# Patient Record
Sex: MALE | Race: BLACK OR AFRICAN AMERICAN | Employment: OTHER | URBAN - METROPOLITAN AREA
[De-identification: names, ages, dates, MRNs, and addresses within clinical notes are randomized per-mention and may not be internally consistent; named-entity substitution may affect disease eponyms.]

---

## 2017-03-12 ENCOUNTER — HOSPITAL ENCOUNTER (EMERGENCY)
Age: 72
Discharge: HOME | End: 2017-03-12
Attending: EMERGENCY MEDICINE
Payer: MEDICARE

## 2017-03-12 VITALS
HEART RATE: 88 BPM | BODY MASS INDEX: 27.12 KG/M2 | RESPIRATION RATE: 16 BRPM | DIASTOLIC BLOOD PRESSURE: 112 MMHG | WEIGHT: 183.64 LBS | TEMPERATURE: 97.6 F | OXYGEN SATURATION: 99 % | SYSTOLIC BLOOD PRESSURE: 184 MMHG

## 2017-03-12 DIAGNOSIS — H11.32 SUBCONJUNCTIVAL HEMORRHAGE OF LEFT EYE: Primary | ICD-10-CM

## 2017-03-12 DIAGNOSIS — S05.02XA CORNEAL ABRASION, LEFT, INITIAL ENCOUNTER: ICD-10-CM

## 2017-03-12 PROCEDURE — 99283 EMERGENCY DEPT VISIT LOW MDM: CPT

## 2017-03-12 RX ORDER — ERYTHROMYCIN 5 MG/G
OINTMENT OPHTHALMIC
Qty: 3.5 G | Refills: 0 | Status: SHIPPED | OUTPATIENT
Start: 2017-03-12 | End: 2017-03-19

## 2017-03-12 NOTE — DISCHARGE INSTRUCTIONS
Corneal Scratches: Care Instructions  Your Care Instructions    The cornea is the clear surface that covers the front of the eye. When a speck of dirt, a wood chip, an insect, or another object flies into your eye, it can cause a painful scratch on the cornea. Wearing contact lenses too long or rubbing your eyes can also scratch the cornea. Small scratches usually heal in a day or two. Deeper scratches may take longer. If you have had a foreign object removed from your eye or you have a corneal scratch, you will need to watch for infection and vision problems while your eye heals. Follow-up care is a key part of your treatment and safety. Be sure to make and go to all appointments, and call your doctor if you are having problems. Its also a good idea to know your test results and keep a list of the medicines you take. How can you care for yourself at home? · The doctor probably used a medicine during your exam to numb your eye. When it wears off in 30 to 60 minutes, your eye pain may come back. Take pain medicines exactly as directed. ¨ If the doctor gave you a prescription medicine for pain, take it as prescribed. ¨ If you are not taking a prescription pain medicine, ask your doctor if you can take an over-the-counter medicine. ¨ Do not take two or more pain medicines at the same time unless the doctor told you to. Many pain medicines have acetaminophen, which is Tylenol. Too much acetaminophen (Tylenol) can be harmful. · Do not rub your injured eye. Rubbing can make it worse. · Use the prescribed eyedrops or ointment as directed. Be sure the dropper or bottle tip is clean. To put in eyedrops or ointment:  ¨ Tilt your head back, and pull your lower eyelid down with one finger. ¨ Drop or squirt the medicine inside the lower lid. ¨ Close your eye for 30 to 60 seconds to let the drops or ointment move around. ¨ Do not touch the ointment or dropper tip to your eyelashes or any other surface.   · Do not use your contact lens in your hurt eye until your doctor says you can. Also, do not wear eye makeup until your eye has healed. · Do not drive if you have blurred vision. · Bright light may hurt. Sunglasses can help. · To prevent eye injuries in the future, wear safety glasses or goggles when you work with machines or tools, mow the lawn, or ride a bike or motorcycle. When should you call for help? Call your doctor now or seek immediate medical care if:  · You have any changes in your vision, flashes of light, or floaters (shadows or dark objects that float across your field of vision). · Pain or redness gets worse, or there is yellow, green, or bloody pus coming from the eye. · You have a fever. Watch closely for changes in your health, and be sure to contact your doctor if you have any problems. Where can you learn more? Go to http://krystle-tamia.info/. Enter A581 in the search box to learn more about \"Corneal Scratches: Care Instructions. \"  Current as of: May 23, 2016  Content Version: 11.1  © 4989-6132 [employer]. Care instructions adapted under license by [employer] (which disclaims liability or warranty for this information). If you have questions about a medical condition or this instruction, always ask your healthcare professional. [place] [address] any warranty or liability for your use of this information. Subconjunctival Hemorrhage: Care Instructions  Your Care Instructions    Sometimes small blood vessels in the white of the eye can break, causing a red spot or speck. This is called a subconjunctival hemorrhage. The blood vessels may break when you sneeze, cough, vomit, strain, or bend over. Sometimes there is no clear cause. The blood may look alarming, especially if the spot is large.  If there is no pain or vision change, there is usually no reason to worry, and the blood slowly will go away on its own in 2 to 3 weeks.  Follow-up care is a key part of your treatment and safety. Be sure to make and go to all appointments, and call your doctor if you are having problems. Its also a good idea to know your test results and keep a list of the medicines you take. How can you care for yourself at home? · Watch for changes in your eye. It is normal for the red spot on your eyeball to change color as it heals. Just like a bruise on your skin, it may change from red to brown to purple to yellow. · Do not take aspirin or products that contain aspirin, which can increase bleeding. Use acetaminophen (Tylenol) if you need pain relief for another problem. · Do not take two or more pain medicines at the same time unless the doctor told you to. Many pain medicines have acetaminophen, which is Tylenol. Too much acetaminophen (Tylenol) can be harmful. When should you call for help? Call your doctor now or seek immediate medical care if:  · You see blood over the black part of your eye (pupil). · You have any changes or problems in your vision. · You have any pain in your eye. · You have any discharge from your eye. Watch closely for changes in your health, and be sure to contact your doctor if:  · Your eye color is not steadily returning to normal.  · The blood has not gone away after 2 to 3 weeks. · You develop bruising or bleeding elsewhere, such as the gums or the skin, or you have nosebleeds. Where can you learn more? Go to http://krystle-tamia.info/. Enter D139 in the search box to learn more about \"Subconjunctival Hemorrhage: Care Instructions. \"  Current as of: May 23, 2016  Content Version: 11.1  © 9958-6888 [employer]. Care instructions adapted under license by [employer] (which disclaims liability or warranty for this information).  If you have questions about a medical condition or this instruction, always ask your healthcare professional. [place] [address] any warranty or liability for your use of this information.

## 2017-03-12 NOTE — ED PROVIDER NOTES
HPI Comments: Penelope Quach is a 80 y.o. male, pmhx significant for CVA (Dec 2016), and HTN, who presents ambulatory to the ED c/o intermittent left eye pain x 1 week and constant pain and redness since this morning. Pt states that he just woke up and noticed the pain and bright red coloring within his eye. Pt states that his last tetanus shot was 2 years ago. Pt denies any visual changes or any pain while in the ED. PCP: David Parisi MD    Social Hx: -tobacco (former), -EtOH, -Illicit Drugs    There are no other complaints, changes, or physical findings at this time. The history is provided by the patient. No  was used.         Past Medical History:   Diagnosis Date    Anxiety state, unspecified 8/27/2013    Arthritis     CAD (coronary artery disease)     Cataract cortical, senile 8/27/2013    Heart failure ([facility] [address]     MI 1993    Hypertension     Hypertensive retinopathy 8/27/2013    Observation for other specified suspected conditions 8/27/2013    Observation of [facility] Program Patient    Other ill-defined conditions(799.89)     chronic back pain    Other ill-defined conditions(799.89)     AGENT ORANGE EXPOSURE,     Psychiatric disorder     PTSD, NO MEDS    Stroke ([address]     TIA R HAND, RESOLVED    Unspecified adverse effect of anesthesia     SLOW TO WAKE, AGITATED WHEN HE WAKES    Unspecified reason for consultation 8/27/2013    Health maintenance       Past Surgical History:   Procedure Laterality Date    ABDOMEN SURGERY PROC UNLISTED      x2 hernia repairs    CARDIAC SURG PROCEDURE UNLIST      stent, cardiologist Dr. Evans Diop HX ORTHOPAEDIC Right     ROTATOR CUFF X2         Family History:   Problem Relation Age of Onset    Cancer Mother     Heart Attack Mother     Cancer Father     Anesth Problems Neg Hx        Social History     Social History    Marital status: SINGLE     Spouse name: N/A    Number of children: N/A    Years of education: N/A Occupational History    Not on file. Social History Main Topics    Smoking status: Former Smoker     Packs/day: 0.50     Years: 19.00    Smokeless tobacco: Former User     Quit date: 2/28/1984    Alcohol use No    Drug use: No    Sexual activity: Not on file     Other Topics Concern    Not on file     Social History Narrative         ALLERGIES: Darvocet a500 [propoxyphene n-acetaminophen]    Review of Systems   Constitutional: Negative. HENT: Negative. Eyes: Positive for pain and redness. Negative for visual disturbance. Respiratory: Negative. Cardiovascular: Negative. Gastrointestinal: Negative. Genitourinary: Negative. Musculoskeletal: Negative. Skin: Negative. Neurological: Negative. Psychiatric/Behavioral: Negative. All other systems reviewed and are negative. Vitals:    03/12/17 0948   BP: (!) 184/112   Pulse: 88   Resp: 16   Temp: 97.6 °F (36.4 °C)   SpO2: 99%   Weight: 83.3 kg (183 lb 10.3 oz)            Physical Exam   Constitutional: He is oriented to person, place, and time. He appears well-developed and well-nourished. No distress. HENT:   Head: Normocephalic and atraumatic. Right Ear: External ear normal.   Left Ear: External ear normal.   Nose: Nose normal.   Mouth/Throat: Oropharynx is clear and moist. No oropharyngeal exudate. Eyes: Conjunctivae and EOM are normal. Pupils are equal, round, and reactive to light. Right eye exhibits no discharge. Left eye exhibits no discharge. No scleral icterus. subcontragial hemorrhage in left eye   Neck: Normal range of motion. Neck supple. No JVD present. No tracheal deviation present. Cardiovascular: Normal rate, regular rhythm, normal heart sounds and intact distal pulses. Exam reveals no gallop and no friction rub. No murmur heard. Pulmonary/Chest: Effort normal and breath sounds normal. No respiratory distress. He has no wheezes. He has no rales. He exhibits no tenderness. Abdominal: Soft. Bowel sounds are normal. He exhibits no distension and no mass. There is no tenderness. There is no rebound and no guarding. Musculoskeletal: Normal range of motion. He exhibits no edema or tenderness. Lymphadenopathy:     He has no cervical adenopathy. Neurological: He is alert and oriented to person, place, and time. He has normal reflexes. No cranial nerve deficit. He exhibits normal muscle tone. Coordination normal.   Skin: Skin is warm and dry. He is not diaphoretic. Psychiatric: He has a normal mood and affect. His behavior is normal. Judgment and thought content normal.   Nursing note and vitals reviewed. MDM  Number of Diagnoses or Management Options  Corneal abrasion, left, initial encounter:   Subconjunctival hemorrhage of left eye:   Diagnosis management comments:   DDx: corneal abrasion, subcontragial hemorrhage, foreign body       Amount and/or Complexity of Data Reviewed  Review and summarize past medical records: yes    Patient Progress  Patient progress: stable    ED Course       Procedures    Procedure Note - Wood's lamp exam:  9:50 AM  Performed by: Francine Priest  Pts left eye was anesthetized with proparacaine, stained with fluorescein, and examined with a Wood's lamp, using lid eversion. Foreign body: no   Fluorescein uptake: yes, showing Corneal abrasion at 5 o'clock  The procedure took 1-15 minutes, and pt tolerated well. Written by Jenny Salmon, ED Scribe, as dictated by Francine Priest. IMPRESSION:  1. Subconjunctival hemorrhage of left eye    2. Corneal abrasion, left, initial encounter        PLAN:  1. Discharge home.   Discharge Medication List as of 3/12/2017  9:53 AM      START taking these medications    Details   erythromycin (ILOTYCIN) ophthalmic ointment 1 cm l eye 6 times daily, Normal, Disp-3.5 g, R-0         CONTINUE these medications which have NOT CHANGED    Details   clotrimazole-betamethasone (LOTRISONE) topical cream APPLY TO AREA TWICE A DAY, Print, Disp-135 g, R-3      fluticasone (FLONASE) 50 mcg/actuation nasal spray USE 2 SPRAYS IN BOTH NOSTRILS DAILY. , Print, Disp-3 Bottle, R-3      atorvastatin (LIPITOR) 80 mg tablet Take 1 Tab by mouth daily. , Normal, Disp-30 Tab, R-12      aspirin delayed-release 81 mg tablet Historical Med, R-2      COREG CR 40 mg CR capsule Historical Med, R-5      clopidogrel (PLAVIX) 75 mg tablet Historical Med, R-5      NITROSTAT 0.4 mg SL tablet Historical Med, R-3      potassium chloride (K-DUR, KLOR-CON) 20 mEq tablet Historical Med, R-5      RANEXA 500 mg SR tablet Historical Med, R-5      valsartan-hydrochlorothiazide (DIOVAN-HCT) 320-25 mg per tablet Historical Med, R-2      amLODIPine (NORVASC) 5 mg tablet Take 10 mg by mouth daily. , Historical Med      ezetimibe (ZETIA) 10 mg tablet Take  by mouth. Historical Med           2. Follow-up Information     Follow up With Details Comments Contact Info    Tristen Camara MD In 2 days For wound re-check [facility]  [phone]          3. Return to ED if worse       DISCHARGE NOTE:  9:53 AM  Patient's results have been reviewed with them. Patient and/or family have verbally conveyed their understanding and agreement of the patient's signs, symptoms, diagnosis, treatment and prognosis and additionally agree to follow up as recommended or return to the Emergency Room should their condition change prior to follow-up. Discharge instructions have also been provided to the patient with some educational information regarding their diagnosis as well a list of reasons why they would want to return to the ER prior to their follow-up appointment should their condition change. Written by Yaya Guerra, ED Scribe, as dictated by Katalina Fox. Attestation: This note is prepared by Yaya Guerra, acting as Scribe for Katalina Fox.     ELIZABETH Traore: The scribe's documentation has been prepared under my direction and personally reviewed by me in its entirety. I confirm that the note above accurately reflects all work, treatment, procedures, and medical decision making performed by me.

## 2017-03-20 ENCOUNTER — OFFICE VISIT (OUTPATIENT)
Dept: INTERNAL MEDICINE CLINIC | Age: 72
End: 2017-03-20

## 2017-03-20 VITALS
SYSTOLIC BLOOD PRESSURE: 130 MMHG | WEIGHT: 179 LBS | HEART RATE: 86 BPM | DIASTOLIC BLOOD PRESSURE: 80 MMHG | BODY MASS INDEX: 26.51 KG/M2 | HEIGHT: 69 IN | RESPIRATION RATE: 18 BRPM | TEMPERATURE: 98.2 F | OXYGEN SATURATION: 99 %

## 2017-03-20 DIAGNOSIS — I25.10 CORONARY ARTERY DISEASE INVOLVING NATIVE CORONARY ARTERY OF NATIVE HEART WITHOUT ANGINA PECTORIS: ICD-10-CM

## 2017-03-20 DIAGNOSIS — Z00.00 MEDICARE ANNUAL WELLNESS VISIT, SUBSEQUENT: ICD-10-CM

## 2017-03-20 DIAGNOSIS — E78.00 HYPERCHOLESTEROLEMIA: Primary | ICD-10-CM

## 2017-03-20 DIAGNOSIS — I10 HTN (HYPERTENSION), BENIGN: ICD-10-CM

## 2017-03-20 LAB
CHOLEST SERPL-MCNC: 194 MG/DL
HDLC SERPL-MCNC: 59 MG/DL
LDL CHOLESTEROL POC: 111
NON-HDL GOAL (POC): 135
TCHOL/HDL RATIO (POC): 3.3
TRIGL SERPL-MCNC: 122 MG/DL

## 2017-03-20 NOTE — PROGRESS NOTES
Joaquina Mosqueda is a 80 y.o. male and presents with Hospital Follow Up; Coronary Artery Disease; and Annual Wellness Visit  . Subjective:  He was seen recently for a stroke and admitted. He has done well thus far. He comes in today for follow up of an abrasion on the lt. Hypertension Review:  The patient has hypertension . Diet and Lifestyle: generally follows a low sodium diet, exercises sporadically  Home BP Monitoring: is not measured at home. Pertinent ROS: taking medications as instructed, no medication side effects noted, no TIA's, no chest pain on exertion, no dyspnea on exertion, no swelling of ankles. Dyslipidemia Review:  Patient presents for evaluation of lipids. Compliance with treatment thus far has been excellent. A repeat fasting lipid profile was done. The patient does not use medications that may worsen dyslipidemias . The patient exercises sporadically. The patient is not known to have coexisting coronary artery disease        Joaquina Mosqueda is a 80 y.o. male and presents for annual Medicare Wellness Visit. Problem List: Reviewed with patient and discussed risk factors.     Patient Active Problem List   Diagnosis Code    Hypercholesterolemia E78.00    CAD (coronary artery disease) I25.10    HTN (hypertension), benign I10    Anxiety state, unspecified F41.1    Cataract cortical, senile H25.019    Unspecified reason for consultation Z71.9    Hypertensive retinopathy H35.039    Observation for other specified suspected conditions Z03.89    Rotator cuff tear, left M75.102    Stroke (cerebrum) ([facility]) I63.9    Stenosis of both internal carotid arteries I65.23    Cerebral infarction involving right middle cerebral artery ([address] I63.311       Current medical providers:  Patient Care Team:  Lala Mckeon MD as PCP - General (Internal Medicine)  Marcela Romero RN as Nurse Navigator    PSH: Reviewed with patient  Past Surgical History:   Procedure Laterality Date    ABDOMEN SURGERY PROC UNLISTED      x2 hernia repairs    CARDIAC SURG PROCEDURE UNLIST      stent, cardiologist Dr. John Hood HX ORTHOPAEDIC Right     Claudia Andreia X2        SH: Reviewed with patient  Social History   Substance Use Topics    Smoking status: Former Smoker     Packs/day: 0.50     Years: 19.00    Smokeless tobacco: Former User     Quit date: 1984    Alcohol use No       FH: Reviewed with patient  Family History   Problem Relation Age of Onset    Cancer Mother     Heart Attack Mother     Cancer Father     Anesth Problems Neg Hx        Medications/Allergies: Reviewed with patient  Current Outpatient Prescriptions on File Prior to Visit   Medication Sig Dispense Refill    clotrimazole-betamethasone (LOTRISONE) topical cream APPLY TO AREA TWICE A  g 3    fluticasone (FLONASE) 50 mcg/actuation nasal spray USE 2 SPRAYS IN BOTH NOSTRILS DAILY. 3 Bottle 3    atorvastatin (LIPITOR) 80 mg tablet Take 1 Tab by mouth daily. 30 Tab 12    aspirin delayed-release 81 mg tablet   2    COREG CR 40 mg CR capsule   5    clopidogrel (PLAVIX) 75 mg tablet   5    NITROSTAT 0.4 mg SL tablet   3    potassium chloride (K-DUR, KLOR-CON) 20 mEq tablet   5    RANEXA 500 mg SR tablet   5    valsartan-hydrochlorothiazide (DIOVAN-HCT) 320-25 mg per tablet   2    amLODIPine (NORVASC) 5 mg tablet Take 10 mg by mouth daily.  [] erythromycin (ILOTYCIN) ophthalmic ointment 1 cm l eye 6 times daily 3.5 g 0    ezetimibe (ZETIA) 10 mg tablet Take  by mouth. No current facility-administered medications on file prior to visit. Allergies   Allergen Reactions    Darvocet A500 [Propoxyphene N-Acetaminophen] Other (comments)     floaty/high feeling       Objective:  Visit Vitals    /80    Pulse 86    Temp 98.2 °F (36.8 °C)    Resp 18    Ht 5' 9\" (1.753 m)    Wt 179 lb (81.2 kg)    SpO2 99%    BMI 26.43 kg/m2    Body mass index is 26.43 kg/(m^2).     Assessment of cognitive impairment: Alert and oriented x 3    Depression Screen:   PHQ 2 / 9, over the last two weeks 3/20/2017   Little interest or pleasure in doing things Not at all   Feeling down, depressed or hopeless Not at all   Total Score PHQ 2 0       Fall Risk Assessment:    Fall Risk Assessment, last 12 mths 3/20/2017   Able to walk? Yes   Fall in past 12 months? No       Functional Ability:   Does the patient exhibit a steady gait? yes   How long did it take the patient to get up and walk from a sitting position? seconds   Is the patient self reliant?  (ie can do own laundry, meals, household chores)  yes     Does the patient handle his/her own medications? yes     Does the patient handle his/her own money? yes     Is the patients home safe (ie good lighting, handrails on stairs and bath, etc.)? yes     Did you notice or did patient express any hearing difficulties? no     Did you notice or did patient express any vision difficulties?   no     Were distance and reading eye charts used? yes       Advance Care Planning:   Patient was offered the opportunity to discuss advance care planning:  yes     Does patient have an Advance Directive:  yes   If no, did you provide information on Caring Connections? yes       Plan:      Orders Placed This Encounter    AMB POC LIPID PROFILE       Health Maintenance   Topic Date Due    FOBT Q 1 YEAR AGE 50-75  11/24/2016    Pneumococcal 65+ Low/Medium Risk (2 of 2 - PPSV23) 02/07/2017    GLAUCOMA SCREENING Q2Y  08/14/2017    MEDICARE YEARLY EXAM  03/21/2018    DTaP/Tdap/Td series (2 - Td) 11/17/2024    Hepatitis C Screening  Addressed    ZOSTER VACCINE AGE 60>  Addressed    INFLUENZA AGE 9 TO ADULT  Addressed       *Patient verbalized understanding and agreement with the plan. A copy of the After Visit Summary with personalized health plan was given to the patient today.             Review of Systems  Constitutional: negative for fevers, chills, anorexia and weight loss  Eyes:   negative for visual disturbance and irritation  ENT:   negative for tinnitus,sore throat,nasal congestion,ear pains. hoarseness  Respiratory:  negative for cough, hemoptysis, dyspnea,wheezing  CV:   negative for chest pain, palpitations, lower extremity edema  GI:   negative for nausea, vomiting, diarrhea, abdominal pain,melena  Endo:               negative for polyuria,polydipsia,polyphagia,heat intolerance  Genitourinary: negative for frequency, dysuria and hematuria  Integument:  negative for rash and pruritus  Hematologic:  negative for easy bruising and gum/nose bleeding  Musculoskel: negative for myalgias, arthralgias, back pain, muscle weakness, joint pain  Neurological:  negative for headaches, dizziness, vertigo, memory problems and gait   Behavl/Psych: negative for feelings of anxiety, depression, mood changes    Past Medical History:   Diagnosis Date    Anxiety state, unspecified 8/27/2013    Arthritis     CAD (coronary artery disease)     Cataract cortical, senile 8/27/2013    Heart failure ([facility] [address]     MI 1993    Hypertension     Hypertensive retinopathy 8/27/2013    Observation for other specified suspected conditions 8/27/2013    Observation of CCHT Program Patient    Other ill-defined conditions(799.89)     chronic back pain    Other ill-defined conditions(799.89)     AGENT ORANGE EXPOSURE,     Psychiatric disorder     PTSD, NO MEDS    Stroke ([facility] [address]     TIA R HAND, RESOLVED    Unspecified adverse effect of anesthesia     SLOW TO WAKE, AGITATED WHEN HE WAKES    Unspecified reason for consultation 8/27/2013    Health maintenance     Past Surgical History:   Procedure Laterality Date    ABDOMEN SURGERY PROC UNLISTED      x2 hernia repairs    CARDIAC SURG PROCEDURE UNLIST      stent, cardiologist Dr. Neymar Tavares HX ORTHOPAEDIC Right     ROTATOR CUFF X2     Social History     Social History    Marital status: SINGLE     Spouse name: N/A    Number of children: N/A    Years of education: N/A     Social History Main Topics    Smoking status: Former Smoker     Packs/day: 0.50     Years: 19.00    Smokeless tobacco: Former User     Quit date: 2/28/1984    Alcohol use No    Drug use: No    Sexual activity: Not Asked     Other Topics Concern    None     Social History Narrative     Family History   Problem Relation Age of Onset    Cancer Mother     Heart Attack Mother     Cancer Father     Anesth Problems Neg Hx      Current Outpatient Prescriptions   Medication Sig Dispense Refill    clotrimazole-betamethasone (LOTRISONE) topical cream APPLY TO AREA TWICE A  g 3    fluticasone (FLONASE) 50 mcg/actuation nasal spray USE 2 SPRAYS IN BOTH NOSTRILS DAILY. 3 Bottle 3    atorvastatin (LIPITOR) 80 mg tablet Take 1 Tab by mouth daily. 30 Tab 12    aspirin delayed-release 81 mg tablet   2    COREG CR 40 mg CR capsule   5    clopidogrel (PLAVIX) 75 mg tablet   5    NITROSTAT 0.4 mg SL tablet   3    potassium chloride (K-DUR, KLOR-CON) 20 mEq tablet   5    RANEXA 500 mg SR tablet   5    valsartan-hydrochlorothiazide (DIOVAN-HCT) 320-25 mg per tablet   2    amLODIPine (NORVASC) 5 mg tablet Take 10 mg by mouth daily.  ezetimibe (ZETIA) 10 mg tablet Take  by mouth.        Allergies   Allergen Reactions    Darvocet A500 [Propoxyphene N-Acetaminophen] Other (comments)     floaty/high feeling       Objective:  Visit Vitals    /80    Pulse 86    Temp 98.2 °F (36.8 °C)    Resp 18    Ht 5' 9\" (1.753 m)    Wt 179 lb (81.2 kg)    SpO2 99%    BMI 26.43 kg/m2     Physical Exam:   General appearance - alert, well appearing, and in no distress  Mental status - alert, oriented to person, place, and time  EYE-JARED, EOMI, corneas normal, no foreign bodies  ENT-ENT exam normal, no neck nodes or sinus tenderness  Nose - normal and patent, no erythema, discharge or polyps  Mouth - mucous membranes moist, pharynx normal without lesions  Neck - supple, no significant adenopathy Chest - clear to auscultation, no wheezes, rales or rhonchi, symmetric air entry   Heart - normal rate, regular rhythm, normal S1, S2, no murmurs, rubs, clicks or gallops   Abdomen - soft, nontender, nondistended, no masses or organomegaly  Lymph- no adenopathy palpable  Ext-peripheral pulses normal, no pedal edema, no clubbing or cyanosis  Skin-Warm and dry. no hyperpigmentation, vitiligo, or suspicious lesions  Neuro -alert, oriented, normal speech, no focal findings or movement disorder noted  Neck-normal C-spine, no tenderness, full ROM without pain      Results for orders placed or performed in visit on 03/20/17   AMB POC LIPID PROFILE   Result Value Ref Range    Cholesterol (POC) 194     Triglycerides (POC) 122     HDL Cholesterol (POC) 59     LDL Cholesterol (POC) 111     Non-HDL Goal (POC) 135     TChol/HDL Ratio (POC) 3.3        Assessment/Plan:    ICD-10-CM ICD-9-CM    1. Hypercholesterolemia E78.00 272.0 AMB POC LIPID PROFILE   2. Coronary artery disease involving native coronary artery of native heart without angina pectoris I25.10 414.01 AMB POC LIPID PROFILE   3. HTN (hypertension), benign I10 401.1    4. Medicare annual wellness visit, subsequent Z00.00 V70.0      Orders Placed This Encounter    AMB POC LIPID PROFILE     lose weight, increase physical activity, follow low fat diet, follow low salt diet, continue present plan,Take 81mg aspirin daily  Patient Instructions   MyChart Activation    Thank you for requesting access to [employer]. Please follow the instructions below to securely access and download your online medical record. [employer] allows you to send messages to your doctor, view your test results, renew your prescriptions, schedule appointments, and more. How Do I Sign Up? 1. In your internet browser, go to [email or web address]  2. Click on the First Time User? Click Here link in the Sign In box. You will be redirect to the New Member Sign Up page.   3. Enter your [employer] Access Code exactly as it appears below. You will not need to use this code after youve completed the sign-up process. If you do not sign up before the expiration date, you must request a new code. [employer] Access Code: Activation code not generated  Current [employer] Status: Patient Declined (This is the date your [employer] access code will )    4. Enter the last four digits of your Social Security Number (xxxx) and Date of Birth (mm/dd/yyyy) as indicated and click Submit. You will be taken to the next sign-up page. 5. Create a [employer] ID. This will be your [employer] login ID and cannot be changed, so think of one that is secure and easy to remember. 6. Create a [employer] password. You can change your password at any time. 7. Enter your Password Reset Question and Answer. This can be used at a later time if you forget your password. 8. Enter your e-mail address. You will receive e-mail notification when new information is available in [address] 9. Click Sign Up. You can now view and download portions of your medical record. 10. Click the Download Summary menu link to download a portable copy of your medical information. Additional Information    If you have questions, please visit the Frequently Asked Questions section of the [employer] website at [email or web address] Remember, [employer] is NOT to be used for urgent needs. For medical emergencies, dial 911. Follow-up Disposition:  Return in about 3 months (around 2017), or if symptoms worsen or fail to improve. I have reviewed with the patient details of the assessment and plan and all questions were answered. Relevent patient education was performed    An After Visit Summary was printed and given to the patient.

## 2017-03-20 NOTE — MR AVS SNAPSHOT
Visit Information Date & Time Provider Department Dept. Phone Encounter #  
 3/20/2017  9:15 AM Caitlin Ames MD [address] [facility] [phone] [id number] Follow-up Instructions Return in about 3 months (around 6/20/2017), or if symptoms worsen or fail to improve. Follow-up and Disposition History Upcoming Health Maintenance Date Due FOBT Q 1 YEAR AGE 50-75 11/24/2016 Pneumococcal 65+ Low/Medium Risk (2 of 2 - PPSV23) 2/7/2017 GLAUCOMA SCREENING Q2Y 8/14/2017 MEDICARE YEARLY EXAM 3/21/2018 DTaP/Tdap/Td series (2 - Td) 11/17/2024 Allergies as of 3/20/2017  Review Complete On: 3/20/2017 By: Caitlin Ames MD  
  
 Severity Noted Reaction Type Reactions Darvocet A500 [Propoxyphene N-acetaminophen]  11/28/2011    Other (comments)  
 floaty/high feeling Current Immunizations  Reviewed on 11/17/2014 Name Date Influenza Vaccine 11/17/2014, 2/7/2012 Pneumococcal Vaccine (Unspecified Type) 2/7/2012 Td 5/10/2011 Tdap 11/17/2014 Not reviewed this visit You Were Diagnosed With   
  
 Codes Comments Hypercholesterolemia    -  Primary ICD-10-CM: E78.00 ICD-9-CM: 272.0 Coronary artery disease involving native coronary artery of native heart without angina pectoris     ICD-10-CM: I25.10 ICD-9-CM: 414.01   
 HTN (hypertension), benign     ICD-10-CM: I10 
ICD-9-CM: 401.1 Medicare annual wellness visit, subsequent     ICD-10-CM: Z00.00 ICD-9-CM: V70.0 Vitals BP Pulse Temp Resp Height(growth percentile) Weight(growth percentile) 130/80 86 98.2 °F (36.8 °C) 18 5' 9\" (1.753 m) 179 lb (81.2 kg) SpO2 BMI Smoking Status 99% 26.43 kg/m2 Former Smoker Vitals History BMI and BSA Data Body Mass Index Body Surface Area  
 26.43 kg/m 2 1.99 m 2 Preferred Pharmacy Pharmacy Name Phone  [facility] [address] [place], [place] [phone] Your Updated Medication List  
  
   
This list is accurate as of: 3/20/17 10:36 AM.  Always use your most recent med list. amLODIPine 5 mg tablet Commonly known as:  Chito Alstrom Take 10 mg by mouth daily. aspirin delayed-release 81 mg tablet  
  
 atorvastatin 80 mg tablet Commonly known as:  LIPITOR Take 1 Tab by mouth daily. clopidogrel 75 mg Tab Commonly known as:  PLAVIX  
  
 clotrimazole-betamethasone topical cream  
Commonly known as:  LOTRISONE  
APPLY TO AREA TWICE A DAY  
  
 COREG CR 40 mg CR capsule Generic drug:  carvedilol  
  
 fluticasone 50 mcg/actuation nasal spray Commonly known as:  FLONASE  
USE 2 SPRAYS IN BOTH NOSTRILS DAILY. NITROSTAT 0.4 mg SL tablet Generic drug:  nitroglycerin  
  
 potassium chloride 20 mEq tablet Commonly known as:  K-DUR, KLOR-CON  
  
 RANEXA 500 mg SR tablet Generic drug:  ranolazine ER  
  
 valsartan-hydroCHLOROthiazide 320-25 mg per tablet Commonly known as:  DIOVAN-HCT  
  
 ZETIA 10 mg tablet Generic drug:  ezetimibe Take  by mouth. We Performed the Following AMB POC LIPID PROFILE [93307 CPT(R)] Follow-up Instructions Return in about 3 months (around 6/20/2017), or if symptoms worsen or fail to improve. Patient Instructions [employer] Activation Thank you for requesting access to [employer]. Please follow the instructions below to securely access and download your online medical record. [employer] allows you to send messages to your doctor, view your test results, renew your prescriptions, schedule appointments, and more. How Do I Sign Up? 1. In your internet browser, go to [email or web address] 
2. Click on the First Time User? Click Here link in the Sign In box. You will be redirect to the New Member Sign Up page. 3. Enter your [employer] Access Code exactly as it appears below.  You will not need to use this code after youve completed the sign-up process. If you do not sign up before the expiration date, you must request a new code. [email or web address] Access Code: Activation code not generated Current [employer] Status: Patient Declined (This is the date your [employer] access code will ) 4. Enter the last four digits of your Social Security Number (xxxx) and Date of Birth (mm/dd/yyyy) as indicated and click Submit. You will be taken to the next sign-up page. 5. Create a [employer]ht ID. This will be your [employer] login ID and cannot be changed, so think of one that is secure and easy to remember. 6. Create a [employer] password. You can change your password at any time. 7. Enter your Password Reset Question and Answer. This can be used at a later time if you forget your password. 8. Enter your e-mail address. You will receive e-mail notification when new information is available in [address] 9. Click Sign Up. You can now view and download portions of your medical record. 10. Click the Download Summary menu link to download a portable copy of your medical information. Additional Information If you have questions, please visit the Frequently Asked Questions section of the [employer] website at [email or web address] Remember, [employer] is NOT to be used for urgent needs. For medical emergencies, dial 911. Please provide this summary of care documentation to your next provider. Your primary care clinician is listed as Angel Carnes. If you have any questions after today's visit, please call [phone].

## 2017-03-20 NOTE — PATIENT INSTRUCTIONS
[email or web address] Activation    Thank you for requesting access to [employer]. Please follow the instructions below to securely access and download your online medical record. [employer] allows you to send messages to your doctor, view your test results, renew your prescriptions, schedule appointments, and more. How Do I Sign Up? 1. In your internet browser, go to [email or web address]  2. Click on the First Time User? Click Here link in the Sign In box. You will be redirect to the New Member Sign Up page. 3. Enter your [employer] Access Code exactly as it appears below. You will not need to use this code after youve completed the sign-up process. If you do not sign up before the expiration date, you must request a new code. [employer] Access Code: Activation code not generated  Current [employer] Status: Patient Declined (This is the date your [employer] access code will )    4. Enter the last four digits of your Social Security Number (xxxx) and Date of Birth (mm/dd/yyyy) as indicated and click Submit. You will be taken to the next sign-up page. 5. Create a [employer] ID. This will be your [employer] login ID and cannot be changed, so think of one that is secure and easy to remember. 6. Create a [employer] password. You can change your password at any time. 7. Enter your Password Reset Question and Answer. This can be used at a later time if you forget your password. 8. Enter your e-mail address. You will receive e-mail notification when new information is available in [address] 9. Click Sign Up. You can now view and download portions of your medical record. 10. Click the Download Summary menu link to download a portable copy of your medical information. Additional Information    If you have questions, please visit the Frequently Asked Questions section of the [employer] website at [email or web address] Remember, [employer] is NOT to be used for urgent needs. For medical emergencies, dial 911.

## 2017-06-04 RX ORDER — ATORVASTATIN CALCIUM 80 MG/1
80 TABLET, FILM COATED ORAL DAILY
Qty: 30 TAB | Refills: 12 | Status: SHIPPED | COMMUNITY
Start: 2017-06-04 | End: 2019-01-01 | Stop reason: DRUGHIGH

## 2017-06-19 ENCOUNTER — OFFICE VISIT (OUTPATIENT)
Dept: INTERNAL MEDICINE CLINIC | Age: 72
End: 2017-06-19

## 2017-06-19 VITALS
DIASTOLIC BLOOD PRESSURE: 80 MMHG | HEIGHT: 69 IN | HEART RATE: 72 BPM | TEMPERATURE: 98.3 F | OXYGEN SATURATION: 98 % | WEIGHT: 178 LBS | RESPIRATION RATE: 16 BRPM | BODY MASS INDEX: 26.36 KG/M2 | SYSTOLIC BLOOD PRESSURE: 130 MMHG

## 2017-06-19 DIAGNOSIS — Z12.11 SCREEN FOR COLON CANCER: ICD-10-CM

## 2017-06-19 DIAGNOSIS — E78.00 HYPERCHOLESTEROLEMIA: Primary | ICD-10-CM

## 2017-06-19 DIAGNOSIS — I63.511 CEREBRAL INFARCTION INVOLVING RIGHT MIDDLE CEREBRAL ARTERY (HCC): ICD-10-CM

## 2017-06-19 DIAGNOSIS — I10 HTN (HYPERTENSION), BENIGN: ICD-10-CM

## 2017-06-19 DIAGNOSIS — M47.812 SPONDYLOSIS OF CERVICAL REGION WITHOUT MYELOPATHY OR RADICULOPATHY: ICD-10-CM

## 2017-06-19 LAB
CHOLEST SERPL-MCNC: 153 MG/DL
HDLC SERPL-MCNC: 58 MG/DL
LDL CHOLESTEROL POC: 82 MG/DL
NON-HDL GOAL (POC): 95
TCHOL/HDL RATIO (POC): 2.6
TRIGL SERPL-MCNC: 66 MG/DL

## 2017-06-19 RX ORDER — FLUTICASONE PROPIONATE 50 MCG
SPRAY, SUSPENSION (ML) NASAL
Qty: 3 BOTTLE | Refills: 3 | Status: SHIPPED | OUTPATIENT
Start: 2017-06-19 | End: 2017-10-30 | Stop reason: SDUPTHER

## 2017-06-19 NOTE — PROGRESS NOTES
Abdirahman Barjaas is a 80 y.o. male and presents with Cholesterol Problem and Hypertension  . Subjective:    Hypertension Review:  The patient has hypertension . Diet and Lifestyle: generally follows a low sodium diet, exercises sporadically  Home BP Monitoring: is not measured at home. Pertinent ROS: taking medications as instructed, no medication side effects noted, no TIA's, no chest pain on exertion, no dyspnea on exertion, no swelling of ankles. Dyslipidemia Review:  Patient presents for evaluation of lipids. Compliance with treatment thus far has been excellent. A repeat fasting lipid profile was done. The patient does not use medications that may worsen dyslipidemias . The patient exercises sporadically. The patient is not known to have coexisting coronary artery disease    He has a history of having a cerebral infarction and has been doing well    Back Pain Review:  Patient presents for evaluation of upper and shoulder back problems. Symptoms have been present for several days and include pain in lower back (dull, mild in character; 4/10 in severity). Initial inciting event: unknown. Symptoms are worst: at times. Alleviating factors identifiable by patient are lying flat, medication . Exacerbating factors identifiable by patient are bending forwards, bending backwards. Treatments so far initiated by patient: medication Previous lower back problems: reported. Previous workup: none. Review of Systems  Constitutional: negative for fevers, chills, anorexia and weight loss  Eyes:   negative for visual disturbance and irritation  ENT:   negative for tinnitus,sore throat,nasal congestion,ear pains. hoarseness  Respiratory:  negative for cough, hemoptysis, dyspnea,wheezing  CV:   negative for chest pain, palpitations, lower extremity edema  GI:   negative for nausea, vomiting, diarrhea, abdominal pain,melena  Endo:               negative for polyuria,polydipsia,polyphagia,heat intolerance  Genitourinary: negative for frequency, dysuria and hematuria  Integument:  negative for rash and pruritus  Hematologic:  negative for easy bruising and gum/nose bleeding  Musculoskel: back pain, joint pain  Neurological:  negative for headaches, dizziness, vertigo, memory problems and gait   Behavl/Psych: negative for feelings of anxiety, depression, mood changes    Past Medical History:   Diagnosis Date    Anxiety state, unspecified 8/27/2013    Arthritis     CAD (coronary artery disease)     Cataract cortical, senile 8/27/2013    Heart failure ([facility] [address]     MI 1993    Hypertension     Hypertensive retinopathy 8/27/2013    Observation for other specified suspected conditions 8/27/2013    Observation of CCHT Program Patient    Other ill-defined conditions     chronic back pain    Other ill-defined conditions     AGENT ORANGE EXPOSURE,     Psychiatric disorder     PTSD, NO MEDS    Stroke ([facility] [address]     TIA R HAND, RESOLVED    Unspecified adverse effect of anesthesia     SLOW TO WAKE, AGITATED WHEN HE WAKES    Unspecified reason for consultation 8/27/2013    Health maintenance     Past Surgical History:   Procedure Laterality Date    ABDOMEN SURGERY PROC UNLISTED      x2 hernia repairs    CARDIAC SURG PROCEDURE UNLIST      stent, cardiologist Dr. Sary Elaine HX ORTHOPAEDIC Right     ROTATOR CUFF X2     Social History     Social History    Marital status: SINGLE     Spouse name: N/A    Number of children: N/A    Years of education: N/A     Social History Main Topics    Smoking status: Former Smoker     Packs/day: 0.50     Years: 19.00    Smokeless tobacco: Former User     Quit date: 2/28/1984    Alcohol use No    Drug use: No    Sexual activity: Not Asked     Other Topics Concern    None     Social History Narrative     Family History   Problem Relation Age of Onset    Cancer Mother     Heart Attack Mother     Cancer Father     Anesth Problems Neg Hx      Current Outpatient Prescriptions Medication Sig Dispense Refill    fluticasone (FLONASE) 50 mcg/actuation nasal spray USE 2 SPRAYS IN BOTH NOSTRILS DAILY. 3 Bottle 3    atorvastatin (LIPITOR) 80 mg tablet Take 1 Tab by mouth daily. 30 Tab 12    clotrimazole-betamethasone (LOTRISONE) topical cream APPLY TO AREA TWICE A  g 3    aspirin delayed-release 81 mg tablet   2    COREG CR 40 mg CR capsule   5    clopidogrel (PLAVIX) 75 mg tablet   5    NITROSTAT 0.4 mg SL tablet   3    potassium chloride (K-DUR, KLOR-CON) 20 mEq tablet   5    RANEXA 500 mg SR tablet   5    amLODIPine (NORVASC) 5 mg tablet Take 10 mg by mouth daily.  ezetimibe (ZETIA) 10 mg tablet Take  by mouth.  valsartan-hydrochlorothiazide (DIOVAN-HCT) 320-25 mg per tablet   2     Allergies   Allergen Reactions    Darvocet A500 [Propoxyphene N-Acetaminophen] Other (comments)     floaty/high feeling       Objective:  Visit Vitals    /80    Pulse 72    Temp 98.3 °F (36.8 °C) (Oral)    Resp 16    Ht 5' 9\" (1.753 m)    Wt 178 lb (80.7 kg)    SpO2 98%    BMI 26.29 kg/m2     Physical Exam:   General appearance - alert, well appearing, and in no distress  Mental status - alert, oriented to person, place, and time  EYE-JARED, EOMI, corneas normal, no foreign bodies  ENT-ENT exam normal, no neck nodes or sinus tenderness  Nose - normal and patent, no erythema, discharge or polyps  Mouth - mucous membranes moist, pharynx normal without lesions  Chest - clear to auscultation, no wheezes, rales or rhonchi, symmetric air entry   Heart - normal rate, regular rhythm, normal S1, S2, no murmurs, rubs, clicks or gallops   Abdomen - soft, nontender, nondistended, no masses or organomegaly  Lymph- no adenopathy palpable  Ext-peripheral pulses normal, no pedal edema, no clubbing or cyanosis  Skin-Warm and dry.  no hyperpigmentation, vitiligo, or suspicious lesions  Neuro -alert, oriented, normal speech, no focal findings or movement disorder noted  Neck-tenderness over lower cervical spine and nuchal area, tenderness over trapezial muscles, reduced painful C-spine range of motion        Results for orders placed or performed in visit on 17   AMB POC LIPID PROFILE   Result Value Ref Range    Cholesterol (POC) 153     Triglycerides (POC) 66     HDL Cholesterol (POC) 58     LDL Cholesterol (POC) 82 MG/DL    Non-HDL Goal (POC) 95     TChol/HDL Ratio (POC) 2.6        Assessment/Plan:    ICD-10-CM ICD-9-CM    1. Hypercholesterolemia E78.00 272.0 AMB POC LIPID PROFILE   2. Screen for colon cancer Z12.11 V76.51 fluticasone (FLONASE) 50 mcg/actuation nasal spray      OCCULT BLOOD, IMMUNOASSAY (FIT)     Orders Placed This Encounter    OCCULT BLOOD, IMMUNOASSAY (FIT)    AMB POC LIPID PROFILE    fluticasone (FLONASE) 50 mcg/actuation nasal spray     Sig: USE 2 SPRAYS IN BOTH NOSTRILS DAILY. Dispense:  3 Bottle     Refill:  3     lose weight, increase physical activity, follow low fat diet, follow low salt diet, continue present plan,Take 81mg aspirin daily  Patient Instructions   [employer] Activation    Thank you for requesting access to [employer]. Please follow the instructions below to securely access and download your online medical record. [employer] allows you to send messages to your doctor, view your test results, renew your prescriptions, schedule appointments, and more. How Do I Sign Up? 1. In your internet browser, go to [email or web address]  2. Click on the First Time User? Click Here link in the Sign In box. You will be redirect to the New Member Sign Up page. 3. Enter your [employer] Access Code exactly as it appears below. You will not need to use this code after youve completed the sign-up process. If you do not sign up before the expiration date, you must request a new code. [employer] Access Code: Activation code not generated  Current [employer] Status: Patient Declined (This is the date your [employer] access code will )    4.  Enter the last four digits of your Social Security Number (xxxx) and Date of Birth (mm/dd/yyyy) as indicated and click Submit. You will be taken to the next sign-up page. 5. Create a [employer] ID. This will be your [employer] login ID and cannot be changed, so think of one that is secure and easy to remember. 6. Create a [employer] password. You can change your password at any time. 7. Enter your Password Reset Question and Answer. This can be used at a later time if you forget your password. 8. Enter your e-mail address. You will receive e-mail notification when new information is available in [address] 9. Click Sign Up. You can now view and download portions of your medical record. 10. Click the Download Summary menu link to download a portable copy of your medical information. Additional Information    If you have questions, please visit the Frequently Asked Questions section of the [employer] website at [email or web address] Remember, [employer] is NOT to be used for urgent needs. For medical emergencies, dial 911. Follow-up Disposition:  Return in about 3 months (around 9/19/2017), or if symptoms worsen or fail to improve. I have reviewed with the patient details of the assessment and plan and all questions were answered. Relevent patient education was performed    An After Visit Summary was printed and given to the patient.

## 2017-06-19 NOTE — MR AVS SNAPSHOT
Visit Information Date & Time Provider Department Dept. Phone Encounter #  
 6/19/2017  9:30 AM Kavin Coelho [facility]  Marianne Navarrete [phone] [id number] Follow-up Instructions Return in about 3 months (around 9/19/2017), or if symptoms worsen or fail to improve. Upcoming Health Maintenance Date Due FOBT Q 1 YEAR AGE 50-75 11/24/2016 Pneumococcal 65+ Low/Medium Risk (2 of 2 - PPSV23) 2/7/2017 GLAUCOMA SCREENING Q2Y 8/14/2017 INFLUENZA AGE 9 TO ADULT 8/1/2017 MEDICARE YEARLY EXAM 3/21/2018 DTaP/Tdap/Td series (2 - Td) 11/17/2024 Allergies as of 6/19/2017  Review Complete On: 6/19/2017 By: Kavin Coelho MD  
  
 Severity Noted Reaction Type Reactions Darvocet A500 [Propoxyphene N-acetaminophen]  11/28/2011    Other (comments)  
 floaty/high feeling Current Immunizations  Reviewed on 11/17/2014 Name Date Influenza Vaccine 11/17/2014, 2/7/2012 Pneumococcal Vaccine (Unspecified Type) 2/7/2012 Td 5/10/2011 Tdap 11/17/2014 Not reviewed this visit You Were Diagnosed With   
  
 Codes Comments Hypercholesterolemia    -  Primary ICD-10-CM: E78.00 ICD-9-CM: 272.0 Screen for colon cancer     ICD-10-CM: Z12.11 ICD-9-CM: V76.51 Spondylosis of cervical region without myelopathy or radiculopathy     ICD-10-CM: M47.812 ICD-9-CM: 721.0   
 HTN (hypertension), benign     ICD-10-CM: I10 
ICD-9-CM: 401.1 Cerebral infarction involving right middle cerebral artery ([facility] [address]     ICD-10-CM: V36.083 ICD-9-CM: 434.01 Vitals BP Pulse Temp Resp Height(growth percentile) Weight(growth percentile) 130/80 72 98.3 °F (36.8 °C) (Oral) 16 5' 9\" (1.753 m) 178 lb (80.7 kg) SpO2 BMI Smoking Status 98% 26.29 kg/m2 Former Smoker Vitals History BMI and BSA Data Body Mass Index Body Surface Area  
 26.29 kg/m 2 1.98 m 2 Preferred Pharmacy Pharmacy Name Phone [facility] [phone] Your Updated Medication List  
  
   
This list is accurate as of: 6/19/17  9:57 AM.  Always use your most recent med list. amLODIPine 5 mg tablet Commonly known as:  Claudell Hesselbach Take 10 mg by mouth daily. aspirin delayed-release 81 mg tablet  
  
 atorvastatin 80 mg tablet Commonly known as:  LIPITOR Take 1 Tab by mouth daily. clopidogrel 75 mg Tab Commonly known as:  PLAVIX  
  
 clotrimazole-betamethasone topical cream  
Commonly known as:  LOTRISONE  
APPLY TO AREA TWICE A DAY  
  
 COREG CR 40 mg CR capsule Generic drug:  carvedilol  
  
 fluticasone 50 mcg/actuation nasal spray Commonly known as:  FLONASE  
USE 2 SPRAYS IN BOTH NOSTRILS DAILY. NITROSTAT 0.4 mg SL tablet Generic drug:  nitroglycerin  
  
 potassium chloride 20 mEq tablet Commonly known as:  K-DUR, KLOR-CON  
  
 RANEXA 500 mg SR tablet Generic drug:  ranolazine ER  
  
 valsartan-hydroCHLOROthiazide 320-25 mg per tablet Commonly known as:  DIOVAN-HCT  
  
 ZETIA 10 mg tablet Generic drug:  ezetimibe Take  by mouth. Prescriptions Printed Refills  
 fluticasone (FLONASE) 50 mcg/actuation nasal spray 3 Sig: USE 2 SPRAYS IN BOTH NOSTRILS DAILY. Class: Print We Performed the Following AMB POC LIPID PROFILE [63550 CPT(R)] OCCULT BLOOD, IMMUNOASSAY (FIT) [id number] CPT(R)] Follow-up Instructions Return in about 3 months (around 9/19/2017), or if symptoms worsen or fail to improve. Patient Instructions [employer] Activation Thank you for requesting access to [employer]. Please follow the instructions below to securely access and download your online medical record. [employer] allows you to send messages to your doctor, view your test results, renew your prescriptions, schedule appointments, and more. How Do I Sign Up? 1. In your internet browser, go to [email or web address] 
 2. Click on the First Time User? Click Here link in the Sign In box. You will be redirect to the New Member Sign Up page. 3. Enter your [employer] Access Code exactly as it appears below. You will not need to use this code after youve completed the sign-up process. If you do not sign up before the expiration date, you must request a new code. MyChart Access Code: Activation code not generated Current [employer] Status: Patient Declined (This is the date your MyChart access code will ) 4. Enter the last four digits of your Social Security Number (xxxx) and Date of Birth (mm/dd/yyyy) as indicated and click Submit. You will be taken to the next sign-up page. 5. Create a [employer] ID. This will be your [employer] login ID and cannot be changed, so think of one that is secure and easy to remember. 6. Create a [employer] password. You can change your password at any time. 7. Enter your Password Reset Question and Answer. This can be used at a later time if you forget your password. 8. Enter your e-mail address. You will receive e-mail notification when new information is available in [address] 9. Click Sign Up. You can now view and download portions of your medical record. 10. Click the Download Summary menu link to download a portable copy of your medical information. Additional Information If you have questions, please visit the Frequently Asked Questions section of the [employer] website at [email or web address] Remember, [employer] is NOT to be used for urgent needs. For medical emergencies, dial 911. Please provide this summary of care documentation to your next provider. Your primary care clinician is listed as Meño Rowland. If you have any questions after today's visit, please call [phone].

## 2017-06-19 NOTE — PATIENT INSTRUCTIONS
[employer] Activation    Thank you for requesting access to [employer]. Please follow the instructions below to securely access and download your online medical record. [employer] allows you to send messages to your doctor, view your test results, renew your prescriptions, schedule appointments, and more. How Do I Sign Up? 1. In your internet browser, go to [email or web address]  2. Click on the First Time User? Click Here link in the Sign In box. You will be redirect to the New Member Sign Up page. 3. Enter your [employer] Access Code exactly as it appears below. You will not need to use this code after youve completed the sign-up process. If you do not sign up before the expiration date, you must request a new code. [employer] Access Code: Activation code not generated  Current [employer] Status: Patient Declined (This is the date your [employer] access code will )    4. Enter the last four digits of your Social Security Number (xxxx) and Date of Birth (mm/dd/yyyy) as indicated and click Submit. You will be taken to the next sign-up page. 5. Create a [employer] ID. This will be your [employer] login ID and cannot be changed, so think of one that is secure and easy to remember. 6. Create a [employer] password. You can change your password at any time. 7. Enter your Password Reset Question and Answer. This can be used at a later time if you forget your password. 8. Enter your e-mail address. You will receive e-mail notification when new information is available in [address] 9. Click Sign Up. You can now view and download portions of your medical record. 10. Click the Download Summary menu link to download a portable copy of your medical information. Additional Information    If you have questions, please visit the Frequently Asked Questions section of the [employer] website at [email or web address] Remember, [employer] is NOT to be used for urgent needs. For medical emergencies, dial 911.

## 2017-09-18 ENCOUNTER — OFFICE VISIT (OUTPATIENT)
Dept: INTERNAL MEDICINE CLINIC | Age: 72
End: 2017-09-18

## 2017-09-18 VITALS
BODY MASS INDEX: 26.66 KG/M2 | HEIGHT: 69 IN | SYSTOLIC BLOOD PRESSURE: 130 MMHG | WEIGHT: 180 LBS | HEART RATE: 64 BPM | RESPIRATION RATE: 18 BRPM | TEMPERATURE: 97.5 F | DIASTOLIC BLOOD PRESSURE: 80 MMHG | OXYGEN SATURATION: 98 %

## 2017-09-18 DIAGNOSIS — M47.812 SPONDYLOSIS OF CERVICAL REGION WITHOUT MYELOPATHY OR RADICULOPATHY: ICD-10-CM

## 2017-09-18 DIAGNOSIS — I25.10 CORONARY ARTERY DISEASE INVOLVING NATIVE CORONARY ARTERY OF NATIVE HEART WITHOUT ANGINA PECTORIS: ICD-10-CM

## 2017-09-18 DIAGNOSIS — Z12.11 SCREENING FOR COLON CANCER: ICD-10-CM

## 2017-09-18 DIAGNOSIS — E78.00 HYPERCHOLESTEREMIA: ICD-10-CM

## 2017-09-18 DIAGNOSIS — I10 HTN (HYPERTENSION), BENIGN: Primary | ICD-10-CM

## 2017-09-18 LAB
CHOLEST SERPL-MCNC: 201 MG/DL
HDLC SERPL-MCNC: 63 MG/DL
LDL CHOLESTEROL POC: 123 MG/DL
NON-HDL GOAL (POC): 138
TCHOL/HDL RATIO (POC): 3.2
TRIGL SERPL-MCNC: 76 MG/DL

## 2017-09-18 NOTE — MR AVS SNAPSHOT
Visit Information Date & Time Provider Department Dept. Phone Encounter #  
 9/18/2017  9:15 AM [place]bolledo [place] [address] [address] [phone] [id number] Follow-up Instructions Return in about 3 months (around 12/18/2017), or if symptoms worsen or fail to improve. Upcoming Health Maintenance Date Due FOBT Q 1 YEAR AGE 50-75 11/24/2016 Pneumococcal 65+ Low/Medium Risk (2 of 2 - PPSV23) 2/7/2017 GLAUCOMA SCREENING Q2Y 8/14/2017 MEDICARE YEARLY EXAM 3/21/2018 DTaP/Tdap/Td series (2 - Td) 11/17/2024 Allergies as of 9/18/2017  Review Complete On: 9/18/2017 By: Juani Rebolledo MD  
  
 Severity Noted Reaction Type Reactions Darvocet A500 [Propoxyphene N-acetaminophen]  11/28/2011    Other (comments)  
 floaty/high feeling Current Immunizations  Reviewed on 11/17/2014 Name Date Influenza Vaccine 11/17/2014, 2/7/2012 Pneumococcal Vaccine (Unspecified Type) 2/7/2012 Td 5/10/2011 Tdap 11/17/2014 Not reviewed this visit You Were Diagnosed With   
  
 Codes Comments HTN (hypertension), benign    -  Primary ICD-10-CM: I10 
ICD-9-CM: 401.1 Screening for colon cancer     ICD-10-CM: Z12.11 ICD-9-CM: V76.51 Coronary artery disease involving native coronary artery of native heart without angina pectoris     ICD-10-CM: I25.10 ICD-9-CM: 414.01 Hypercholesteremia     ICD-10-CM: E78.00 ICD-9-CM: 272.0 Spondylosis of cervical region without myelopathy or radiculopathy     ICD-10-CM: M47.812 ICD-9-CM: 721.0 Vitals BP Pulse Temp Resp Height(growth percentile) Weight(growth percentile) 130/80 (BP 1 Location: Right arm, BP Patient Position: Sitting) 64 97.5 °F (36.4 °C) (Oral) 18 5' 9\" (1.753 m) 180 lb (81.6 kg) SpO2 BMI Smoking Status 98% 26.58 kg/m2 Former Smoker Vitals History BMI and BSA Data  Body Mass Index Body Surface Area  
 26.58 kg/m 2 1.99 m 2  
  
 Preferred Pharmacy Pharmacy Name Phone [address] [phone] Your Updated Medication List  
  
   
This list is accurate as of: 9/18/17 10:19 AM.  Always use your most recent med list. amLODIPine 5 mg tablet Commonly known as:  Francesca Matar Take 10 mg by mouth daily. aspirin delayed-release 81 mg tablet  
  
 atorvastatin 80 mg tablet Commonly known as:  LIPITOR Take 1 Tab by mouth daily. clopidogrel 75 mg Tab Commonly known as:  PLAVIX  
  
 clotrimazole-betamethasone topical cream  
Commonly known as:  LOTRISONE  
APPLY TO AREA TWICE A DAY  
  
 COREG CR 40 mg CR capsule Generic drug:  carvedilol  
  
 fluticasone 50 mcg/actuation nasal spray Commonly known as:  FLONASE  
USE 2 SPRAYS IN BOTH NOSTRILS DAILY. NITROSTAT 0.4 mg SL tablet Generic drug:  nitroglycerin  
  
 potassium chloride 20 mEq tablet Commonly known as:  K-DUR, KLOR-CON  
  
 RANEXA 500 mg SR tablet Generic drug:  ranolazine ER  
  
 valsartan-hydroCHLOROthiazide 320-25 mg per tablet Commonly known as:  DIOVAN-HCT  
  
 ZETIA 10 mg tablet Generic drug:  ezetimibe Take  by mouth. We Performed the Following AMB POC LIPID PROFILE [31101 CPT(R)] CBC W/O DIFF [00331 CPT(R)] METABOLIC PANEL, COMPREHENSIVE [05370 CPT(R)] OCCULT BLOOD, IMMUNOASSAY (FIT) [id number] CPT(R)] Follow-up Instructions Return in about 3 months (around 12/18/2017), or if symptoms worsen or fail to improve. Patient Instructions [employer]ago Activation Thank you for requesting access to [employer]. Please follow the instructions below to securely access and download your online medical record. [employer] allows you to send messages to your doctor, view your test results, renew your prescriptions, schedule appointments, and more. How Do I Sign Up? 1. In your internet browser, go to [email or web address] 
 2. Click on the First Time User? Click Here link in the Sign In box. You will be redirect to the New Member Sign Up page. 3. Enter your [employer] Access Code exactly as it appears below. You will not need to use this code after youve completed the sign-up process. If you do not sign up before the expiration date, you must request a new code. MyChart Access Code: Activation code not generated Current [employer] Status: Patient Declined (This is the date your MyChart access code will ) 4. Enter the last four digits of your Social Security Number (xxxx) and Date of Birth (mm/dd/yyyy) as indicated and click Submit. You will be taken to the next sign-up page. 5. Create a [employer] ID. This will be your [employer] login ID and cannot be changed, so think of one that is secure and easy to remember. 6. Create a [employer] password. You can change your password at any time. 7. Enter your Password Reset Question and Answer. This can be used at a later time if you forget your password. 8. Enter your e-mail address. You will receive e-mail notification when new information is available in [address] 9. Click Sign Up. You can now view and download portions of your medical record. 10. Click the Download Summary menu link to download a portable copy of your medical information. Additional Information If you have questions, please visit the Frequently Asked Questions section of the [employer] website at [email or web address] Remember, [employer] is NOT to be used for urgent needs. For medical emergencies, dial 911. Please provide this summary of care documentation to your next provider. Your primary care clinician is listed as Sukh Duque. If you have any questions after today's visit, please call [phone].

## 2017-09-18 NOTE — PATIENT INSTRUCTIONS
PT [place] [employer] Activation    Thank you for requesting access to "[employer]". Please follow the instructions below to securely access and download your online medical record. "[employer]" allows you to send messages to your doctor, view your test results, renew your prescriptions, schedule appointments, and more. How Do I Sign Up? 1. In your internet browser, go to [email or web address]  2. Click on the First Time User? Click Here link in the Sign In box. You will be redirect to the New Member Sign Up page. 3. Enter your "[employer]" Access Code exactly as it appears below. You will not need to use this code after youve completed the sign-up process. If you do not sign up before the expiration date, you must request a new code. "[employer]" Access Code: Activation code not generated  Current "[employer]" Status: Patient Declined (This is the date your "[employer]" access code will )    4. Enter the last four digits of your Social Security Number (xxxx) and Date of Birth (mm/dd/yyyy) as indicated and click Submit. You will be taken to the next sign-up page. 5. Create a "[employer]" ID. This will be your "[employer]" login ID and cannot be changed, so think of one that is secure and easy to remember. 6. Create a "[employer]" password. You can change your password at any time. 7. Enter your Password Reset Question and Answer. This can be used at a later time if you forget your password. 8. Enter your e-mail address. You will receive e-mail notification when new information is available in [address] 9. Click Sign Up. You can now view and download portions of your medical record. 10. Click the Download Summary menu link to download a portable copy of your medical information. Additional Information    If you have questions, please visit the Frequently Asked Questions section of the "[employer]" website at [email or web address] Remember, "[employer]" is NOT to be used for urgent needs. For medical emergencies, dial 911.

## 2017-09-18 NOTE — PROGRESS NOTES
Caro Murillo is a 80 y.o. male and presents with Cholesterol Problem; Hypertension; and Neck Pain (comes and goes)  . Subjective:    Neck Pain Review:  Patient complains of neck pain. Onset of symptoms was a few weeks ago, gradually worsening since that time. Current symptoms are pain in neck (aching, dull in character; 7/10 in severity), weakness in back. Patient denies numbness, tingling, paresthesias in upper extremities. Patient denies weakness, diminished  strength, lack of coordination. Radiation of pain: . Patient has had prior neck problems. Previous treatments include: medication: . Hypertension Review:  The patient has hypertension . Diet and Lifestyle: generally follows a low sodium diet, exercises sporadically  Home BP Monitoring: is not measured at home. Pertinent ROS: taking medications as instructed, no medication side effects noted, no TIA's, no chest pain on exertion, no dyspnea on exertion, no swelling of ankles. Dyslipidemia Review:  Patient presents for evaluation of lipids. Compliance with treatment thus far has been poor. A repeat fasting lipid profile was done. The patient does not use medications that may worsen dyslipidemias . The patient exercises sporadically. The patient is not known to have coexisting coronary artery disease    He has a history of having a cerebral infarction and has been doing well thus far. Review of Systems  Constitutional: negative for fevers, chills, anorexia and weight loss  Eyes:   negative for visual disturbance and irritation  ENT:   negative for tinnitus,sore throat,nasal congestion,ear pains. hoarseness  Respiratory:  negative for cough, hemoptysis, dyspnea,wheezing  CV:   negative for chest pain, palpitations, lower extremity edema  GI:   negative for nausea, vomiting, diarrhea, abdominal pain,melena  Endo:               negative for polyuria,polydipsia,polyphagia,heat intolerance  Genitourinary: negative for frequency, dysuria and hematuria  Integument:  negative for rash and pruritus  Hematologic:  negative for easy bruising and gum/nose bleeding  Musculoskel: back pain, joint pain,neck  Neurological:  negative for headaches, dizziness, vertigo, memory problems and gait   Behavl/Psych: negative for feelings of anxiety, depression, mood changes    Past Medical History:   Diagnosis Date    Anxiety state, unspecified 8/27/2013    Arthritis     CAD (coronary artery disease)     Cataract cortical, senile 8/27/2013    Heart failure ([facility] [facility]     MI 1993    Hypertension     Hypertensive retinopathy 8/27/2013    Observation for other specified suspected conditions 8/27/2013    Observation of CCHT Program Patient    Other ill-defined conditions     chronic back pain    Other ill-defined conditions     AGENT ORANGE EXPOSURE,     Psychiatric disorder     PTSD, NO MEDS    Stroke ([facility] [address] [facility]     TIA R HAND, RESOLVED    Unspecified adverse effect of anesthesia     SLOW TO WAKE, AGITATED WHEN HE WAKES    Unspecified reason for consultation 8/27/2013    Health maintenance     Past Surgical History:   Procedure Laterality Date    ABDOMEN SURGERY PROC UNLISTED      x2 hernia repairs    CARDIAC SURG PROCEDURE UNLIST      stent, cardiologist Dr. Prosper Varma HX ORTHOPAEDIC Right     ROTATOR CUFF X2     Social History     Social History    Marital status: SINGLE     Spouse name: N/A    Number of children: N/A    Years of education: N/A     Social History Main Topics    Smoking status: Former Smoker     Packs/day: 0.50     Years: 19.00    Smokeless tobacco: Former User     Quit date: 2/28/1984    Alcohol use No    Drug use: No    Sexual activity: Not Asked     Other Topics Concern    None     Social History Narrative     Family History   Problem Relation Age of Onset    Cancer Mother     Heart Attack Mother     Cancer Father     Anesth Problems Neg Hx      Current Outpatient Prescriptions   Medication Sig Dispense Refill    fluticasone (FLONASE) 50 mcg/actuation nasal spray USE 2 SPRAYS IN BOTH NOSTRILS DAILY. 3 Bottle 3    atorvastatin (LIPITOR) 80 mg tablet Take 1 Tab by mouth daily. 30 Tab 12    clotrimazole-betamethasone (LOTRISONE) topical cream APPLY TO AREA TWICE A  g 3    aspirin delayed-release 81 mg tablet   2    COREG CR 40 mg CR capsule   5    clopidogrel (PLAVIX) 75 mg tablet   5    NITROSTAT 0.4 mg SL tablet   3    potassium chloride (K-DUR, KLOR-CON) 20 mEq tablet   5    RANEXA 500 mg SR tablet   5    valsartan-hydrochlorothiazide (DIOVAN-HCT) 320-25 mg per tablet   2    amLODIPine (NORVASC) 5 mg tablet Take 10 mg by mouth daily.  ezetimibe (ZETIA) 10 mg tablet Take  by mouth. Allergies   Allergen Reactions    Darvocet A500 [Propoxyphene N-Acetaminophen] Other (comments)     floaty/high feeling       Objective:  Visit Vitals    /80 (BP 1 Location: Right arm, BP Patient Position: Sitting)    Pulse 64    Temp 97.5 °F (36.4 °C) (Oral)    Resp 18    Ht 5' 9\" (1.753 m)    Wt 180 lb (81.6 kg)    SpO2 98%    BMI 26.58 kg/m2     Physical Exam:   General appearance - alert, well appearing, and in mild distress  Mental status - alert, oriented to person, place, and time  EYE-JARED, EOMI, corneas normal, no foreign bodies  ENT-ENT exam normal, no neck nodes or sinus tenderness  Nose - normal and patent, no erythema, discharge or polyps  Mouth - mucous membranes moist, pharynx normal without lesions  Chest - clear to auscultation, no wheezes, rales or rhonchi, symmetric air entry   Heart - normal rate, regular rhythm, normal S1, S2, no murmurs, rubs, clicks or gallops   Abdomen - soft, nontender, nondistended, no masses or organomegaly  Lymph- no adenopathy palpable  Ext-peripheral pulses normal, no pedal edema, no clubbing or cyanosis  Skin-Warm and dry.  no hyperpigmentation, vitiligo, or suspicious lesions  Neuro -alert, oriented, normal speech, no focal findings or movement disorder noted  Neck-tenderness over lower cervical spine and nuchal area, tenderness over trapezial muscles, reduced painful C-spine range of motion        Results for orders placed or performed in visit on 09/18/17   AMB POC LIPID PROFILE   Result Value Ref Range    Cholesterol (POC) 201     Triglycerides (POC) 76     HDL Cholesterol (POC) 63     LDL Cholesterol (POC) 123 MG/DL    Non-HDL Goal (POC) 138     TChol/HDL Ratio (POC) 3.2        Assessment/Plan:    ICD-10-CM ICD-9-CM    1. HTN (hypertension), benign I10 401.1 AMB POC LIPID PROFILE      METABOLIC PANEL, COMPREHENSIVE      CBC W/O DIFF   2. Screening for colon cancer Z12.11 V76.51 OCCULT BLOOD, IMMUNOASSAY (FIT)   3. Coronary artery disease involving native coronary artery of native heart without angina pectoris I25.10 414.01 AMB POC LIPID PROFILE      METABOLIC PANEL, COMPREHENSIVE      CBC W/O DIFF   4. Hypercholesteremia E78.00 272.0    5. Spondylosis of cervical region without myelopathy or radiculopathy M47.812 721.0      Orders Placed This Encounter    OCCULT BLOOD, IMMUNOASSAY (FIT)    METABOLIC PANEL, COMPREHENSIVE    CBC W/O DIFF    AMB POC LIPID PROFILE     lose weight, increase physical activity, follow low fat diet, follow low salt diet, continue present plan,Take 81mg aspirin daily  Patient Instructions   [employer] Activation    Thank you for requesting access to [employer]. Please follow the instructions below to securely access and download your online medical record. [employer] allows you to send messages to your doctor, view your test results, renew your prescriptions, schedule appointments, and more. How Do I Sign Up? 1. In your internet browser, go to [email or web address]  2. Click on the First Time User? Click Here link in the Sign In box. You will be redirect to the New Member Sign Up page. 3. Enter your [employer] Access Code exactly as it appears below. You will not need to use this code after youve completed the sign-up process.  If you do not sign up before the expiration date, you must request a new code. [email or web address] Access Code: Activation code not generated  Current [email or web address] Status: Patient Declined (This is the date your [employer] access code will )    4. Enter the last four digits of your Social Security Number (xxxx) and Date of Birth (mm/dd/yyyy) as indicated and click Submit. You will be taken to the next sign-up page. 5. Create a [employer] ID. This will be your [email or web address] login ID and cannot be changed, so think of one that is secure and easy to remember. 6. Create a [employer] password. You can change your password at any time. 7. Enter your Password Reset Question and Answer. This can be used at a later time if you forget your password. 8. Enter your e-mail address. You will receive e-mail notification when new information is available in [address] 9. Click Sign Up. You can now view and download portions of your medical record. 10. Click the Download Summary menu link to download a portable copy of your medical information. Additional Information    If you have questions, please visit the Frequently Asked Questions section of the [email or web address] website at [email or web address] Remember, [email or web address] is NOT to be used for urgent needs. For medical emergencies, dial 911. Follow-up Disposition:  Return in about 3 months (around 2017), or if symptoms worsen or fail to improve. I have reviewed with the patient details of the assessment and plan and all questions were answered. Relevent patient education was performed    An After Visit Summary was printed and given to the patient.

## 2017-09-19 LAB
ALBUMIN SERPL-MCNC: 4.2 G/DL (ref 3.5–4.8)
ALBUMIN/GLOB SERPL: 1.4 {RATIO} (ref 1.2–2.2)
ALP SERPL-CCNC: 82 IU/L (ref 39–117)
ALT SERPL-CCNC: 9 IU/L (ref 0–44)
AST SERPL-CCNC: 16 IU/L (ref 0–40)
BILIRUB SERPL-MCNC: 1.3 MG/DL (ref 0–1.2)
BUN SERPL-MCNC: 13 MG/DL (ref 8–27)
BUN/CREAT SERPL: 13 (ref 10–24)
CALCIUM SERPL-MCNC: 8.8 MG/DL (ref 8.6–10.2)
CHLORIDE SERPL-SCNC: 100 MMOL/L (ref 96–106)
CO2 SERPL-SCNC: 27 MMOL/L (ref 18–29)
CREAT SERPL-MCNC: 0.98 MG/DL (ref 0.76–1.27)
ERYTHROCYTE [DISTWIDTH] IN BLOOD BY AUTOMATED COUNT: 14.1 % (ref 12.3–15.4)
GLOBULIN SER CALC-MCNC: 2.9 G/DL (ref 1.5–4.5)
GLUCOSE SERPL-MCNC: 85 MG/DL (ref 65–99)
HCT VFR BLD AUTO: 41.9 % (ref 37.5–51)
HGB BLD-MCNC: 13.6 G/DL (ref 12.6–17.7)
MCH RBC QN AUTO: 26.8 PG (ref 26.6–33)
MCHC RBC AUTO-ENTMCNC: 32.5 G/DL (ref 31.5–35.7)
MCV RBC AUTO: 83 FL (ref 79–97)
PLATELET # BLD AUTO: 218 X10E3/UL (ref 150–379)
POTASSIUM SERPL-SCNC: 3.6 MMOL/L (ref 3.5–5.2)
PROT SERPL-MCNC: 7.1 G/DL (ref 6–8.5)
RBC # BLD AUTO: 5.08 X10E6/UL (ref 4.14–5.8)
SODIUM SERPL-SCNC: 139 MMOL/L (ref 134–144)
WBC # BLD AUTO: 3.6 X10E3/UL (ref 3.4–10.8)

## 2017-10-30 DIAGNOSIS — Z12.11 SCREEN FOR COLON CANCER: ICD-10-CM

## 2017-10-31 RX ORDER — FLUTICASONE PROPIONATE 50 MCG
SPRAY, SUSPENSION (ML) NASAL
Qty: 48 G | Refills: 3 | Status: SHIPPED | OUTPATIENT
Start: 2017-10-31 | End: 2019-01-01 | Stop reason: SDUPTHER

## 2018-01-01 ENCOUNTER — OFFICE VISIT (OUTPATIENT)
Dept: INTERNAL MEDICINE CLINIC | Age: 73
End: 2018-01-01

## 2018-01-01 ENCOUNTER — TELEPHONE (OUTPATIENT)
Dept: INTERNAL MEDICINE CLINIC | Age: 73
End: 2018-01-01

## 2018-01-01 ENCOUNTER — HOSPITAL ENCOUNTER (OUTPATIENT)
Dept: GENERAL RADIOLOGY | Age: 73
Discharge: HOME | End: 2018-12-05
Payer: MEDICARE

## 2018-01-01 VITALS
RESPIRATION RATE: 18 BRPM | DIASTOLIC BLOOD PRESSURE: 80 MMHG | BODY MASS INDEX: 26.36 KG/M2 | TEMPERATURE: 97.7 F | SYSTOLIC BLOOD PRESSURE: 158 MMHG | WEIGHT: 178 LBS | OXYGEN SATURATION: 99 % | HEIGHT: 69 IN | HEART RATE: 74 BPM

## 2018-01-01 VITALS
OXYGEN SATURATION: 98 % | SYSTOLIC BLOOD PRESSURE: 146 MMHG | TEMPERATURE: 97.7 F | HEIGHT: 69 IN | HEART RATE: 76 BPM | RESPIRATION RATE: 16 BRPM | BODY MASS INDEX: 25.92 KG/M2 | DIASTOLIC BLOOD PRESSURE: 100 MMHG | WEIGHT: 175 LBS

## 2018-01-01 DIAGNOSIS — M16.11 PRIMARY OSTEOARTHRITIS OF RIGHT HIP: ICD-10-CM

## 2018-01-01 DIAGNOSIS — I25.10 CORONARY ARTERY DISEASE INVOLVING NATIVE CORONARY ARTERY OF NATIVE HEART WITHOUT ANGINA PECTORIS: ICD-10-CM

## 2018-01-01 DIAGNOSIS — M17.11 PRIMARY OSTEOARTHRITIS OF RIGHT KNEE: Primary | ICD-10-CM

## 2018-01-01 DIAGNOSIS — Z12.11 SCREENING FOR COLON CANCER: ICD-10-CM

## 2018-01-01 DIAGNOSIS — M17.11 PRIMARY OSTEOARTHRITIS OF RIGHT KNEE: ICD-10-CM

## 2018-01-01 DIAGNOSIS — E78.00 HYPERCHOLESTEROLEMIA: ICD-10-CM

## 2018-01-01 DIAGNOSIS — Z23 ENCOUNTER FOR IMMUNIZATION: ICD-10-CM

## 2018-01-01 LAB
CHOLEST SERPL-MCNC: 155 MG/DL
HDLC SERPL-MCNC: 73 MG/DL
HEMOCCULT STL QL IA: NEGATIVE
LDL CHOLESTEROL POC: 71 MG/DL
NON-HDL GOAL (POC): 81
TCHOL/HDL RATIO (POC): 2.1
TRIGL SERPL-MCNC: 51 MG/DL

## 2018-01-01 PROCEDURE — 73502 X-RAY EXAM HIP UNI 2-3 VIEWS: CPT

## 2018-01-01 PROCEDURE — 73562 X-RAY EXAM OF KNEE 3: CPT

## 2018-01-01 RX ORDER — IRBESARTAN AND HYDROCHLOROTHIAZIDE 300; 12.5 MG/1; MG/1
TABLET, FILM COATED ORAL
Refills: 3 | COMMUNITY
Start: 2018-01-01 | End: 2019-01-01 | Stop reason: DRUGHIGH

## 2018-01-01 RX ORDER — LIDOCAINE HYDROCHLORIDE 20 MG/ML
1 INJECTION, SOLUTION EPIDURAL; INFILTRATION; INTRACAUDAL; PERINEURAL ONCE
Qty: 1 ML | Refills: 0
Start: 2018-01-01 | End: 2018-01-01

## 2018-01-01 RX ORDER — TRIAMCINOLONE ACETONIDE 40 MG/ML
40 INJECTION, SUSPENSION INTRA-ARTICULAR; INTRAMUSCULAR ONCE
Qty: 1 ML | Refills: 0
Start: 2018-01-01 | End: 2018-01-01

## 2018-08-27 ENCOUNTER — OFFICE VISIT (OUTPATIENT)
Dept: INTERNAL MEDICINE CLINIC | Age: 73
End: 2018-08-27

## 2018-08-27 VITALS
TEMPERATURE: 98.2 F | DIASTOLIC BLOOD PRESSURE: 90 MMHG | OXYGEN SATURATION: 97 % | BODY MASS INDEX: 25.92 KG/M2 | RESPIRATION RATE: 19 BRPM | HEART RATE: 82 BPM | WEIGHT: 175 LBS | SYSTOLIC BLOOD PRESSURE: 160 MMHG | HEIGHT: 69 IN

## 2018-08-27 DIAGNOSIS — E78.00 HYPERCHOLESTEROLEMIA: Primary | ICD-10-CM

## 2018-08-27 DIAGNOSIS — B36.0 TINEA VERSICOLOR: ICD-10-CM

## 2018-08-27 DIAGNOSIS — I10 HTN (HYPERTENSION), BENIGN: ICD-10-CM

## 2018-08-27 DIAGNOSIS — Z12.11 SCREEN FOR COLON CANCER: ICD-10-CM

## 2018-08-27 DIAGNOSIS — Z00.00 MEDICARE ANNUAL WELLNESS VISIT, SUBSEQUENT: ICD-10-CM

## 2018-08-27 DIAGNOSIS — I25.10 CORONARY ARTERY DISEASE INVOLVING NATIVE CORONARY ARTERY OF NATIVE HEART WITHOUT ANGINA PECTORIS: ICD-10-CM

## 2018-08-27 LAB
CHOLEST SERPL-MCNC: 220 MG/DL
HDLC SERPL-MCNC: 68 MG/DL
LDL CHOLESTEROL POC: 138 MG/DL
NON-HDL GOAL (POC): 152
TCHOL/HDL RATIO (POC): 3.2
TRIGL SERPL-MCNC: 71 MG/DL

## 2018-08-27 RX ORDER — CLOTRIMAZOLE AND BETAMETHASONE DIPROPIONATE 10; .64 MG/G; MG/G
CREAM TOPICAL
Qty: 135 G | Refills: 3 | Status: SHIPPED | OUTPATIENT
Start: 2018-08-27 | End: 2019-01-01

## 2018-08-27 NOTE — PROGRESS NOTES
1. Have you been to the ER, urgent care clinic since your last visit? Hospitalized since your last visit?no    2. Have you seen or consulted any other health care providers outside of the [address] [place] [address] since your last visit? Include any pap smears or colon screening.  No  PHQ over the last two weeks 8/27/2018   PHQ Not Done -   Little interest or pleasure in doing things Not at all   Feeling down, depressed, irritable, or hopeless Not at all   Total Score PHQ 2 0

## 2018-08-27 NOTE — PROGRESS NOTES
Sara Simpson is a 71 y.o. male and presents with Annual Wellness Visit; Hypertension; and Coronary Artery Disease  . Subjective:  Hypertension Review:  The patient has essential hypertension  Diet and Lifestyle: generally follows a  low sodium diet, exercises sporadically  Home BP Monitoring: is not measured at home. Pertinent ROS: taking medications as instructed, no medication side effects noted, no TIA's, no chest pain on exertion, no dyspnea on exertion, no swelling of ankles. Dyslipidemia Review:  Patient presents for evaluation of lipids. Compliance with treatment thus far has been excellent. A repeat fasting lipid profile was done. The patient does not use medications that may worsen dyslipidemias (corticosteroids, progestins, anabolic steroids, diuretics, beta-blockers, amiodarone, cyclosporine, olanzapine). The patient exercises some      Coronary Disease Review:  Patient complains of no chest pain today. There has not been the need to use NTG. Previous cardiac testing has included: Electrocardiogram (EKG), Echocardiogram, CABG/Stent placement. Sara Simpson is a 71 y.o. male and presents for annual Medicare Wellness Visit. Problem List: Reviewed with patient and discussed risk factors.     Patient Active Problem List   Diagnosis Code    Hypercholesterolemia E78.00    CAD (coronary artery disease) I25.10    HTN (hypertension), benign I10    Anxiety state, unspecified F41.1    Cataract cortical, senile H25.019    Unspecified reason for consultation Z71.9    Hypertensive retinopathy H35.039    Observation for other specified suspected conditions Z03.89    Rotator cuff tear, left M75.102    Stroke (cerebrum) ([facility] I63.9    Stenosis of both internal carotid arteries I65.23    Cerebral infarction involving right middle cerebral artery ([facility]) I24.013       Current medical providers:  Patient Care Team:  Tran Sparks MD as PCP - General (Internal Medicine)  Lennox Moctezuma RN as Nurse Navigator    PSH: Reviewed with patient  Past Surgical History:   Procedure Laterality Date    ABDOMEN SURGERY PROC UNLISTED      x2 hernia repairs    CARDIAC SURG PROCEDURE UNLIST      stent, cardiologist Dr. Mariano Odell HX ORTHOPAEDIC Right     Corrie Look X2        SH: Reviewed with patient  Social History   Substance Use Topics    Smoking status: Former Smoker     Packs/day: 0.50     Years: 19.00    Smokeless tobacco: Former User     Quit date: 2/28/1984    Alcohol use No       FH: Reviewed with patient  Family History   Problem Relation Age of Onset    Cancer Mother     Heart Attack Mother     Cancer Father     Anesth Problems Neg Hx        Medications/Allergies: Reviewed with patient  Current Outpatient Prescriptions on File Prior to Visit   Medication Sig Dispense Refill    fluticasone (FLONASE) 50 mcg/actuation nasal spray USE 2 SPRAYS IN EACH NOSTRIL DAILY 48 g 3    atorvastatin (LIPITOR) 80 mg tablet Take 1 Tab by mouth daily. 30 Tab 12    aspirin delayed-release 81 mg tablet   2    COREG CR 40 mg CR capsule   5    clopidogrel (PLAVIX) 75 mg tablet   5    NITROSTAT 0.4 mg SL tablet   3    potassium chloride (K-DUR, KLOR-CON) 20 mEq tablet   5    RANEXA 500 mg SR tablet   5    valsartan-hydrochlorothiazide (DIOVAN-HCT) 320-25 mg per tablet   2    amLODIPine (NORVASC) 5 mg tablet Take 10 mg by mouth daily.  ezetimibe (ZETIA) 10 mg tablet Take  by mouth. No current facility-administered medications on file prior to visit. Allergies   Allergen Reactions    Darvocet A500 [Propoxyphene N-Acetaminophen] Other (comments)     floaty/high feeling       Objective:  Visit Vitals    /90 (BP 1 Location: Right arm, BP Patient Position: Sitting)    Pulse 82    Temp 98.2 °F (36.8 °C) (Oral)    Resp 19    Ht 5' 9\" (1.753 m)    Wt 175 lb (79.4 kg)    SpO2 97%    BMI 25.84 kg/m2    Body mass index is 25.84 kg/(m^2).     Assessment of cognitive impairment: Alert and oriented x 3    Depression Screen:   PHQ over the last two weeks 8/27/2018   PHQ Not Done Medical Reason (indicate in comments)   Little interest or pleasure in doing things Not at all   Feeling down, depressed, irritable, or hopeless Not at all   Total Score PHQ 2 0     Depression Review:  Patient is seen for screen of depression,denies anhedonia, weight gain, insomnia, hypersomnia, psychomotor agitation, psychomotor retardation, fatigue, feelings of worthlessness/guilt, difficulty concentrating, hopelessness, impaired memory and recurrent thoughts of death Treatment includes no medication   She denies recurrent thoughts of death and suicidal thoughts without plan. Fall Risk Assessment:    Fall Risk Assessment, last 12 mths 8/27/2018   Able to walk? Yes   Fall in past 12 months? No       Functional Ability:   Does the patient exhibit a steady gait? yes   How long did it take the patient to get up and walk from a sitting position? seconds   Is the patient self reliant?  (ie can do own laundry, meals, household chores)  yes     Does the patient handle his/her own medications? yes     Does the patient handle his/her own money? yes     Is the patients home safe (ie good lighting, handrails on stairs and bath, etc.)? yes     Did you notice or did patient express any hearing difficulties? no     Did you notice or did patient express any vision difficulties?   no     Were distance and reading eye charts used? no       Advance Care Planning:   Patient was offered the opportunity to discuss advance care planning:  yes     Does patient have an Advance Directive:  no   If no, did you provide information on [facility]ctions?   yes       Plan:      Orders Placed This Encounter    CBC W/O DIFF    METABOLIC PANEL, COMPREHENSIVE    OCCULT BLOOD IMMUNOASSAY,DIAGNOSTIC    AMB POC LIPID PROFILE    clotrimazole-betamethasone (LOTRISONE) topical cream       Health Maintenance   Topic Date Due    FOBT Q 1 YEAR AGE 50-75  11/24/2016    Pneumococcal 65+ Low/Medium Risk (2 of 2 - PPSV23) 02/07/2017    GLAUCOMA SCREENING Q2Y  08/14/2017    Influenza Age 9 to Adult  08/01/2018    MEDICARE YEARLY EXAM  08/28/2019    DTaP/Tdap/Td series (2 - Td) 11/17/2024    Hepatitis C Screening  Addressed    ZOSTER VACCINE AGE 60>  Addressed       *Patient verbalized understanding and agreement with the plan. A copy of the After Visit Summary with personalized health plan was given to the patient today. Review of Systems  Constitutional: negative for fevers, chills, anorexia and weight loss  Eyes:   negative for visual disturbance and irritation  ENT:   negative for tinnitus,sore throat,nasal congestion,ear pains. hoarseness  Respiratory:  negative for cough, hemoptysis, dyspnea,wheezing  CV:   negative for chest pain, palpitations, lower extremity edema  GI:   negative for nausea, vomiting, diarrhea, abdominal pain,melena  Endo:               negative for polyuria,polydipsia,polyphagia,heat intolerance  Genitourinary: negative for frequency, dysuria and hematuria  Integument:  negative for rash and pruritus  Hematologic:  negative for easy bruising and gum/nose bleeding  Musculoskel: negative for myalgias, arthralgias, back pain, muscle weakness, joint pain  Neurological:  negative for headaches, dizziness, vertigo, memory problems and gait   Behavl/Psych: negative for feelings of anxiety, depression, mood changes    Past Medical History:   Diagnosis Date    Anxiety state, unspecified 8/27/2013    Arthritis     CAD (coronary artery disease)     Cataract cortical, senile 8/27/2013    Heart failure ([facility] [address]     MI 1993    Hypertension     Hypertensive retinopathy 8/27/2013    Observation for other specified suspected conditions 8/27/2013    [facility]    Other ill-defined conditions(799.89)     chronic back pain    Other ill-defined conditions(799.89)     AGENT ORANGE EXPOSURE,     Psychiatric disorder PTSD, NO MEDS    Stroke ([facility] [address]     TIA R HAND, RESOLVED    Unspecified adverse effect of anesthesia     SLOW TO WAKE, AGITATED WHEN HE WAKES    Unspecified reason for consultation 8/27/2013    Health maintenance     Past Surgical History:   Procedure Laterality Date    ABDOMEN SURGERY PROC UNLISTED      x2 hernia repairs    CARDIAC SURG PROCEDURE UNLIST      stent, cardiologist Dr. Fozia Ziegler HX ORTHOPAEDIC Right     ROTATOR CUFF X2     Social History     Social History    Marital status: SINGLE     Spouse name: N/A    Number of children: N/A    Years of education: N/A     Social History Main Topics    Smoking status: Former Smoker     Packs/day: 0.50     Years: 19.00    Smokeless tobacco: Former User     Quit date: 2/28/1984    Alcohol use No    Drug use: No    Sexual activity: Not Asked     Other Topics Concern    None     Social History Narrative     Family History   Problem Relation Age of Onset    Cancer Mother     Heart Attack Mother     Cancer Father     Anesth Problems Neg Hx      Current Outpatient Prescriptions   Medication Sig Dispense Refill    clotrimazole-betamethasone (LOTRISONE) topical cream APPLY TO AREA TWICE A  g 3    fluticasone (FLONASE) 50 mcg/actuation nasal spray USE 2 SPRAYS IN EACH NOSTRIL DAILY 48 g 3    atorvastatin (LIPITOR) 80 mg tablet Take 1 Tab by mouth daily. 30 Tab 12    aspirin delayed-release 81 mg tablet   2    COREG CR 40 mg CR capsule   5    clopidogrel (PLAVIX) 75 mg tablet   5    NITROSTAT 0.4 mg SL tablet   3    potassium chloride (K-DUR, KLOR-CON) 20 mEq tablet   5    RANEXA 500 mg SR tablet   5    valsartan-hydrochlorothiazide (DIOVAN-HCT) 320-25 mg per tablet   2    amLODIPine (NORVASC) 5 mg tablet Take 10 mg by mouth daily.  ezetimibe (ZETIA) 10 mg tablet Take  by mouth.        Allergies   Allergen Reactions    Darvocet A500 [Propoxyphene N-Acetaminophen] Other (comments)     floaty/high feeling       Objective:  Visit Vitals  /90 (BP 1 Location: Right arm, BP Patient Position: Sitting)    Pulse 82    Temp 98.2 °F (36.8 °C) (Oral)    Resp 19    Ht 5' 9\" (1.753 m)    Wt 175 lb (79.4 kg)    SpO2 97%    BMI 25.84 kg/m2     Physical Exam:   General appearance - alert, well appearing, and in no distress  Mental status - alert, oriented to person, place, and time  EYE-JARED, EOMI, corneas normal, no foreign bodies  ENT-ENT exam normal, no neck nodes or sinus tenderness  Nose - normal and patent, no erythema, discharge or polyps  Mouth - mucous membranes moist, pharynx normal without lesions  Neck - supple, no significant adenopathy   Chest - clear to auscultation, no wheezes, rales or rhonchi, symmetric air entry   Heart - normal rate, regular rhythm, normal S1, S2, no murmurs, rubs, clicks or gallops   Abdomen - soft, nontender, nondistended, no masses or organomegaly  Lymph- no adenopathy palpable  Ext-peripheral pulses normal, no pedal edema, no clubbing or cyanosis  Skin-Warm and dry. no hyperpigmentation, vitiligo, or suspicious lesions  Neuro -alert, oriented, normal speech, no focal findings or movement disorder noted  Neck-normal C-spine, no tenderness, full ROM without pain  Feet-no nail deformities or callus formation with good pulses noted      Results for orders placed or performed in visit on 08/27/18   AMB POC LIPID PROFILE   Result Value Ref Range    Cholesterol (POC) 220     Triglycerides (POC) 71     HDL Cholesterol (POC) 68     LDL Cholesterol (POC) 138 MG/DL    Non-HDL Goal (POC) 152     TChol/HDL Ratio (POC) 3.2        Assessment/Plan:    ICD-10-CM ICD-9-CM    1. Hypercholesterolemia E78.00 272.0 AMB POC LIPID PROFILE   2. Tinea versicolor B36.0 111.0 clotrimazole-betamethasone (LOTRISONE) topical cream   3. Screen for colon cancer Z12.11 V76.51 OCCULT BLOOD IMMUNOASSAY,DIAGNOSTIC   4. Coronary artery disease involving native coronary artery of native heart without angina pectoris I25.10 414.01    5.  HTN (hypertension), benign I10 401.1 CBC W/O DIFF      METABOLIC PANEL, COMPREHENSIVE   6. Medicare annual wellness visit, subsequent Z00.00 V70.0      Orders Placed This Encounter    CBC W/O DIFF    METABOLIC PANEL, COMPREHENSIVE    OCCULT BLOOD IMMUNOASSAY,DIAGNOSTIC    AMB POC LIPID PROFILE    clotrimazole-betamethasone (LOTRISONE) topical cream     Sig: APPLY TO AREA TWICE A DAY     Dispense:  135 g     Refill:  3     lose weight, increase physical activity, follow low fat diet, follow low salt diet  Patient Instructions   MyChart Activation    Thank you for requesting access to [employer]. Please follow the instructions below to securely access and download your online medical record. [employer] allows you to send messages to your doctor, view your test results, renew your prescriptions, schedule appointments, and more. How Do I Sign Up? 1. In your internet browser, go to [email or web address]  2. Click on the First Time User? Click Here link in the Sign In box. You will be redirect to the New Member Sign Up page. 3. Enter your [employer] Access Code exactly as it appears below. You will not need to use this code after youve completed the sign-up process. If you do not sign up before the expiration date, you must request a new code. [employer] Access Code: [email or web address]  Expires: 2018  8:30 AM (This is the date your [employer] access code will )    4. Enter the last four digits of your Social Security Number (xxxx) and Date of Birth (mm/dd/yyyy) as indicated and click Submit. You will be taken to the next sign-up page. 5. Create a [employer] ID. This will be your [employer] login ID and cannot be changed, so think of one that is secure and easy to remember. 6. Create a [employer] password. You can change your password at any time. 7. Enter your Password Reset Question and Answer. This can be used at a later time if you forget your password. 8. Enter your e-mail address.  You will receive e-mail notification when new information is available in [address] 9. Click Sign Up. You can now view and download portions of your medical record. 10. Click the Download Summary menu link to download a portable copy of your medical information. Additional Information    If you have questions, please visit the Frequently Asked Questions section of the [employer] website at [email or web address] Remember, [employer] is NOT to be used for urgent needs. For medical emergencies, dial 911. Follow-up Disposition:  Return in about 3 months (around 11/27/2018), or if symptoms worsen or fail to improve. I have reviewed with the patient details of the assessment and plan and all questions were answered. Relevent patient education was performed    An After Visit Summary was printed and given to the patient.

## 2018-08-27 NOTE — PATIENT INSTRUCTIONS
[employer] Activation    Thank you for requesting access to [employer]. Please follow the instructions below to securely access and download your online medical record. [employer] allows you to send messages to your doctor, view your test results, renew your prescriptions, schedule appointments, and more. How Do I Sign Up? 1. In your internet browser, go to [email or web address]  2. Click on the First Time User? Click Here link in the Sign In box. You will be redirect to the New Member Sign Up page. 3. Enter your [employer] Access Code exactly as it appears below. You will not need to use this code after youve completed the sign-up process. If you do not sign up before the expiration date, you must request a new code. [employer] Access Code: [email or web address]  Expires: 2018  8:30 AM (This is the date your [employer] access code will )    4. Enter the last four digits of your Social Security Number (xxxx) and Date of Birth (mm/dd/yyyy) as indicated and click Submit. You will be taken to the next sign-up page. 5. Create a [employer] ID. This will be your [employer] login ID and cannot be changed, so think of one that is secure and easy to remember. 6. Create a [employer] password. You can change your password at any time. 7. Enter your Password Reset Question and Answer. This can be used at a later time if you forget your password. 8. Enter your e-mail address. You will receive e-mail notification when new information is available in [address] 9. Click Sign Up. You can now view and download portions of your medical record. 10. Click the Download Summary menu link to download a portable copy of your medical information. Additional Information    If you have questions, please visit the Frequently Asked Questions section of the [employer] website at [email or web address] Remember, [employer] is NOT to be used for urgent needs. For medical emergencies, dial 911.

## 2018-08-27 NOTE — MR AVS SNAPSHOT
[address] [place] [address],[address] Floor [facility] 7 [id number] 
[phone] Patient: Yuki Carson MRN: [id number] :1945 Visit Information Date & Time Provider Department Dept. Phone Encounter #  
 2018  8:15 AM Damaris Mike MD [address] [facility] [phone] [id number] Follow-up Instructions Return in about 3 months (around 2018), or if symptoms worsen or fail to improve. Upcoming Health Maintenance Date Due FOBT Q 1 YEAR AGE 50-75 2016 Pneumococcal 65+ Low/Medium Risk (2 of 2 - PPSV23) 2017 GLAUCOMA SCREENING Q2Y 2017 Influenza Age 5 to Adult 2018 MEDICARE YEARLY EXAM 2019 DTaP/Tdap/Td series (2 - Td) 2024 Allergies as of 2018  Review Complete On: 2018 By: Damaris Mike MD  
  
 Severity Noted Reaction Type Reactions Darvocet A500 [Propoxyphene N-acetaminophen]  2011    Other (comments)  
 floaty/high feeling Current Immunizations  Reviewed on 2014 Name Date Influenza Vaccine 2014, 2012 Pneumococcal Vaccine (Unspecified Type) 2012 Td 5/10/2011 Tdap 2014 Not reviewed this visit You Were Diagnosed With   
  
 Codes Comments Hypercholesterolemia    -  Primary ICD-10-CM: E78.00 ICD-9-CM: 272.0 Tinea versicolor     ICD-10-CM: B36.0 ICD-9-CM: 111.0 Screen for colon cancer     ICD-10-CM: Z12.11 ICD-9-CM: V76.51 Coronary artery disease involving native coronary artery of native heart without angina pectoris     ICD-10-CM: I25.10 ICD-9-CM: 414.01   
 HTN (hypertension), benign     ICD-10-CM: I10 
ICD-9-CM: 401.1 Medicare annual wellness visit, subsequent     ICD-10-CM: Z00.00 ICD-9-CM: V70.0 Vitals BP Pulse Temp Resp Height(growth percentile) Weight(growth percentile)  160/90 (BP 1 Location: Right arm, BP Patient Position: Sitting) 82 98.2 °F (36.8 °C) (Oral) 19 5' 9\" (1.753 m) 175 lb (79.4 kg) SpO2 BMI Smoking Status 97% 25.84 kg/m2 Former Smoker BMI and BSA Data Body Mass Index Body Surface Area  
 25.84 kg/m 2 1.97 m 2 Preferred Pharmacy Pharmacy Name Phone Jonelle Smith, [facility] [phone] Your Updated Medication List  
  
   
This list is accurate as of 8/27/18  8:37 AM.  Always use your most recent med list. amLODIPine 5 mg tablet Commonly known as:  Ama Fraction Take 10 mg by mouth daily. aspirin delayed-release 81 mg tablet  
  
 atorvastatin 80 mg tablet Commonly known as:  LIPITOR Take 1 Tab by mouth daily. clopidogrel 75 mg Tab Commonly known as:  PLAVIX  
  
 clotrimazole-betamethasone topical cream  
Commonly known as:  LOTRISONE  
APPLY TO AREA TWICE A DAY  
  
 COREG CR 40 mg CR capsule Generic drug:  carvedilol  
  
 fluticasone 50 mcg/actuation nasal spray Commonly known as:  FLONASE  
USE 2 SPRAYS IN EACH NOSTRIL DAILY NITROSTAT 0.4 mg SL tablet Generic drug:  nitroglycerin  
  
 potassium chloride 20 mEq tablet Commonly known as:  K-DUR, KLOR-CON  
  
 RANEXA 500 mg SR tablet Generic drug:  ranolazine ER  
  
 valsartan-hydroCHLOROthiazide 320-25 mg per tablet Commonly known as:  DIOVAN-HCT  
  
 ZETIA 10 mg tablet Generic drug:  ezetimibe Take  by mouth. Prescriptions Sent to Pharmacy Refills  
 clotrimazole-betamethasone (LOTRISONE) topical cream 3 Sig: APPLY TO AREA TWICE A DAY Class: Normal  
 Pharmacy: [address], [address] [place] [address] #: [phone] We Performed the Following AMB POC LIPID PROFILE [82253 CPT(R)] CBC W/O DIFF [29891 CPT(R)] METABOLIC PANEL, COMPREHENSIVE [72652 CPT(R)] OCCULT BLOOD IMMUNOASSAY,DIAGNOSTIC [36351 CPT(R)] Follow-up Instructions Return in about 3 months (around 2018), or if symptoms worsen or fail to improve. Patient Instructions MyChart Activation Thank you for requesting access to [employer]. Please follow the instructions below to securely access and download your online medical record. [employer] allows you to send messages to your doctor, view your test results, renew your prescriptions, schedule appointments, and more. How Do I Sign Up? 1. In your internet browser, go to [email or web address].[email or web address] 
2. Click on the First Time User? Click Here link in the Sign In box. You will be redirect to the New Member Sign Up page. 3. Enter your [employer] Access Code exactly as it appears below. You will not need to use this code after youve completed the sign-up process. If you do not sign up before the expiration date, you must request a new code. [employer] Access Code: [email or web address] Expires: 2018  8:30 AM (This is the date your [employer] access code will ) 4. Enter the last four digits of your Social Security Number (xxxx) and Date of Birth (mm/dd/yyyy) as indicated and click Submit. You will be taken to the next sign-up page. 5. Create a [employer] ID. This will be your [employer] login ID and cannot be changed, so think of one that is secure and easy to remember. 6. Create a [employer] password. You can change your password at any time. 7. Enter your Password Reset Question and Answer. This can be used at a later time if you forget your password. 8. Enter your e-mail address. You will receive e-mail notification when new information is available in [address] 9. Click Sign Up. You can now view and download portions of your medical record. 10. Click the Download Summary menu link to download a portable copy of your medical information. Additional Information If you have questions, please visit the Frequently Asked Questions section of the [employer] website at [email or web address] Remember, [employer] is NOT to be used for urgent needs. For medical emergencies, dial 911. Introducing [facility]! [address] introduces [employer] patient portal. Now you can access parts of your medical record, email your doctor's office, and request medication refills online. 1. In your internet browser, go to [email or web address] 2. Click on the First Time User? Click Here link in the Sign In box. You will see the New Member Sign Up page. 3. Enter your [employer] Access Code exactly as it appears below. You will not need to use this code after youve completed the sign-up process. If you do not sign up before the expiration date, you must request a new code. · [employer] Access Code: [email or web address] Expires: 11/25/2018  8:30 AM 
 
4. Enter the last four digits of your Social Security Number (xxxx) and Date of Birth (mm/dd/yyyy) as indicated and click Submit. You will be taken to the next sign-up page. 5. Create a Restaro ID. This will be your Restaro login ID and cannot be changed, so think of one that is secure and easy to remember. 6. Create a [employer] password. You can change your password at any time. 7. Enter your Password Reset Question and Answer. This can be used at a later time if you forget your password. 8. Enter your e-mail address. You will receive e-mail notification when new information is available in [address] 9. Click Sign Up. You can now view and download portions of your medical record. 10. Click the Download Summary menu link to download a portable copy of your medical information. If you have questions, please visit the Frequently Asked Questions section of the [employer] website. Remember, [employer] is NOT to be used for urgent needs. For medical emergencies, dial 911. Now available from your iPhone and Android! Please provide this summary of care documentation to your next provider. Your primary care clinician is listed as Ariadna Kovacs. If you have any questions after today's visit, please call [phone].

## 2018-08-28 LAB
ALBUMIN SERPL-MCNC: 4.1 G/DL (ref 3.5–4.8)
ALBUMIN/GLOB SERPL: 1.4 {RATIO} (ref 1.2–2.2)
ALP SERPL-CCNC: 81 IU/L (ref 39–117)
ALT SERPL-CCNC: 8 IU/L (ref 0–44)
AST SERPL-CCNC: 16 IU/L (ref 0–40)
BILIRUB SERPL-MCNC: 0.9 MG/DL (ref 0–1.2)
BUN SERPL-MCNC: 16 MG/DL (ref 8–27)
BUN/CREAT SERPL: 14 (ref 10–24)
CALCIUM SERPL-MCNC: 8.7 MG/DL (ref 8.6–10.2)
CHLORIDE SERPL-SCNC: 100 MMOL/L (ref 96–106)
CO2 SERPL-SCNC: 25 MMOL/L (ref 20–29)
CREAT SERPL-MCNC: 1.18 MG/DL (ref 0.76–1.27)
ERYTHROCYTE [DISTWIDTH] IN BLOOD BY AUTOMATED COUNT: 13.6 % (ref 12.3–15.4)
GLOBULIN SER CALC-MCNC: 2.9 G/DL (ref 1.5–4.5)
GLUCOSE SERPL-MCNC: 90 MG/DL (ref 65–99)
HCT VFR BLD AUTO: 43.4 % (ref 37.5–51)
HGB BLD-MCNC: 13.5 G/DL (ref 13–17.7)
MCH RBC QN AUTO: 26.3 PG (ref 26.6–33)
MCHC RBC AUTO-ENTMCNC: 31.1 G/DL (ref 31.5–35.7)
MCV RBC AUTO: 85 FL (ref 79–97)
PLATELET # BLD AUTO: 213 X10E3/UL (ref 150–379)
POTASSIUM SERPL-SCNC: 3.8 MMOL/L (ref 3.5–5.2)
PROT SERPL-MCNC: 7 G/DL (ref 6–8.5)
RBC # BLD AUTO: 5.13 X10E6/UL (ref 4.14–5.8)
SODIUM SERPL-SCNC: 139 MMOL/L (ref 134–144)
WBC # BLD AUTO: 2.8 X10E3/UL (ref 3.4–10.8)

## 2018-12-04 NOTE — PATIENT INSTRUCTIONS
[employer] Activation Thank you for requesting access to [employer]. Please follow the instructions below to securely access and download your online medical record. [employer] allows you to send messages to your doctor, view your test results, renew your prescriptions, schedule appointments, and more. How Do I Sign Up? 1. In your internet browser, go to [email or web address].[email or web address] 
2. Click on the First Time User? Click Here link in the Sign In box. You will be redirect to the New Member Sign Up page. 3. Enter your [employer] Access Code exactly as it appears below. You will not need to use this code after youve completed the sign-up process. If you do not sign up before the expiration date, you must request a new code. [employer] Access Code: [email or web address] Expires: 3/4/2019  9:13 AM (This is the date your [employer] access code will ) 4. Enter the last four digits of your Social Security Number (xxxx) and Date of Birth (mm/dd/yyyy) as indicated and click Submit. You will be taken to the next sign-up page. 5. Create a [employer] ID. This will be your [employer] login ID and cannot be changed, so think of one that is secure and easy to remember. 6. Create a [employer] password. You can change your password at any time. 7. Enter your Password Reset Question and Answer. This can be used at a later time if you forget your password. 8. Enter your e-mail address. You will receive e-mail notification when new information is available in [address] 9. Click Sign Up. You can now view and download portions of your medical record. 10. Click the Download Summary menu link to download a portable copy of your medical information. Additional Information If you have questions, please visit the Frequently Asked Questions section of the [employer] website at [email or web address] Remember, [employer] is NOT to be used for urgent needs. For medical emergencies, dial 911.

## 2018-12-04 NOTE — PROGRESS NOTES
Meir Mariano is a 68 y.o. male and presents with Coronary Artery Disease; Hypertension; Joint Pain (HIP AND KNEE); and Immunization/Injection Aparna Copper Subjective: 
 
Health maintenance suggests the needs for a test for occult blood Health maintenance suggests the needs for an influenza injection Knee pain Review: 
Patient complains of right knee pain. Onset of the symptoms was months  ago. Inciting event: longstanding problem which has been getting worse. Current symptoms include pain location: both: medial and swelling. none,  Aggravating symptoms: going up and down stairs, walking, lateral movements, any weight bearing. Patient's overall course: gradually worsening Patient has had prior knee problems. Evaluation to date: none. Treatment to date:NSAIDS Hip Pain Review Patient presents for evaluation of hip pains for a few day(s). Pain is located inright   hip,rated as a scale of 8/10  is described as aching, dull, and is intermittent . Associated symptoms include: decreased ROM. Related to injury:   no. The patient has tried NSAIDs for pain, with minimal relief. Coronary Disease Review: 
Patient complains of no chest pain today. There has not been the need to use NTG. Previous cardiac testing has included: Electrocardiogram (EKG), Echocardiogram, CABG/Stent placement. Hypertension Review: 
The patient has essential hypertension Diet and Lifestyle: generally follows a  low sodium diet, exercises sporadically Home BP Monitoring: is not measured at home. Pertinent ROS: taking medications as instructed, no medication side effects noted, no TIA's, no chest pain on exertion, no dyspnea on exertion, no swelling of ankles. Review of Systems Constitutional: negative for fevers, chills, anorexia and weight loss Eyes:   negative for visual disturbance and irritation ENT:   negative for tinnitus,sore throat,nasal congestion,ear pains. hoarseness Respiratory:  negative for cough, hemoptysis, dyspnea,wheezing CV:   negative for chest pain, palpitations, lower extremity edema GI:   negative for nausea, vomiting, diarrhea, abdominal pain,melena Endo:               negative for polyuria,polydipsia,polyphagia,heat intolerance Genitourinary: negative for frequency, dysuria and hematuria Integument:  negative for rash and pruritus Hematologic:  negative for easy bruising and gum/nose bleeding Musculoskel: negative for myalgias, arthralgias, back pain, muscle weakness, joint pain Neurological:  negative for headaches, dizziness, vertigo, memory problems and gait Behavl/Psych: negative for feelings of anxiety, depression, mood changes Past Medical History:  
Diagnosis Date  Anxiety state, unspecified 8/27/2013  Arthritis  CAD (coronary artery disease)  Cataract cortical, senile 8/27/2013  Heart failure ([facility] [address] [place] 1993  Hypertension  Hypertensive retinopathy 8/27/2013  Observation for other specified suspected conditions 8/27/2013 [facility]  Other ill-defined conditions(799.89) chronic back pain  Other ill-defined conditions(799.89) AGENT ORANGE EXPOSURE,   
 Psychiatric disorder PTSD, NO MEDS  Stroke ([facility] [address] TIA R HAND, RESOLVED  Unspecified adverse effect of anesthesia SLOW TO WAKE, AGITATED WHEN HE WAKES  
 Unspecified reason for consultation 8/27/2013 Health maintenance Past Surgical History:  
Procedure Laterality Date  ABDOMEN SURGERY PROC UNLISTED    
 x2 hernia repairs  CARDIAC SURG PROCEDURE UNLIST    
 stent, cardiologist Dr. Corrie Coronel  HX ORTHOPAEDIC Right ROTATOR CUFF X2 Social History Socioeconomic History  Marital status: SINGLE Spouse name: Not on file  Number of children: Not on file  Years of education: Not on file  Highest education level: Not on file Tobacco Use  Smoking status: Former Smoker Packs/day: 0.50 Years: 19.00 Pack years: 9.50  Smokeless tobacco: Former User Quit date: 2/28/1984 Substance and Sexual Activity  Alcohol use: No  
 Drug use: No  
 
Family History Problem Relation Age of Onset  Cancer Mother  Heart Attack Mother  Cancer Father  Anesth Problems Neg Hx Current Outpatient Medications Medication Sig Dispense Refill  irbesartan-hydroCHLOROthiazide (AVALIDE) 300-12.5 mg per tablet TK 1 T PO ONCE D  3  
 clotrimazole-betamethasone (LOTRISONE) topical cream APPLY TO AREA TWICE A  g 3  
 fluticasone (FLONASE) 50 mcg/actuation nasal spray USE 2 SPRAYS IN EACH NOSTRIL DAILY 48 g 3  
 atorvastatin (LIPITOR) 80 mg tablet Take 1 Tab by mouth daily. 30 Tab 12  
 aspirin delayed-release 81 mg tablet   2  
 COREG CR 40 mg CR capsule   5  clopidogrel (PLAVIX) 75 mg tablet   5  
 NITROSTAT 0.4 mg SL tablet   3  potassium chloride (K-DUR, KLOR-CON) 20 mEq tablet   5  
 RANEXA 500 mg SR tablet   5  
 amLODIPine (NORVASC) 5 mg tablet Take 10 mg by mouth daily.  ezetimibe (ZETIA) 10 mg tablet Take  by mouth.  valsartan-hydrochlorothiazide (DIOVAN-HCT) 320-25 mg per tablet   2 Allergies Allergen Reactions  Darvocet A500 [Propoxyphene N-Acetaminophen] Other (comments)  
  floaty/high feeling Objective: 
Visit Vitals /80 (BP 1 Location: Left arm, BP Patient Position: Sitting) Pulse 74 Temp 97.7 °F (36.5 °C) (Oral) Resp 18 Ht 5' 9\" (1.753 m) Wt 178 lb (80.7 kg) SpO2 99% BMI 26.29 kg/m² Physical Exam:  
General appearance - alert, well appearing, and in no distress Mental status - alert, oriented to person, place, and time EYE-JARED, EOMI, corneas normal, no foreign bodies ENT-ENT exam normal, no neck nodes or sinus tenderness Nose - normal and patent, no erythema, discharge or polyps Mouth - mucous membranes moist, pharynx normal without lesions Neck - supple, no significant adenopathy Chest - clear to auscultation, no wheezes, rales or rhonchi, symmetric air entry Heart - normal rate, regular rhythm, normal S1, S2, no murmurs, rubs, clicks or gallops Abdomen - soft, nontender, nondistended, no masses or organomegaly Lymph- no adenopathy palpable Ext-peripheral pulses normal, no pedal edema, no clubbing or cyanosis Skin-Warm and dry. no hyperpigmentation, vitiligo, or suspicious lesions Neuro -alert, oriented, normal speech, no focal findings or movement disorder noted Neck-normal C-spine, no tenderness, full ROM without pain Feet-no nail deformities or callus formation with good pulses noted Rt.knee-crepitations,medial joint line tenderness noted Results for orders placed or performed in visit on 08/27/18 CBC W/O DIFF Result Value Ref Range WBC 2.8 (L) 3.4 - 10.8 x10E3/uL  
 RBC 5.13 4.14 - 5.80 x10E6/uL HGB 13.5 13.0 - 17.7 g/dL HCT 43.4 37.5 - 51.0 % MCV 85 79 - 97 fL  
 MCH 26.3 (L) 26.6 - 33.0 pg  
 MCHC 31.1 (L) 31.5 - 35.7 g/dL  
 RDW 13.6 12.3 - 15.4 % PLATELET 892 755 - 165 x10E3/uL METABOLIC PANEL, COMPREHENSIVE Result Value Ref Range Glucose 90 65 - 99 mg/dL BUN 16 8 - 27 mg/dL Creatinine 1.18 0.76 - 1.27 mg/dL GFR est non-AA 62 >59 mL/min/1.73 GFR est AA 71 >59 mL/min/1.73  
 BUN/Creatinine ratio 14 10 - 24 Sodium 139 134 - 144 mmol/L Potassium 3.8 3.5 - 5.2 mmol/L Chloride 100 96 - 106 mmol/L  
 CO2 25 20 - 29 mmol/L Calcium 8.7 8.6 - 10.2 mg/dL Protein, total 7.0 6.0 - 8.5 g/dL Albumin 4.1 3.5 - 4.8 g/dL GLOBULIN, TOTAL 2.9 1.5 - 4.5 g/dL A-G Ratio 1.4 1.2 - 2.2 Bilirubin, total 0.9 0.0 - 1.2 mg/dL Alk. phosphatase 81 39 - 117 IU/L  
 AST (SGOT) 16 0 - 40 IU/L  
 ALT (SGPT) 8 0 - 44 IU/L  
AMB POC LIPID PROFILE Result Value Ref Range Cholesterol (POC) 220 Triglycerides (POC) 71 HDL Cholesterol (POC) 68  LDL Cholesterol (POC) 138 MG/DL  
 Non-HDL Goal (POC) 152 TChol/HDL Ratio (POC) 3.2 Assessment/Plan: ICD-10-CM ICD-9-CM 1. Primary osteoarthritis of right knee M17.11 715.16   
2. Encounter for immunization Z23 V03.89 INFLUENZA VIRUS VACCINE, HIGH DOSE SEASONAL, PRESERVATIVE FREE 3. Coronary artery disease involving native coronary artery of native heart without angina pectoris I25.10 414.01   
4. Primary osteoarthritis of right hip M16.11 715.15 Orders Placed This Encounter  irbesartan-hydroCHLOROthiazide (AVALIDE) 300-12.5 mg per tablet Sig: TK 1 T PO ONCE D Refill:  3  
 
lose weight, follow low fat diet, follow low salt diet,Take 81mg aspirin daily Patient Instructions [employer] Activation Thank you for requesting access to [facility]. Please follow the instructions below to securely access and download your online medical record. [facility] allows you to send messages to your doctor, view your test results, renew your prescriptions, schedule appointments, and more. How Do I Sign Up? 1. In your internet browser, go to www.[employer] 
2. Click on the First Time User? Click Here link in the Sign In box. You will be redirect to the New Member Sign Up page. 3. Enter your [facility] Access Code exactly as it appears below. You will not need to use this code after youve completed the sign-up process. If you do not sign up before the expiration date, you must request a new code. [facility] Access Code: [email or web address] Expires: 3/4/2019  9:13 AM (This is the date your [facility] access code will ) 4. Enter the last four digits of your Social Security Number (xxxx) and Date of Birth (mm/dd/yyyy) as indicated and click Submit. You will be taken to the next sign-up page. 5. Create a [facility] ID. This will be your [facility] login ID and cannot be changed, so think of one that is secure and easy to remember. 6. Create a [facility] password. You can change your password at any time. 7. Enter your Password Reset Question and Answer. This can be used at a later time if you forget your password. 8. Enter your e-mail address. You will receive e-mail notification when new information is available in [address] 9. Click Sign Up. You can now view and download portions of your medical record. 10. Click the Download Summary menu link to download a portable copy of your medical information. Additional Information If you have questions, please visit the Frequently Asked Questions section of the [employer] website at [email or web address] Remember, [employer] is NOT to be used for urgent needs. For medical emergencies, dial 911. Follow-up Disposition: 
Return in about 4 weeks (around 1/1/2019), or if symptoms worsen or fail to improve. I have reviewed with the patient details of the assessment and plan and all questions were answered. Relevent patient education was performed. The most recent lab findings were reviewed with the patient. An After Visit Summary was printed and given to the patient.

## 2018-12-04 NOTE — PROGRESS NOTES
1. Have you been to the ER, urgent care clinic since your last visit? Hospitalized since your last visit?no 2. Have you seen or consulted any other health care providers outside of the [facility] since your last visit? Include any pap smears or colon screening. No 
PHQ over the last two weeks 12/4/2018 PHQ Not Done - Little interest or pleasure in doing things Not at all Feeling down, depressed, irritable, or hopeless Not at all Total Score PHQ 2 0

## 2018-12-14 NOTE — TELEPHONE ENCOUNTER
----- Message from Hu Chawla sent at 12/13/2018 11:21 AM EST -----  Regarding: FW: Dr. Nelly Finn      ----- Message -----  From: Heidy Paul  Sent: 12/13/2018  10:40 AM  To: [facility] Office Pool  Subject: Dr. Nelly Finn                              Pt requesting a callback to discuss results of recent hip and knee x ray.  Best contact: [phone]

## 2018-12-17 NOTE — PATIENT INSTRUCTIONS
[employer] Activation    Thank you for requesting access to [facility]. Please follow the instructions below to securely access and download your online medical record. [employer] allows you to send messages to your doctor, view your test results, renew your prescriptions, schedule appointments, and more. How Do I Sign Up? 1. In your internet browser, go to [email or web address]  2. Click on the First Time User? Click Here link in the Sign In box. You will be redirect to the New Member Sign Up page. 3. Enter your [employer] Access Code exactly as it appears below. You will not need to use this code after youve completed the sign-up process. If you do not sign up before the expiration date, you must request a new code. [employer] Access Code: [email or web address]  Expires: 3/4/2019  9:13 AM (This is the date your [employer] access code will )    4. Enter the last four digits of your Social Security Number (xxxx) and Date of Birth (mm/dd/yyyy) as indicated and click Submit. You will be taken to the next sign-up page. 5. Create a [employer] ID. This will be your [employer] login ID and cannot be changed, so think of one that is secure and easy to remember. 6. Create a [employer] password. You can change your password at any time. 7. Enter your Password Reset Question and Answer. This can be used at a later time if you forget your password. 8. Enter your e-mail address. You will receive e-mail notification when new information is available in [address] 9. Click Sign Up. You can now view and download portions of your medical record. 10. Click the Download Summary menu link to download a portable copy of your medical information. Additional Information    If you have questions, please visit the Frequently Asked Questions section of the [employer] website at [email or web address] Remember, [employer] is NOT to be used for urgent needs. For medical emergencies, dial 911.

## 2018-12-17 NOTE — PROGRESS NOTES
1. Have you been to the ER, urgent care clinic since your last visit? Hospitalized since your last visit?no    2. Have you seen or consulted any other health care providers outside of the [address] [place] [address] since your last visit? Include any pap smears or colon screening.  No    PHQ over the last two weeks 12/4/2018   PHQ Not Done Medical Reason (indicate in comments)   Little interest or pleasure in doing things Not at all   Feeling down, depressed, irritable, or hopeless Not at all   Total Score PHQ 2 0     Chief Complaint   Patient presents with    Knee Pain     right    Injection

## 2018-12-17 NOTE — PROGRESS NOTES
Elizabeth Scott is a 68 y.o. male and presents with Knee Pain (right) and Injection  . Subjective:    Knee pain Review:  Patient complains of right knee pain. Onset of the symptoms was months  ago. Inciting event: longstanding problem which has been getting worse. Current symptoms include pain location: both: medial and swelling. none,  Aggravating symptoms: going up and down stairs, walking, lateral movements, any weight bearing. Patient's overall course: gradually worsening Patient has had prior knee problems. Evaluation to date: none. Treatment to date:NSAIDS        Hip Pain Review  Patient presents for evaluation of hip pains for a few day(s). Pain is located inright   hip,rated as a scale of 8/10  is described as aching, dull, and is intermittent . Associated symptoms include: decreased ROM. Related to injury:   no. The patient has tried NSAIDs for pain, with minimal relief. Coronary Disease Review:  Patient complains of no chest pain today. There has not been the need to use NTG. Previous cardiac testing has included: Electrocardiogram (EKG), Echocardiogram, CABG/Stent placement. Hypertension Review:  The patient has essential hypertension  Diet and Lifestyle: generally follows a  low sodium diet, exercises sporadically  Home BP Monitoring: is not measured at home. Pertinent ROS: taking medications as instructed, no medication side effects noted, no TIA's, no chest pain on exertion, no dyspnea on exertion, no swelling of ankles. Review of Systems  Constitutional: negative for fevers, chills, anorexia and weight loss  Eyes:   negative for visual disturbance and irritation  ENT:   negative for tinnitus,sore throat,nasal congestion,ear pains. hoarseness  Respiratory:  negative for cough, hemoptysis, dyspnea,wheezing  CV:   negative for chest pain, palpitations, lower extremity edema  GI:   negative for nausea, vomiting, diarrhea, abdominal pain,melena  Endo:               negative for polyuria,polydipsia,polyphagia,heat intolerance  Genitourinary: negative for frequency, dysuria and hematuria  Integument:  negative for rash and pruritus  Hematologic:  negative for easy bruising and gum/nose bleeding  Musculoskel:  joint pain  Neurological:  negative for headaches, dizziness, vertigo, memory problems and gait   Behavl/Psych: negative for feelings of anxiety, depression, mood changes    Past Medical History:   Diagnosis Date    Anxiety state, unspecified 8/27/2013    Arthritis     CAD (coronary artery disease)     Cataract cortical, senile 8/27/2013    Heart failure ([facility] [address]     MI 1993    Hypertension     Hypertensive retinopathy 8/27/2013    Observation for other specified suspected conditions 8/27/2013    Observation of [facility] Program Patient    Other ill-defined conditions(799.89)     chronic back pain    Other ill-defined conditions(799.89)     AGENT ORANGE EXPOSURE,     Psychiatric disorder     PTSD, NO MEDS    Stroke ([facility] [address]     TIA R HAND, RESOLVED    Unspecified adverse effect of anesthesia     SLOW TO WAKE, AGITATED WHEN HE WAKES    Unspecified reason for consultation 8/27/2013    Health maintenance     Past Surgical History:   Procedure Laterality Date    ABDOMEN SURGERY PROC UNLISTED      x2 hernia repairs    CARDIAC SURG PROCEDURE UNLIST      stent, cardiologist Dr. Opal Last HX ORTHOPAEDIC Right     ROTATOR CUFF X2     Social History     Socioeconomic History    Marital status: SINGLE     Spouse name: Not on file    Number of children: Not on file    Years of education: Not on file    Highest education level: Not on file   Tobacco Use    Smoking status: Former Smoker     Packs/day: 0.50     Years: 19.00     Pack years: 9.50    Smokeless tobacco: Former User     Quit date: 2/28/1984   Substance and Sexual Activity    Alcohol use: No    Drug use: No     Family History   Problem Relation Age of Onset    Cancer Mother     Heart Attack Mother     Cancer Father    [facility] Anesth Problems Neg Hx      Current Outpatient Medications   Medication Sig Dispense Refill    triamcinolone acetonide (KENALOG) 40 mg/mL injection 1 mL by Intra artICUlar route once for 1 dose. 1 mL 0    lidocaine, PF, (XYLOCAINE) 20 mg/mL (2 %) injection 1 mL by IntraVENous route once for 1 dose. 1 mL 0    irbesartan-hydroCHLOROthiazide (AVALIDE) 300-12.5 mg per tablet TK 1 T PO ONCE D  3    clotrimazole-betamethasone (LOTRISONE) topical cream APPLY TO AREA TWICE A  g 3    fluticasone (FLONASE) 50 mcg/actuation nasal spray USE 2 SPRAYS IN EACH NOSTRIL DAILY 48 g 3    atorvastatin (LIPITOR) 80 mg tablet Take 1 Tab by mouth daily. 30 Tab 12    aspirin delayed-release 81 mg tablet   2    COREG CR 40 mg CR capsule   5    clopidogrel (PLAVIX) 75 mg tablet   5    NITROSTAT 0.4 mg SL tablet   3    potassium chloride (K-DUR, KLOR-CON) 20 mEq tablet   5    RANEXA 500 mg SR tablet   5    valsartan-hydrochlorothiazide (DIOVAN-HCT) 320-25 mg per tablet   2    amLODIPine (NORVASC) 5 mg tablet Take 10 mg by mouth daily.  ezetimibe (ZETIA) 10 mg tablet Take  by mouth.        Allergies   Allergen Reactions    Darvocet A500 [Propoxyphene N-Acetaminophen] Other (comments)     floaty/high feeling       Objective:  Visit Vitals  BP (!) 146/100   Pulse 76   Temp 97.7 °F (36.5 °C) (Oral)   Resp 16   Ht 5' 9\" (1.753 m)   Wt 175 lb (79.4 kg)   SpO2 98%   BMI 25.84 kg/m²     Physical Exam:   General appearance - alert, well appearing, and in no distress  Mental status - alert, oriented to person, place, and time  EYE-JARED, EOMI, corneas normal, no foreign bodies  ENT-ENT exam normal, no neck nodes or sinus tenderness  Nose - normal and patent, no erythema, discharge or polyps  Mouth - mucous membranes moist, pharynx normal without lesions  Neck - supple, no significant adenopathy   Chest - clear to auscultation, no wheezes, rales or rhonchi, symmetric air entry   Heart - normal rate, regular rhythm, normal S1, S2, no murmurs, rubs, clicks or gallops   Abdomen - soft, nontender, nondistended, no masses or organomegaly  Lymph- no adenopathy palpable  Ext-peripheral pulses normal, no pedal edema, no clubbing or cyanosis  Skin-Warm and dry. no hyperpigmentation, vitiligo, or suspicious lesions  Neuro -alert, oriented, normal speech, no focal findings or movement disorder noted  Neck-normal C-spine, no tenderness, full ROM without pain  Feet-no nail deformities or callus formation with good pulses noted  Rt.knee-crepitations,medial joint line tenderness noted        Results for orders placed or performed in visit on 18   AMB POC LIPID PROFILE   Result Value Ref Range    Cholesterol (POC) 155     Triglycerides (POC) 51     HDL Cholesterol (POC) 73     LDL Cholesterol (POC) 71 MG/DL    Non-HDL Goal (POC) 81     TChol/HDL Ratio (POC) 2.1        Assessment/Plan:    ICD-10-CM ICD-9-CM    1. Primary osteoarthritis of right knee M17.11 715.16 TRIAMCINOLONE ACETONIDE INJ      LIDOCAINE INJECTION      AMB POC US, SONO GUIDE NEEDLE   2. Coronary artery disease involving native coronary artery of native heart without angina pectoris I25.10 414.01    3. Primary osteoarthritis of right hip M16.11 715.15    4. Hypercholesterolemia E78.00 272.0      Orders Placed This Encounter    AMB POC US, SONO GUIDE NEEDLE (70886)     Order Specific Question:   Reason for Exam     Answer:   pain    TRIAMCINOLONE ACETONIDE INJ     Order Specific Question:   Charge Quantity? Answer:   4    LIDOCAINE INJECTION    triamcinolone acetonide (KENALOG) 40 mg/mL injection     Si mL by Intra artICUlar route once for 1 dose. Dispense:  1 mL     Refill:  0    lidocaine, PF, (XYLOCAINE) 20 mg/mL (2 %) injection     Si mL by IntraVENous route once for 1 dose.      Dispense:  1 mL     Refill:  0     lose weight, follow low fat diet, follow low salt diet,Take 81mg aspirin daily    Indications:   Symptomatic relief of pain    Procedure:  After consent was obtained, using sterile technique the right knee joint was prepped using alcohol. Local anesthetic used: 1% lidocaine. . The joint was entered by ultrasound guiadance Kenalog 40 mg was mixed with 1% lidocaine 3 ml  and injected into the joint and the needle withdrawn. The procedure was well tolerated. The patient is asked to continue to rest the joint for a few more days before resuming regular activities. It may be more painful for the first 1-2 days. Watch for fever, or increased swelling or persistent pain in the joint. Call or return to clinic prn if such symptoms occur or there is failure to improve as anticipated. [facility]  OFFICE PROCEDURE PROGRESS NOTE        Chart reviewed for the following:   Alina Nguyen MD, have reviewed the History, Physical and updated the Allergic reactions for [address] performed immediately prior to start of procedure:   IAdrian MD, have performed the following reviews on Quillian Hives prior to the start of the procedure:            * Patient was identified by name and date of birth   * Agreement on procedure being performed was verified  * Risks and Benefits explained to the patient  * Procedure site verified and marked as necessary  * Patient was positioned for comfort       Time: 9:00am    Date of procedure: 12/17/2018    Procedure performed by:  Adrian Simental MD    Patient assisted by: nursing attendant    How tolerated by patient: tolerated the procedure well with no complications    Comments: none              Patient Instructions   MyChart Activation    Thank you for requesting access to [employer]. Please follow the instructions below to securely access and download your online medical record. [employer] allows you to send messages to your doctor, view your test results, renew your prescriptions, schedule appointments, and more. How Do I Sign Up? 1.  In your internet browser, go to [email or web address]  2. Click on the First Time User? Click Here link in the Sign In box. You will be redirect to the New Member Sign Up page. 3. Enter your UGAME Access Code exactly as it appears below. You will not need to use this code after youve completed the sign-up process. If you do not sign up before the expiration date, you must request a new code. UGAME Access Code: [email or web address]  Expires: 3/4/2019  9:13 AM (This is the date your UGAME access code will )    4. Enter the last four digits of your Social Security Number (xxxx) and Date of Birth (mm/dd/yyyy) as indicated and click Submit. You will be taken to the next sign-up page. 5. Create a Boket ID. This will be your UGAME login ID and cannot be changed, so think of one that is secure and easy to remember. 6. Create a UGAME password. You can change your password at any time. 7. Enter your Password Reset Question and Answer. This can be used at a later time if you forget your password. 8. Enter your e-mail address. You will receive e-mail notification when new information is available in [address] 9. Click Sign Up. You can now view and download portions of your medical record. 10. Click the Download Summary menu link to download a portable copy of your medical information. Additional Information    If you have questions, please visit the Frequently Asked Questions section of the UGAME website at [email or web address]. [email or web address] Remember, UGAME is NOT to be used for urgent needs. For medical emergencies, dial 911. Follow-up Disposition:  Return in about 4 weeks (around 2019), or if symptoms worsen or fail to improve. I have reviewed with the patient details of the assessment and plan and all questions were answered. Relevent patient education was performed. The most recent lab findings were reviewed with the patient.     An After Visit Summary was printed and given to the patient.

## 2019-01-01 ENCOUNTER — OFFICE VISIT (OUTPATIENT)
Dept: INTERNAL MEDICINE CLINIC | Age: 74
End: 2019-01-01

## 2019-01-01 ENCOUNTER — APPOINTMENT (OUTPATIENT)
Dept: CT IMAGING | Age: 74
DRG: 065 | End: 2019-01-01
Attending: EMERGENCY MEDICINE
Payer: MEDICARE

## 2019-01-01 ENCOUNTER — PATIENT OUTREACH (OUTPATIENT)
Dept: INTERNAL MEDICINE CLINIC | Age: 74
End: 2019-01-01

## 2019-01-01 ENCOUNTER — APPOINTMENT (OUTPATIENT)
Dept: GENERAL RADIOLOGY | Age: 74
End: 2019-01-01
Attending: EMERGENCY MEDICINE
Payer: MEDICARE

## 2019-01-01 ENCOUNTER — APPOINTMENT (OUTPATIENT)
Dept: GENERAL RADIOLOGY | Age: 74
DRG: 813 | End: 2019-01-01
Attending: EMERGENCY MEDICINE
Payer: MEDICARE

## 2019-01-01 ENCOUNTER — ANESTHESIA EVENT (OUTPATIENT)
Dept: ENDOSCOPY | Age: 74
DRG: 813 | End: 2019-01-01
Payer: MEDICARE

## 2019-01-01 ENCOUNTER — HOSPITAL ENCOUNTER (INPATIENT)
Age: 74
LOS: 1 days | Discharge: HOME | DRG: 813 | End: 2019-08-12
Attending: EMERGENCY MEDICINE | Admitting: HOSPITALIST
Payer: MEDICARE

## 2019-01-01 ENCOUNTER — HOSPITAL ENCOUNTER (INPATIENT)
Age: 74
LOS: 4 days | Discharge: HOME | DRG: 065 | End: 2019-05-21
Attending: EMERGENCY MEDICINE | Admitting: NEUROMUSCULOSKELETAL MEDICINE & OMM
Payer: MEDICARE

## 2019-01-01 ENCOUNTER — APPOINTMENT (OUTPATIENT)
Dept: NON INVASIVE DIAGNOSTICS | Age: 74
DRG: 065 | End: 2019-01-01
Attending: INTERNAL MEDICINE
Payer: MEDICARE

## 2019-01-01 ENCOUNTER — ANESTHESIA (OUTPATIENT)
Dept: ENDOSCOPY | Age: 74
DRG: 813 | End: 2019-01-01
Payer: MEDICARE

## 2019-01-01 ENCOUNTER — OFFICE VISIT (OUTPATIENT)
Dept: NEUROLOGY | Age: 74
End: 2019-01-01

## 2019-01-01 ENCOUNTER — HOSPITAL ENCOUNTER (OUTPATIENT)
Dept: ULTRASOUND IMAGING | Age: 74
Discharge: HOME | End: 2019-07-03
Attending: INTERNAL MEDICINE
Payer: MEDICARE

## 2019-01-01 ENCOUNTER — HOSPITAL ENCOUNTER (EMERGENCY)
Age: 74
Discharge: HOME | End: 2019-05-22
Attending: EMERGENCY MEDICINE
Payer: MEDICARE

## 2019-01-01 ENCOUNTER — APPOINTMENT (OUTPATIENT)
Dept: CT IMAGING | Age: 74
End: 2019-01-01
Attending: EMERGENCY MEDICINE
Payer: MEDICARE

## 2019-01-01 ENCOUNTER — HOSPITAL ENCOUNTER (EMERGENCY)
Age: 74
Discharge: HOME | End: 2019-09-15
Attending: EMERGENCY MEDICINE
Payer: MEDICARE

## 2019-01-01 ENCOUNTER — APPOINTMENT (OUTPATIENT)
Dept: MRI IMAGING | Age: 74
DRG: 065 | End: 2019-01-01
Attending: INTERNAL MEDICINE
Payer: MEDICARE

## 2019-01-01 VITALS
WEIGHT: 170 LBS | OXYGEN SATURATION: 98 % | SYSTOLIC BLOOD PRESSURE: 120 MMHG | RESPIRATION RATE: 16 BRPM | BODY MASS INDEX: 25.18 KG/M2 | TEMPERATURE: 98.1 F | HEART RATE: 94 BPM | HEIGHT: 69 IN | DIASTOLIC BLOOD PRESSURE: 70 MMHG

## 2019-01-01 VITALS
HEART RATE: 60 BPM | BODY MASS INDEX: 25.92 KG/M2 | SYSTOLIC BLOOD PRESSURE: 110 MMHG | RESPIRATION RATE: 17 BRPM | DIASTOLIC BLOOD PRESSURE: 70 MMHG | HEIGHT: 69 IN | WEIGHT: 175 LBS | OXYGEN SATURATION: 96 % | TEMPERATURE: 97.9 F

## 2019-01-01 VITALS
OXYGEN SATURATION: 98 % | BODY MASS INDEX: 25.18 KG/M2 | HEIGHT: 69 IN | DIASTOLIC BLOOD PRESSURE: 70 MMHG | RESPIRATION RATE: 19 BRPM | TEMPERATURE: 97.9 F | HEART RATE: 74 BPM | SYSTOLIC BLOOD PRESSURE: 120 MMHG | WEIGHT: 170 LBS

## 2019-01-01 VITALS
WEIGHT: 180.56 LBS | RESPIRATION RATE: 16 BRPM | TEMPERATURE: 98.2 F | OXYGEN SATURATION: 99 % | BODY MASS INDEX: 26.74 KG/M2 | HEIGHT: 69 IN | SYSTOLIC BLOOD PRESSURE: 97 MMHG | HEART RATE: 79 BPM | DIASTOLIC BLOOD PRESSURE: 55 MMHG

## 2019-01-01 VITALS
SYSTOLIC BLOOD PRESSURE: 120 MMHG | RESPIRATION RATE: 18 BRPM | DIASTOLIC BLOOD PRESSURE: 69 MMHG | OXYGEN SATURATION: 99 % | HEART RATE: 77 BPM | WEIGHT: 170 LBS | HEIGHT: 69 IN | TEMPERATURE: 98.1 F | BODY MASS INDEX: 25.18 KG/M2

## 2019-01-01 VITALS
DIASTOLIC BLOOD PRESSURE: 65 MMHG | HEIGHT: 69 IN | SYSTOLIC BLOOD PRESSURE: 112 MMHG | RESPIRATION RATE: 23 BRPM | TEMPERATURE: 98 F | OXYGEN SATURATION: 98 % | WEIGHT: 170 LBS | BODY MASS INDEX: 25.18 KG/M2 | HEART RATE: 79 BPM

## 2019-01-01 VITALS
RESPIRATION RATE: 20 BRPM | OXYGEN SATURATION: 97 % | HEART RATE: 71 BPM | BODY MASS INDEX: 26.51 KG/M2 | WEIGHT: 179 LBS | HEIGHT: 69 IN | TEMPERATURE: 98.2 F | SYSTOLIC BLOOD PRESSURE: 80 MMHG | DIASTOLIC BLOOD PRESSURE: 60 MMHG

## 2019-01-01 VITALS
BODY MASS INDEX: 25.77 KG/M2 | HEIGHT: 69 IN | DIASTOLIC BLOOD PRESSURE: 62 MMHG | SYSTOLIC BLOOD PRESSURE: 120 MMHG | HEART RATE: 56 BPM | WEIGHT: 174 LBS | OXYGEN SATURATION: 98 %

## 2019-01-01 VITALS
DIASTOLIC BLOOD PRESSURE: 70 MMHG | HEART RATE: 64 BPM | SYSTOLIC BLOOD PRESSURE: 116 MMHG | RESPIRATION RATE: 16 BRPM | BODY MASS INDEX: 25.18 KG/M2 | OXYGEN SATURATION: 99 % | HEIGHT: 69 IN | TEMPERATURE: 97.4 F | WEIGHT: 170 LBS

## 2019-01-01 VITALS
DIASTOLIC BLOOD PRESSURE: 70 MMHG | HEART RATE: 75 BPM | TEMPERATURE: 98.4 F | HEIGHT: 69 IN | RESPIRATION RATE: 16 BRPM | BODY MASS INDEX: 23.4 KG/M2 | SYSTOLIC BLOOD PRESSURE: 130 MMHG | WEIGHT: 158 LBS | OXYGEN SATURATION: 100 %

## 2019-01-01 VITALS
DIASTOLIC BLOOD PRESSURE: 78 MMHG | OXYGEN SATURATION: 95 % | TEMPERATURE: 97.7 F | SYSTOLIC BLOOD PRESSURE: 122 MMHG | HEIGHT: 69 IN | WEIGHT: 178.8 LBS | BODY MASS INDEX: 26.48 KG/M2 | HEART RATE: 69 BPM | RESPIRATION RATE: 16 BRPM

## 2019-01-01 VITALS
HEIGHT: 69 IN | DIASTOLIC BLOOD PRESSURE: 70 MMHG | SYSTOLIC BLOOD PRESSURE: 140 MMHG | BODY MASS INDEX: 25.77 KG/M2 | TEMPERATURE: 97.1 F | RESPIRATION RATE: 20 BRPM | HEART RATE: 81 BPM | WEIGHT: 174 LBS | OXYGEN SATURATION: 98 %

## 2019-01-01 DIAGNOSIS — I25.10 CORONARY ARTERY DISEASE INVOLVING NATIVE CORONARY ARTERY OF NATIVE HEART WITHOUT ANGINA PECTORIS: Primary | ICD-10-CM

## 2019-01-01 DIAGNOSIS — I63.9 NEW INFARCTION OF CEREBELLUM (HCC): ICD-10-CM

## 2019-01-01 DIAGNOSIS — R11.10 ACUTE VOMITING: Primary | ICD-10-CM

## 2019-01-01 DIAGNOSIS — Z86.73 HISTORY OF STROKE: ICD-10-CM

## 2019-01-01 DIAGNOSIS — I10 HTN (HYPERTENSION), BENIGN: ICD-10-CM

## 2019-01-01 DIAGNOSIS — E78.00 HYPERCHOLESTEROLEMIA: ICD-10-CM

## 2019-01-01 DIAGNOSIS — G45.9 TIA (TRANSIENT ISCHEMIC ATTACK): ICD-10-CM

## 2019-01-01 DIAGNOSIS — M17.11 PRIMARY OSTEOARTHRITIS OF RIGHT KNEE: ICD-10-CM

## 2019-01-01 DIAGNOSIS — I25.10 CAD (CORONARY ARTERY DISEASE): ICD-10-CM

## 2019-01-01 DIAGNOSIS — I63.9 NEW INFARCTION OF CEREBELLUM (HCC): Primary | ICD-10-CM

## 2019-01-01 DIAGNOSIS — E78.00 HYPERCHOLESTEREMIA: ICD-10-CM

## 2019-01-01 DIAGNOSIS — N39.0 URINARY TRACT INFECTION WITHOUT HEMATURIA, SITE UNSPECIFIED: ICD-10-CM

## 2019-01-01 DIAGNOSIS — Z13.6 ENCOUNTER FOR ABDOMINAL AORTIC ANEURYSM (AAA) SCREENING: ICD-10-CM

## 2019-01-01 DIAGNOSIS — I10 HTN (HYPERTENSION), BENIGN: Primary | ICD-10-CM

## 2019-01-01 DIAGNOSIS — I25.10 CORONARY ARTERY DISEASE INVOLVING NATIVE CORONARY ARTERY OF NATIVE HEART WITHOUT ANGINA PECTORIS: ICD-10-CM

## 2019-01-01 DIAGNOSIS — M16.11 PRIMARY OSTEOARTHRITIS OF RIGHT HIP: ICD-10-CM

## 2019-01-01 DIAGNOSIS — Z12.11 SCREENING FOR COLON CANCER: ICD-10-CM

## 2019-01-01 DIAGNOSIS — R42 VERTIGO: Primary | ICD-10-CM

## 2019-01-01 DIAGNOSIS — Z12.11 SCREEN FOR COLON CANCER: ICD-10-CM
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
## 2019-01-01 DIAGNOSIS — R55 NEAR SYNCOPE: ICD-10-CM

## 2019-01-01 DIAGNOSIS — K92.2 GASTROINTESTINAL HEMORRHAGE, UNSPECIFIED GASTROINTESTINAL HEMORRHAGE TYPE: Primary | ICD-10-CM

## 2019-01-01 DIAGNOSIS — R42 POSTURAL DIZZINESS: Primary | ICD-10-CM

## 2019-01-01 DIAGNOSIS — Z12.5 PROSTATE CANCER SCREENING: ICD-10-CM

## 2019-01-01 DIAGNOSIS — I63.9 CEREBROVASCULAR ACCIDENT (CVA), UNSPECIFIED MECHANISM (HCC): Primary | ICD-10-CM

## 2019-01-01 DIAGNOSIS — E78.2 MIXED HYPERLIPIDEMIA: ICD-10-CM

## 2019-01-01 DIAGNOSIS — R42 POSTURAL DIZZINESS: ICD-10-CM

## 2019-01-01 DIAGNOSIS — R11.2 NAUSEA AND VOMITING IN ADULT: ICD-10-CM

## 2019-01-01 DIAGNOSIS — K25.0 ACUTE GASTRIC ULCER WITH HEMORRHAGE: Primary | ICD-10-CM

## 2019-01-01 DIAGNOSIS — M17.11 PRIMARY OSTEOARTHRITIS OF RIGHT KNEE: Primary | ICD-10-CM

## 2019-01-01 DIAGNOSIS — R35.0 FREQUENCY OF URINATION: ICD-10-CM

## 2019-01-01 DIAGNOSIS — N40.0 BENIGN PROSTATE HYPERPLASIA: ICD-10-CM

## 2019-01-01 DIAGNOSIS — D50.0 IRON DEFICIENCY ANEMIA DUE TO CHRONIC BLOOD LOSS: ICD-10-CM

## 2019-01-01 DIAGNOSIS — E86.0 DEHYDRATION: ICD-10-CM

## 2019-01-01 DIAGNOSIS — D64.9 ANEMIA: ICD-10-CM

## 2019-01-01 DIAGNOSIS — I10 ESSENTIAL HYPERTENSION: ICD-10-CM

## 2019-01-01 LAB
ABO + RH BLD: NORMAL
ALBUMIN SERPL-MCNC: 3.1 G/DL (ref 3.5–5)
ALBUMIN SERPL-MCNC: 3.2 G/DL (ref 3.5–5)
ALBUMIN SERPL-MCNC: 3.2 G/DL (ref 3.5–5)
ALBUMIN SERPL-MCNC: 3.3 G/DL (ref 3.5–5)
ALBUMIN SERPL-MCNC: 3.6 G/DL (ref 3.5–5)
ALBUMIN SERPL-MCNC: 3.6 G/DL (ref 3.5–5)
ALBUMIN/GLOB SERPL: 0.8 {RATIO} (ref 1.1–2.2)
ALBUMIN/GLOB SERPL: 0.9 {RATIO} (ref 1.1–2.2)
ALBUMIN/GLOB SERPL: 0.9 {RATIO} (ref 1.1–2.2)
ALBUMIN/GLOB SERPL: 1 {RATIO} (ref 1.1–2.2)
ALBUMIN/GLOB SERPL: 1.1 {RATIO} (ref 1.1–2.2)
ALBUMIN/GLOB SERPL: 1.1 {RATIO} (ref 1.1–2.2)
ALP SERPL-CCNC: 68 U/L (ref 45–117)
ALP SERPL-CCNC: 70 U/L (ref 45–117)
ALP SERPL-CCNC: 73 U/L (ref 45–117)
ALP SERPL-CCNC: 74 U/L (ref 45–117)
ALP SERPL-CCNC: 76 U/L (ref 45–117)
ALP SERPL-CCNC: 81 U/L (ref 45–117)
ALT SERPL-CCNC: 12 U/L (ref 12–78)
ALT SERPL-CCNC: 13 U/L (ref 12–78)
ALT SERPL-CCNC: 14 U/L (ref 12–78)
ALT SERPL-CCNC: 16 U/L (ref 12–78)
ALT SERPL-CCNC: 20 U/L (ref 12–78)
ALT SERPL-CCNC: 97 U/L (ref 12–78)
ANION GAP SERPL CALC-SCNC: 3 MMOL/L (ref 5–15)
ANION GAP SERPL CALC-SCNC: 5 MMOL/L (ref 5–15)
ANION GAP SERPL CALC-SCNC: 6 MMOL/L (ref 5–15)
ANION GAP SERPL CALC-SCNC: 6 MMOL/L (ref 5–15)
ANION GAP SERPL CALC-SCNC: 7 MMOL/L (ref 5–15)
APPEARANCE UR: CLEAR
AST SERPL-CCNC: 12 U/L (ref 15–37)
AST SERPL-CCNC: 124 U/L (ref 15–37)
AST SERPL-CCNC: 13 U/L (ref 15–37)
AST SERPL-CCNC: 16 U/L (ref 15–37)
AST SERPL-CCNC: 18 U/L (ref 15–37)
AST SERPL-CCNC: 18 U/L (ref 15–37)
ATRIAL RATE: 59 BPM
ATRIAL RATE: 73 BPM
ATRIAL RATE: 76 BPM
ATRIAL RATE: 81 BPM
BACTERIA SPEC CULT: ABNORMAL
BACTERIA URNS QL MICRO: ABNORMAL /HPF
BASOPHILS # BLD: 0 K/UL (ref 0–0.1)
BASOPHILS # BLD: 0.1 K/UL (ref 0–0.1)
BASOPHILS # BLD: 0.1 K/UL (ref 0–0.1)
BASOPHILS NFR BLD: 1 % (ref 0–1)
BILIRUB DIRECT SERPL-MCNC: 0.3 MG/DL (ref 0–0.2)
BILIRUB SERPL-MCNC: 0.7 MG/DL (ref 0.2–1)
BILIRUB SERPL-MCNC: 0.8 MG/DL (ref 0.2–1)
BILIRUB SERPL-MCNC: 0.9 MG/DL (ref 0.2–1)
BILIRUB SERPL-MCNC: 1.1 MG/DL (ref 0.2–1)
BILIRUB SERPL-MCNC: 1.3 MG/DL (ref 0.2–1)
BILIRUB SERPL-MCNC: 1.5 MG/DL (ref 0.2–1)
BILIRUB UR QL: NEGATIVE
BLOOD GROUP ANTIBODIES SERPL: NORMAL
BNP SERPL-MCNC: 95 PG/ML
BUN SERPL-MCNC: 10 MG/DL (ref 6–20)
BUN SERPL-MCNC: 14 MG/DL (ref 6–20)
BUN SERPL-MCNC: 20 MG/DL (ref 6–20)
BUN SERPL-MCNC: 20 MG/DL (ref 6–20)
BUN SERPL-MCNC: 22 MG/DL (ref 6–20)
BUN SERPL-MCNC: 23 MG/DL (ref 6–20)
BUN SERPL-MCNC: 24 MG/DL (ref 6–20)
BUN SERPL-MCNC: 9 MG/DL (ref 6–20)
BUN/CREAT SERPL: 10 (ref 12–20)
BUN/CREAT SERPL: 14 (ref 12–20)
BUN/CREAT SERPL: 16 (ref 12–20)
BUN/CREAT SERPL: 17 (ref 12–20)
BUN/CREAT SERPL: 19 (ref 12–20)
BUN/CREAT SERPL: 9 (ref 12–20)
CALCIUM SERPL-MCNC: 7.8 MG/DL (ref 8.5–10.1)
CALCIUM SERPL-MCNC: 7.9 MG/DL (ref 8.5–10.1)
CALCIUM SERPL-MCNC: 7.9 MG/DL (ref 8.5–10.1)
CALCIUM SERPL-MCNC: 8.1 MG/DL (ref 8.5–10.1)
CALCIUM SERPL-MCNC: 8.2 MG/DL (ref 8.5–10.1)
CALCIUM SERPL-MCNC: 8.2 MG/DL (ref 8.5–10.1)
CALCIUM SERPL-MCNC: 8.4 MG/DL (ref 8.5–10.1)
CALCIUM SERPL-MCNC: 8.5 MG/DL (ref 8.5–10.1)
CALCULATED P AXIS, ECG09: 43 DEGREES
CALCULATED P AXIS, ECG09: 56 DEGREES
CALCULATED P AXIS, ECG09: 58 DEGREES
CALCULATED P AXIS, ECG09: 65 DEGREES
CALCULATED R AXIS, ECG10: -13 DEGREES
CALCULATED R AXIS, ECG10: -18 DEGREES
CALCULATED R AXIS, ECG10: -23 DEGREES
CALCULATED R AXIS, ECG10: -5 DEGREES
CALCULATED T AXIS, ECG11: 41 DEGREES
CALCULATED T AXIS, ECG11: 43 DEGREES
CALCULATED T AXIS, ECG11: 63 DEGREES
CALCULATED T AXIS, ECG11: 69 DEGREES
CC UR VC: ABNORMAL
CHLORIDE SERPL-SCNC: 102 MMOL/L (ref 97–108)
CHLORIDE SERPL-SCNC: 105 MMOL/L (ref 97–108)
CHLORIDE SERPL-SCNC: 105 MMOL/L (ref 97–108)
CHLORIDE SERPL-SCNC: 106 MMOL/L (ref 97–108)
CHLORIDE SERPL-SCNC: 108 MMOL/L (ref 97–108)
CHLORIDE SERPL-SCNC: 109 MMOL/L (ref 97–108)
CHOLEST SERPL-MCNC: 125 MG/DL
CHOLEST SERPL-MCNC: 219 MG/DL
CK SERPL-CCNC: 125 U/L (ref 39–308)
CK SERPL-CCNC: 69 U/L (ref 39–308)
CK SERPL-CCNC: 89 U/L (ref 39–308)
CO2 SERPL-SCNC: 26 MMOL/L (ref 21–32)
CO2 SERPL-SCNC: 27 MMOL/L (ref 21–32)
CO2 SERPL-SCNC: 27 MMOL/L (ref 21–32)
CO2 SERPL-SCNC: 28 MMOL/L (ref 21–32)
CO2 SERPL-SCNC: 28 MMOL/L (ref 21–32)
CO2 SERPL-SCNC: 29 MMOL/L (ref 21–32)
CO2 SERPL-SCNC: 29 MMOL/L (ref 21–32)
CO2 SERPL-SCNC: 32 MMOL/L (ref 21–32)
COLOR UR: ABNORMAL
CREAT SERPL-MCNC: 1 MG/DL (ref 0.7–1.3)
CREAT SERPL-MCNC: 1.02 MG/DL (ref 0.7–1.3)
CREAT SERPL-MCNC: 1.04 MG/DL (ref 0.7–1.3)
CREAT SERPL-MCNC: 1.08 MG/DL (ref 0.7–1.3)
CREAT SERPL-MCNC: 1.26 MG/DL (ref 0.7–1.3)
CREAT SERPL-MCNC: 1.31 MG/DL (ref 0.7–1.3)
CREAT SERPL-MCNC: 1.46 MG/DL (ref 0.7–1.3)
CREAT SERPL-MCNC: 1.5 MG/DL (ref 0.7–1.3)
DIAGNOSIS, 93000: NORMAL
DIFFERENTIAL METHOD BLD: ABNORMAL
DIFFERENTIAL METHOD BLD: NORMAL
ECHO AO ROOT DIAM: 3.27 CM
ECHO AV AREA PEAK VELOCITY: 2.6 CM2
ECHO AV AREA VTI: 3.2 CM2
ECHO AV AREA/BSA PEAK VELOCITY: 1.4 CM2/M2
ECHO AV AREA/BSA VTI: 1.7 CM2/M2
ECHO AV MEAN GRADIENT: 2.3 MMHG
ECHO AV PEAK GRADIENT: 4.3 MMHG
ECHO AV PEAK VELOCITY: 103.6 CM/S
ECHO AV VTI: 19.84 CM
ECHO EST RA PRESSURE: 10 MMHG
ECHO LV INTERNAL DIMENSION DIASTOLIC: 5.32 CM (ref 4.2–5.9)
ECHO LV INTERNAL DIMENSION SYSTOLIC: 3.64 CM
ECHO LV IVSD: 1.13 CM (ref 0.6–1)
ECHO LV MASS 2D: 248.9 G (ref 88–224)
ECHO LV MASS INDEX 2D: 133.2 G/M2 (ref 49–115)
ECHO LV POSTERIOR WALL DIASTOLIC: 0.94 CM (ref 0.6–1)
ECHO LVOT DIAM: 2.14 CM
ECHO LVOT PEAK GRADIENT: 2.1 MMHG
ECHO LVOT PEAK VELOCITY: 73.21 CM/S
ECHO LVOT SV: 63.1 ML
ECHO LVOT VTI: 17.47 CM
ECHO MV A VELOCITY: 123.42 CM/S
ECHO MV AREA PHT: 4.2 CM2
ECHO MV AREA VTI: 2.2 CM2
ECHO MV E DECELERATION TIME (DT): 168.5 MS
ECHO MV E POINT SEPTAL SEPARATION (EPSS): 11.2 CM
ECHO MV E VELOCITY: 82.93 CM/S
ECHO MV E/A RATIO: 0.67
ECHO MV MAX VELOCITY: 136.81 CM/S
ECHO MV MEAN GRADIENT: 2.7 MMHG
ECHO MV PEAK GRADIENT: 7.5 MMHG
ECHO MV PRESSURE HALF TIME (PHT): 52.1 MS
ECHO MV REGURGITANT PEAK GRADIENT: 50.9 MMHG
ECHO MV REGURGITANT PEAK VELOCITY: 356.58 CM/S
ECHO MV VTI: 28.49 CM
ECHO PULMONARY ARTERY SYSTOLIC PRESSURE (PASP): 29.7 MMHG
ECHO PV MAX VELOCITY: 73.53 CM/S
ECHO PV PEAK GRADIENT: 2.2 MMHG
ECHO RIGHT VENTRICULAR SYSTOLIC PRESSURE (RVSP): 29.7 MMHG
ECHO TV REGURGITANT MAX VELOCITY: 222.14 CM/S
ECHO TV REGURGITANT PEAK GRADIENT: 19.7 MMHG
EOSINOPHIL # BLD: 0 K/UL (ref 0–0.4)
EOSINOPHIL # BLD: 0.1 K/UL (ref 0–0.4)
EOSINOPHIL # BLD: 0.2 K/UL (ref 0–0.4)
EOSINOPHIL NFR BLD: 0 % (ref 0–7)
EOSINOPHIL NFR BLD: 1 % (ref 0–7)
EOSINOPHIL NFR BLD: 2 % (ref 0–7)
EOSINOPHIL NFR BLD: 2 % (ref 0–7)
EOSINOPHIL NFR BLD: 3 % (ref 0–7)
EPITH CASTS URNS QL MICRO: ABNORMAL /LPF
ERYTHROCYTE [DISTWIDTH] IN BLOOD BY AUTOMATED COUNT: 12.5 % (ref 11.5–14.5)
ERYTHROCYTE [DISTWIDTH] IN BLOOD BY AUTOMATED COUNT: 12.5 % (ref 11.5–14.5)
ERYTHROCYTE [DISTWIDTH] IN BLOOD BY AUTOMATED COUNT: 12.7 % (ref 11.5–14.5)
ERYTHROCYTE [DISTWIDTH] IN BLOOD BY AUTOMATED COUNT: 12.7 % (ref 11.5–14.5)
ERYTHROCYTE [DISTWIDTH] IN BLOOD BY AUTOMATED COUNT: 12.8 % (ref 11.5–14.5)
ERYTHROCYTE [DISTWIDTH] IN BLOOD BY AUTOMATED COUNT: 13.2 % (ref 11.5–14.5)
ERYTHROCYTE [DISTWIDTH] IN BLOOD BY AUTOMATED COUNT: 13.5 % (ref 11.5–14.5)
ERYTHROCYTE [DISTWIDTH] IN BLOOD BY AUTOMATED COUNT: 15.1 % (ref 11.5–14.5)
EST. AVERAGE GLUCOSE BLD GHB EST-MCNC: 111 MG/DL
GLOBULIN SER CALC-MCNC: 3.3 G/DL (ref 2–4)
GLOBULIN SER CALC-MCNC: 3.3 G/DL (ref 2–4)
GLOBULIN SER CALC-MCNC: 3.4 G/DL (ref 2–4)
GLOBULIN SER CALC-MCNC: 3.6 G/DL (ref 2–4)
GLOBULIN SER CALC-MCNC: 3.6 G/DL (ref 2–4)
GLOBULIN SER CALC-MCNC: 3.8 G/DL (ref 2–4)
GLUCOSE BLD STRIP.AUTO-MCNC: 70 MG/DL (ref 65–100)
GLUCOSE BLD STRIP.AUTO-MCNC: 80 MG/DL (ref 65–100)
GLUCOSE SERPL-MCNC: 101 MG/DL (ref 65–100)
GLUCOSE SERPL-MCNC: 103 MG/DL (ref 65–100)
GLUCOSE SERPL-MCNC: 106 MG/DL (ref 65–100)
GLUCOSE SERPL-MCNC: 113 MG/DL (ref 65–100)
GLUCOSE SERPL-MCNC: 116 MG/DL (ref 65–100)
GLUCOSE SERPL-MCNC: 117 MG/DL (ref 65–100)
GLUCOSE SERPL-MCNC: 118 MG/DL (ref 65–100)
GLUCOSE SERPL-MCNC: 94 MG/DL (ref 65–100)
GLUCOSE UR STRIP.AUTO-MCNC: NEGATIVE MG/DL
H PYLORI FROM TISSUE: POSITIVE
HBA1C MFR BLD: 5.5 % (ref 4.2–6.3)
HCT VFR BLD AUTO: 27.8 % (ref 36.6–50.3)
HCT VFR BLD AUTO: 28 % (ref 36.6–50.3)
HCT VFR BLD AUTO: 30.1 % (ref 36.6–50.3)
HCT VFR BLD AUTO: 32.2 % (ref 36.6–50.3)
HCT VFR BLD AUTO: 40.2 % (ref 36.6–50.3)
HCT VFR BLD AUTO: 41.3 % (ref 36.6–50.3)
HCT VFR BLD AUTO: 41.4 % (ref 36.6–50.3)
HCT VFR BLD AUTO: 41.5 % (ref 36.6–50.3)
HCT VFR BLD AUTO: 44.4 % (ref 36.6–50.3)
HDLC SERPL-MCNC: 51 MG/DL
HDLC SERPL-MCNC: 63 MG/DL
HDLC SERPL: 2.5 {RATIO} (ref 0–5)
HEMOCCULT STL QL: POSITIVE
HGB BLD-MCNC: 12.5 G/DL (ref 12.1–17)
HGB BLD-MCNC: 12.7 G/DL (ref 12.1–17)
HGB BLD-MCNC: 12.9 G/DL (ref 12.1–17)
HGB BLD-MCNC: 13.3 G/DL (ref 12.1–17)
HGB BLD-MCNC: 14 G/DL (ref 12.1–17)
HGB BLD-MCNC: 8.5 G/DL (ref 12.1–17)
HGB BLD-MCNC: 8.7 G/DL (ref 12.1–17)
HGB BLD-MCNC: 9.3 G/DL (ref 12.1–17)
HGB BLD-MCNC: 9.6 G/DL (ref 12.1–17)
HGB UR QL STRIP: NEGATIVE
HYALINE CASTS URNS QL MICRO: ABNORMAL /LPF (ref 0–5)
IMM GRANULOCYTES # BLD AUTO: 0 K/UL (ref 0–0.04)
IMM GRANULOCYTES NFR BLD AUTO: 0 % (ref 0–0.5)
INR PPP: 1.1 (ref 0.9–1.1)
KETONES UR QL STRIP.AUTO: NEGATIVE MG/DL
KIT LOT NO., HCLOLOT: NORMAL
LDL CHOLESTEROL POC: 138 MG/DL
LDLC SERPL CALC-MCNC: 55.6 MG/DL (ref 0–100)
LEUKOCYTE ESTERASE UR QL STRIP.AUTO: ABNORMAL
LIPASE SERPL-CCNC: 84 U/L (ref 73–393)
LIPID PROFILE,FLP: NORMAL
LYMPHOCYTES # BLD: 0.6 K/UL (ref 0.8–3.5)
LYMPHOCYTES # BLD: 1 K/UL (ref 0.8–3.5)
LYMPHOCYTES # BLD: 1.2 K/UL (ref 0.8–3.5)
LYMPHOCYTES # BLD: 1.3 K/UL (ref 0.8–3.5)
LYMPHOCYTES # BLD: 1.7 K/UL (ref 0.8–3.5)
LYMPHOCYTES NFR BLD: 12 % (ref 12–49)
LYMPHOCYTES NFR BLD: 16 % (ref 12–49)
LYMPHOCYTES NFR BLD: 22 % (ref 12–49)
LYMPHOCYTES NFR BLD: 33 % (ref 12–49)
LYMPHOCYTES NFR BLD: 36 % (ref 12–49)
MAGNESIUM SERPL-MCNC: 2.1 MG/DL (ref 1.6–2.4)
MAGNESIUM SERPL-MCNC: 2.1 MG/DL (ref 1.6–2.4)
MCH RBC QN AUTO: 23.5 PG (ref 26–34)
MCH RBC QN AUTO: 25.8 PG (ref 26–34)
MCH RBC QN AUTO: 26.3 PG (ref 26–34)
MCH RBC QN AUTO: 26.3 PG (ref 26–34)
MCH RBC QN AUTO: 26.4 PG (ref 26–34)
MCH RBC QN AUTO: 26.7 PG (ref 26–34)
MCH RBC QN AUTO: 26.7 PG (ref 26–34)
MCH RBC QN AUTO: 26.9 PG (ref 26–34)
MCHC RBC AUTO-ENTMCNC: 29.8 G/DL (ref 30–36.5)
MCHC RBC AUTO-ENTMCNC: 30.8 G/DL (ref 30–36.5)
MCHC RBC AUTO-ENTMCNC: 30.9 G/DL (ref 30–36.5)
MCHC RBC AUTO-ENTMCNC: 31.1 G/DL (ref 30–36.5)
MCHC RBC AUTO-ENTMCNC: 31.2 G/DL (ref 30–36.5)
MCHC RBC AUTO-ENTMCNC: 31.3 G/DL (ref 30–36.5)
MCHC RBC AUTO-ENTMCNC: 31.5 G/DL (ref 30–36.5)
MCHC RBC AUTO-ENTMCNC: 32 G/DL (ref 30–36.5)
MCV RBC AUTO: 78.7 FL (ref 80–99)
MCV RBC AUTO: 81.9 FL (ref 80–99)
MCV RBC AUTO: 83.2 FL (ref 80–99)
MCV RBC AUTO: 84.5 FL (ref 80–99)
MCV RBC AUTO: 84.8 FL (ref 80–99)
MCV RBC AUTO: 85.3 FL (ref 80–99)
MCV RBC AUTO: 85.7 FL (ref 80–99)
MCV RBC AUTO: 87 FL (ref 80–99)
MONOCYTES # BLD: 0.4 K/UL (ref 0–1)
MONOCYTES # BLD: 0.5 K/UL (ref 0–1)
MONOCYTES # BLD: 0.6 K/UL (ref 0–1)
MONOCYTES NFR BLD: 11 % (ref 5–13)
MONOCYTES NFR BLD: 11 % (ref 5–13)
MONOCYTES NFR BLD: 12 % (ref 5–13)
MONOCYTES NFR BLD: 9 % (ref 5–13)
MONOCYTES NFR BLD: 9 % (ref 5–13)
MUCOUS THREADS URNS QL MICRO: ABNORMAL /LPF
NEGATIVE CONTROL: NEGATIVE
NEUTS SEG # BLD: 1.7 K/UL (ref 1.8–8)
NEUTS SEG # BLD: 2.7 K/UL (ref 1.8–8)
NEUTS SEG # BLD: 3.7 K/UL (ref 1.8–8)
NEUTS SEG # BLD: 4.1 K/UL (ref 1.8–8)
NEUTS SEG # BLD: 4.4 K/UL (ref 1.8–8)
NEUTS SEG NFR BLD: 49 % (ref 32–75)
NEUTS SEG NFR BLD: 53 % (ref 32–75)
NEUTS SEG NFR BLD: 63 % (ref 32–75)
NEUTS SEG NFR BLD: 73 % (ref 32–75)
NEUTS SEG NFR BLD: 78 % (ref 32–75)
NITRITE UR QL STRIP.AUTO: NEGATIVE
NON-HDL GOAL (POC): 156
NRBC # BLD: 0 K/UL (ref 0–0.01)
NRBC BLD-RTO: 0 PER 100 WBC
P-R INTERVAL, ECG05: 146 MS
P-R INTERVAL, ECG05: 170 MS
P-R INTERVAL, ECG05: 182 MS
P-R INTERVAL, ECG05: 194 MS
PH UR STRIP: 6 [PH] (ref 5–8)
PHOSPHATE SERPL-MCNC: 2.7 MG/DL (ref 2.6–4.7)
PLATELET # BLD AUTO: 173 K/UL (ref 150–400)
PLATELET # BLD AUTO: 182 K/UL (ref 150–400)
PLATELET # BLD AUTO: 187 K/UL (ref 150–400)
PLATELET # BLD AUTO: 193 K/UL (ref 150–400)
PLATELET # BLD AUTO: 214 K/UL (ref 150–400)
PLATELET # BLD AUTO: 221 K/UL (ref 150–400)
PLATELET # BLD AUTO: 224 K/UL (ref 150–400)
PLATELET # BLD AUTO: 292 K/UL (ref 150–400)
PMV BLD AUTO: 10.1 FL (ref 8.9–12.9)
PMV BLD AUTO: 9.3 FL (ref 8.9–12.9)
PMV BLD AUTO: 9.5 FL (ref 8.9–12.9)
PMV BLD AUTO: 9.6 FL (ref 8.9–12.9)
PMV BLD AUTO: 9.6 FL (ref 8.9–12.9)
PMV BLD AUTO: 9.7 FL (ref 8.9–12.9)
POSITIVE CONTROL: POSITIVE
POTASSIUM SERPL-SCNC: 3.3 MMOL/L (ref 3.5–5.1)
POTASSIUM SERPL-SCNC: 3.5 MMOL/L (ref 3.5–5.1)
POTASSIUM SERPL-SCNC: 3.6 MMOL/L (ref 3.5–5.1)
POTASSIUM SERPL-SCNC: 3.6 MMOL/L (ref 3.5–5.1)
POTASSIUM SERPL-SCNC: 3.7 MMOL/L (ref 3.5–5.1)
POTASSIUM SERPL-SCNC: 3.8 MMOL/L (ref 3.5–5.1)
PROCALCITONIN SERPL-MCNC: 0.1 NG/ML
PROT SERPL-MCNC: 6.6 G/DL (ref 6.4–8.2)
PROT SERPL-MCNC: 6.7 G/DL (ref 6.4–8.2)
PROT SERPL-MCNC: 6.8 G/DL (ref 6.4–8.2)
PROT SERPL-MCNC: 6.9 G/DL (ref 6.4–8.2)
PROT SERPL-MCNC: 7 G/DL (ref 6.4–8.2)
PROT SERPL-MCNC: 7 G/DL (ref 6.4–8.2)
PROT UR STRIP-MCNC: NEGATIVE MG/DL
PROTHROMBIN TIME: 10.9 SEC (ref 9–11.1)
PSA SERPL-MCNC: 3.7 NG/ML (ref 0–4)
Q-T INTERVAL, ECG07: 392 MS
Q-T INTERVAL, ECG07: 404 MS
Q-T INTERVAL, ECG07: 408 MS
Q-T INTERVAL, ECG07: 466 MS
QRS DURATION, ECG06: 76 MS
QRS DURATION, ECG06: 84 MS
QTC CALCULATION (BEZET), ECG08: 449 MS
QTC CALCULATION (BEZET), ECG08: 454 MS
QTC CALCULATION (BEZET), ECG08: 455 MS
QTC CALCULATION (BEZET), ECG08: 461 MS
RBC # BLD AUTO: 3.26 M/UL (ref 4.1–5.7)
RBC # BLD AUTO: 3.46 M/UL (ref 4.1–5.7)
RBC # BLD AUTO: 4.09 M/UL (ref 4.1–5.7)
RBC # BLD AUTO: 4.74 M/UL (ref 4.1–5.7)
RBC # BLD AUTO: 4.82 M/UL (ref 4.1–5.7)
RBC # BLD AUTO: 4.9 M/UL (ref 4.1–5.7)
RBC # BLD AUTO: 4.99 M/UL (ref 4.1–5.7)
RBC # BLD AUTO: 5.42 M/UL (ref 4.1–5.7)
RBC #/AREA URNS HPF: ABNORMAL /HPF (ref 0–5)
RBC MORPH BLD: ABNORMAL
SERVICE CMNT-IMP: ABNORMAL
SERVICE CMNT-IMP: NORMAL
SERVICE CMNT-IMP: NORMAL
SODIUM SERPL-SCNC: 136 MMOL/L (ref 136–145)
SODIUM SERPL-SCNC: 139 MMOL/L (ref 136–145)
SODIUM SERPL-SCNC: 140 MMOL/L (ref 136–145)
SODIUM SERPL-SCNC: 141 MMOL/L (ref 136–145)
SODIUM SERPL-SCNC: 142 MMOL/L (ref 136–145)
SODIUM SERPL-SCNC: 143 MMOL/L (ref 136–145)
SP GR UR REFRACTOMETRY: 1.02 (ref 1–1.03)
SPECIMEN EXP DATE BLD: NORMAL
T4 FREE SERPL-MCNC: 1.1 NG/DL (ref 0.8–1.5)
T4 FREE SERPL-MCNC: 1.2 NG/DL (ref 0.8–1.5)
TCHOL/HDL RATIO (POC): 3.5
TRIGL SERPL-MCNC: 90 MG/DL
TRIGL SERPL-MCNC: 92 MG/DL (ref ?–150)
TROPONIN I SERPL-MCNC: <0.05 NG/ML
TSH SERPL DL<=0.05 MIU/L-ACNC: 0.15 UIU/ML (ref 0.36–3.74)
TSH SERPL DL<=0.05 MIU/L-ACNC: 1.08 UIU/ML (ref 0.36–3.74)
UA: UC IF INDICATED,UAUC: ABNORMAL
UROBILINOGEN UR QL STRIP.AUTO: 1 EU/DL (ref 0.2–1)
VENTRICULAR RATE, ECG03: 59 BPM
VENTRICULAR RATE, ECG03: 73 BPM
VENTRICULAR RATE, ECG03: 76 BPM
VENTRICULAR RATE, ECG03: 81 BPM
VLDLC SERPL CALC-MCNC: 18.4 MG/DL
WBC # BLD AUTO: 3.4 K/UL (ref 4.1–11.1)
WBC # BLD AUTO: 3.8 K/UL (ref 4.1–11.1)
WBC # BLD AUTO: 4.8 K/UL (ref 4.1–11.1)
WBC # BLD AUTO: 5.2 K/UL (ref 4.1–11.1)
WBC # BLD AUTO: 5.3 K/UL (ref 4.1–11.1)
WBC # BLD AUTO: 5.4 K/UL (ref 4.1–11.1)
WBC # BLD AUTO: 5.8 K/UL (ref 4.1–11.1)
WBC # BLD AUTO: 6.1 K/UL (ref 4.1–11.1)
WBC MORPH BLD: ABNORMAL
WBC URNS QL MICRO: ABNORMAL /HPF (ref 0–4)

## 2019-01-01 PROCEDURE — 97166 OT EVAL MOD COMPLEX 45 MIN: CPT | Performed by: OCCUPATIONAL THERAPIST

## 2019-01-01 PROCEDURE — 74011250636 HC RX REV CODE- 250/636: Performed by: INTERNAL MEDICINE

## 2019-01-01 PROCEDURE — 74011250637 HC RX REV CODE- 250/637: Performed by: NEUROMUSCULOSKELETAL MEDICINE & OMM

## 2019-01-01 PROCEDURE — 80053 COMPREHEN METABOLIC PANEL: CPT

## 2019-01-01 PROCEDURE — 80048 BASIC METABOLIC PNL TOTAL CA: CPT

## 2019-01-01 PROCEDURE — 96372 THER/PROPH/DIAG INJ SC/IM: CPT

## 2019-01-01 PROCEDURE — 94760 N-INVAS EAR/PLS OXIMETRY 1: CPT

## 2019-01-01 PROCEDURE — 85025 COMPLETE CBC W/AUTO DIFF WBC: CPT

## 2019-01-01 PROCEDURE — 87086 URINE CULTURE/COLONY COUNT: CPT

## 2019-01-01 PROCEDURE — 86900 BLOOD TYPING SEROLOGIC ABO: CPT

## 2019-01-01 PROCEDURE — 74011250637 HC RX REV CODE- 250/637: Performed by: INTERNAL MEDICINE

## 2019-01-01 PROCEDURE — 65660000000 HC RM CCU STEPDOWN

## 2019-01-01 PROCEDURE — 84484 ASSAY OF TROPONIN QUANT: CPT

## 2019-01-01 PROCEDURE — 85018 HEMOGLOBIN: CPT

## 2019-01-01 PROCEDURE — 0DB68ZX EXCISION OF STOMACH, VIA NATURAL OR ARTIFICIAL OPENING ENDOSCOPIC, DIAGNOSTIC: ICD-10-PCS | Performed by: INTERNAL MEDICINE

## 2019-01-01 PROCEDURE — 96374 THER/PROPH/DIAG INJ IV PUSH: CPT

## 2019-01-01 PROCEDURE — 74011250636 HC RX REV CODE- 250/636: Performed by: EMERGENCY MEDICINE

## 2019-01-01 PROCEDURE — 99218 HC RM OBSERVATION: CPT

## 2019-01-01 PROCEDURE — 71046 X-RAY EXAM CHEST 2 VIEWS: CPT

## 2019-01-01 PROCEDURE — 36415 COLL VENOUS BLD VENIPUNCTURE: CPT

## 2019-01-01 PROCEDURE — 74011000250 HC RX REV CODE- 250: Performed by: EMERGENCY MEDICINE

## 2019-01-01 PROCEDURE — 97116 GAIT TRAINING THERAPY: CPT

## 2019-01-01 PROCEDURE — 0DB58ZX EXCISION OF ESOPHAGUS, VIA NATURAL OR ARTIFICIAL OPENING ENDOSCOPIC, DIAGNOSTIC: ICD-10-PCS | Performed by: INTERNAL MEDICINE

## 2019-01-01 PROCEDURE — 97535 SELF CARE MNGMENT TRAINING: CPT

## 2019-01-01 PROCEDURE — 96375 TX/PRO/DX INJ NEW DRUG ADDON: CPT

## 2019-01-01 PROCEDURE — 76060000031 HC ANESTHESIA FIRST 0.5 HR: Performed by: INTERNAL MEDICINE

## 2019-01-01 PROCEDURE — 0042T CT PERF W CBF: CPT

## 2019-01-01 PROCEDURE — 85027 COMPLETE CBC AUTOMATED: CPT

## 2019-01-01 PROCEDURE — 80061 LIPID PANEL: CPT

## 2019-01-01 PROCEDURE — 71045 X-RAY EXAM CHEST 1 VIEW: CPT

## 2019-01-01 PROCEDURE — 74011250637 HC RX REV CODE- 250/637: Performed by: HOSPITALIST

## 2019-01-01 PROCEDURE — 70450 CT HEAD/BRAIN W/O DYE: CPT

## 2019-01-01 PROCEDURE — C9113 INJ PANTOPRAZOLE SODIUM, VIA: HCPCS | Performed by: HOSPITALIST

## 2019-01-01 PROCEDURE — 82550 ASSAY OF CK (CPK): CPT

## 2019-01-01 PROCEDURE — 76040000019: Performed by: INTERNAL MEDICINE

## 2019-01-01 PROCEDURE — 74011250636 HC RX REV CODE- 250/636: Performed by: NEUROMUSCULOSKELETAL MEDICINE & OMM

## 2019-01-01 PROCEDURE — 93005 ELECTROCARDIOGRAM TRACING: CPT

## 2019-01-01 PROCEDURE — 76706 US ABDL AORTA SCREEN AAA: CPT

## 2019-01-01 PROCEDURE — C9113 INJ PANTOPRAZOLE SODIUM, VIA: HCPCS | Performed by: EMERGENCY MEDICINE

## 2019-01-01 PROCEDURE — 84439 ASSAY OF FREE THYROXINE: CPT

## 2019-01-01 PROCEDURE — 99285 EMERGENCY DEPT VISIT HI MDM: CPT

## 2019-01-01 PROCEDURE — 96361 HYDRATE IV INFUSION ADD-ON: CPT

## 2019-01-01 PROCEDURE — 81001 URINALYSIS AUTO W/SCOPE: CPT

## 2019-01-01 PROCEDURE — 97112 NEUROMUSCULAR REEDUCATION: CPT

## 2019-01-01 PROCEDURE — 74011636320 HC RX REV CODE- 636/320: Performed by: EMERGENCY MEDICINE

## 2019-01-01 PROCEDURE — 97530 THERAPEUTIC ACTIVITIES: CPT | Performed by: OCCUPATIONAL THERAPIST

## 2019-01-01 PROCEDURE — 83690 ASSAY OF LIPASE: CPT

## 2019-01-01 PROCEDURE — 97162 PT EVAL MOD COMPLEX 30 MIN: CPT

## 2019-01-01 PROCEDURE — 74011250636 HC RX REV CODE- 250/636: Performed by: HOSPITALIST

## 2019-01-01 PROCEDURE — 74011000258 HC RX REV CODE- 258: Performed by: EMERGENCY MEDICINE

## 2019-01-01 PROCEDURE — 77030019988 HC FCPS ENDOSC DISP BSC -B: Performed by: INTERNAL MEDICINE

## 2019-01-01 PROCEDURE — 96365 THER/PROPH/DIAG IV INF INIT: CPT

## 2019-01-01 PROCEDURE — 83735 ASSAY OF MAGNESIUM: CPT

## 2019-01-01 PROCEDURE — 84100 ASSAY OF PHOSPHORUS: CPT

## 2019-01-01 PROCEDURE — 85610 PROTHROMBIN TIME: CPT

## 2019-01-01 PROCEDURE — 70551 MRI BRAIN STEM W/O DYE: CPT

## 2019-01-01 PROCEDURE — 74011250636 HC RX REV CODE- 250/636: Performed by: ANESTHESIOLOGY

## 2019-01-01 PROCEDURE — 83880 ASSAY OF NATRIURETIC PEPTIDE: CPT

## 2019-01-01 PROCEDURE — 82962 GLUCOSE BLOOD TEST: CPT

## 2019-01-01 PROCEDURE — 70496 CT ANGIOGRAPHY HEAD: CPT

## 2019-01-01 PROCEDURE — 84443 ASSAY THYROID STIM HORMONE: CPT

## 2019-01-01 PROCEDURE — 99284 EMERGENCY DEPT VISIT MOD MDM: CPT

## 2019-01-01 PROCEDURE — 83036 HEMOGLOBIN GLYCOSYLATED A1C: CPT

## 2019-01-01 PROCEDURE — 82248 BILIRUBIN DIRECT: CPT

## 2019-01-01 PROCEDURE — 88305 TISSUE EXAM BY PATHOLOGIST: CPT

## 2019-01-01 PROCEDURE — 74011000250 HC RX REV CODE- 250: Performed by: HOSPITALIST

## 2019-01-01 PROCEDURE — 97535 SELF CARE MNGMENT TRAINING: CPT | Performed by: OCCUPATIONAL THERAPIST

## 2019-01-01 PROCEDURE — 74011000258 HC RX REV CODE- 258: Performed by: HOSPITALIST

## 2019-01-01 PROCEDURE — 94762 N-INVAS EAR/PLS OXIMTRY CONT: CPT

## 2019-01-01 PROCEDURE — 74011000250 HC RX REV CODE- 250: Performed by: INTERNAL MEDICINE

## 2019-01-01 PROCEDURE — 82272 OCCULT BLD FECES 1-3 TESTS: CPT

## 2019-01-01 PROCEDURE — 84145 PROCALCITONIN (PCT): CPT

## 2019-01-01 PROCEDURE — 74011250636 HC RX REV CODE- 250/636: Performed by: PSYCHIATRY & NEUROLOGY

## 2019-01-01 PROCEDURE — C8929 TTE W OR WO FOL WCON,DOPPLER: HCPCS

## 2019-01-01 PROCEDURE — 87077 CULTURE AEROBIC IDENTIFY: CPT | Performed by: INTERNAL MEDICINE

## 2019-01-01 PROCEDURE — 97530 THERAPEUTIC ACTIVITIES: CPT

## 2019-01-01 PROCEDURE — 65270000029 HC RM PRIVATE

## 2019-01-01 RX ORDER — OLMESARTAN MEDOXOMIL AND HYDROCHLOROTHIAZIDE 40/12.5 40; 12.5 MG/1; MG/1
TABLET ORAL
Refills: 5 | COMMUNITY
Start: 2019-01-01

## 2019-01-01 RX ORDER — FAMOTIDINE 20 MG/1
20 TABLET, FILM COATED ORAL 2 TIMES DAILY
Qty: 20 TAB | Refills: 0 | Status: SHIPPED | OUTPATIENT
Start: 2019-01-01 | End: 2019-01-01

## 2019-01-01 RX ORDER — FLUTICASONE PROPIONATE 50 MCG
1 SPRAY, SUSPENSION (ML) NASAL
COMMUNITY

## 2019-01-01 RX ORDER — CEPHALEXIN 500 MG/1
500 CAPSULE ORAL 4 TIMES DAILY
Qty: 28 CAP | Refills: 0 | Status: SHIPPED | OUTPATIENT
Start: 2019-01-01 | End: 2019-01-01

## 2019-01-01 RX ORDER — ATORVASTATIN CALCIUM 40 MG/1
40 TABLET, FILM COATED ORAL
Status: DISCONTINUED | OUTPATIENT
Start: 2019-01-01 | End: 2019-01-01 | Stop reason: HOSPADM

## 2019-01-01 RX ORDER — SODIUM CHLORIDE 0.9 % (FLUSH) 0.9 %
5-40 SYRINGE (ML) INJECTION AS NEEDED
Status: DISCONTINUED | OUTPATIENT
Start: 2019-01-01 | End: 2019-01-01 | Stop reason: HOSPADM

## 2019-01-01 RX ORDER — CARVEDILOL 12.5 MG/1
12.5 TABLET ORAL 2 TIMES DAILY WITH MEALS
Status: DISCONTINUED | OUTPATIENT
Start: 2019-01-01 | End: 2019-01-01 | Stop reason: HOSPADM

## 2019-01-01 RX ORDER — NALOXONE HYDROCHLORIDE 0.4 MG/ML
0.4 INJECTION, SOLUTION INTRAMUSCULAR; INTRAVENOUS; SUBCUTANEOUS
Status: DISCONTINUED | OUTPATIENT
Start: 2019-01-01 | End: 2019-01-01 | Stop reason: HOSPADM

## 2019-01-01 RX ORDER — HEPARIN SODIUM 5000 [USP'U]/ML
5000 INJECTION, SOLUTION INTRAVENOUS; SUBCUTANEOUS EVERY 8 HOURS
Status: DISCONTINUED | OUTPATIENT
Start: 2019-01-01 | End: 2019-01-01

## 2019-01-01 RX ORDER — NALOXONE HYDROCHLORIDE 0.4 MG/ML
0.4 INJECTION, SOLUTION INTRAMUSCULAR; INTRAVENOUS; SUBCUTANEOUS AS NEEDED
Status: DISCONTINUED | OUTPATIENT
Start: 2019-01-01 | End: 2019-01-01 | Stop reason: HOSPADM

## 2019-01-01 RX ORDER — ACETAMINOPHEN 325 MG/1
650 TABLET ORAL
Status: DISCONTINUED | OUTPATIENT
Start: 2019-01-01 | End: 2019-01-01 | Stop reason: HOSPADM

## 2019-01-01 RX ORDER — AMOXICILLIN 250 MG/1
1000 CAPSULE ORAL EVERY 12 HOURS
Status: DISCONTINUED | OUTPATIENT
Start: 2019-01-01 | End: 2019-01-01 | Stop reason: HOSPADM

## 2019-01-01 RX ORDER — RANOLAZINE 500 MG/1
500 TABLET, EXTENDED RELEASE ORAL 2 TIMES DAILY
Qty: 30 TAB | Refills: 0 | Status: SHIPPED | OUTPATIENT
Start: 2019-01-01

## 2019-01-01 RX ORDER — AMLODIPINE BESYLATE 10 MG/1
10 TABLET ORAL DAILY
Qty: 30 TAB | Refills: 0 | Status: SHIPPED
Start: 2019-01-01 | End: 2019-01-01

## 2019-01-01 RX ORDER — FLUTICASONE PROPIONATE 50 MCG
SPRAY, SUSPENSION (ML) NASAL
Qty: 48 G | Refills: 3 | Status: SHIPPED | OUTPATIENT
Start: 2019-01-01 | End: 2019-01-01 | Stop reason: DRUGHIGH

## 2019-01-01 RX ORDER — ASPIRIN 81 MG/1
81 TABLET ORAL DAILY
Status: DISCONTINUED | OUTPATIENT
Start: 2019-01-01 | End: 2019-01-01 | Stop reason: HOSPADM

## 2019-01-01 RX ORDER — ATORVASTATIN CALCIUM 40 MG/1
40 TABLET, FILM COATED ORAL
Qty: 30 TAB | Refills: 0 | Status: SHIPPED
Start: 2019-01-01

## 2019-01-01 RX ORDER — DICLOFENAC SODIUM 10 MG/G
GEL TOPICAL 4 TIMES DAILY
Qty: 100 G | Refills: 12 | Status: SHIPPED | OUTPATIENT
Start: 2019-01-01 | End: 2019-01-01

## 2019-01-01 RX ORDER — IBUPROFEN 600 MG/1
600 TABLET ORAL
COMMUNITY
End: 2019-01-01

## 2019-01-01 RX ORDER — FENTANYL CITRATE 50 UG/ML
25 INJECTION, SOLUTION INTRAMUSCULAR; INTRAVENOUS
Status: DISCONTINUED | OUTPATIENT
Start: 2019-01-01 | End: 2019-01-01 | Stop reason: HOSPADM

## 2019-01-01 RX ORDER — SODIUM CHLORIDE 0.9 % (FLUSH) 0.9 %
5-40 SYRINGE (ML) INJECTION EVERY 8 HOURS
Status: DISCONTINUED | OUTPATIENT
Start: 2019-01-01 | End: 2019-01-01 | Stop reason: HOSPADM

## 2019-01-01 RX ORDER — EZETIMIBE 10 MG/1
10 TABLET ORAL DAILY
Qty: 30 TAB | Refills: 0 | Status: SHIPPED
Start: 2019-01-01

## 2019-01-01 RX ORDER — CLARITHROMYCIN 500 MG/1
500 TABLET, FILM COATED ORAL 2 TIMES DAILY
Status: DISCONTINUED | OUTPATIENT
Start: 2019-01-01 | End: 2019-01-01 | Stop reason: HOSPADM

## 2019-01-01 RX ORDER — RANOLAZINE 500 MG/1
TABLET, EXTENDED RELEASE ORAL 2 TIMES DAILY
Status: ON HOLD | COMMUNITY
End: 2019-01-01 | Stop reason: SDUPTHER

## 2019-01-01 RX ORDER — ATORVASTATIN CALCIUM 40 MG/1
40 TABLET, FILM COATED ORAL
Status: ON HOLD | COMMUNITY
End: 2019-01-01 | Stop reason: SDUPTHER

## 2019-01-01 RX ORDER — DEXTROSE MONOHYDRATE AND SODIUM CHLORIDE 5; .9 G/100ML; G/100ML
25 INJECTION, SOLUTION INTRAVENOUS CONTINUOUS
Status: DISCONTINUED | OUTPATIENT
Start: 2019-01-01 | End: 2019-01-01 | Stop reason: HOSPADM

## 2019-01-01 RX ORDER — PANTOPRAZOLE SODIUM 40 MG/1
40 TABLET, DELAYED RELEASE ORAL
Qty: 28 TAB | Refills: 4 | Status: SHIPPED | OUTPATIENT
Start: 2019-01-01

## 2019-01-01 RX ORDER — ONDANSETRON 4 MG/1
4 TABLET, ORALLY DISINTEGRATING ORAL
Qty: 20 TAB | Refills: 0 | Status: SHIPPED | OUTPATIENT
Start: 2019-01-01 | End: 2019-01-01 | Stop reason: ALTCHOICE

## 2019-01-01 RX ORDER — ATROPINE SULFATE 0.1 MG/ML
0.5 INJECTION INTRAVENOUS
Status: DISCONTINUED | OUTPATIENT
Start: 2019-01-01 | End: 2019-01-01 | Stop reason: HOSPADM

## 2019-01-01 RX ORDER — ONDANSETRON 2 MG/ML
4 INJECTION INTRAMUSCULAR; INTRAVENOUS
Status: DISCONTINUED | OUTPATIENT
Start: 2019-01-01 | End: 2019-01-01 | Stop reason: HOSPADM

## 2019-01-01 RX ORDER — MECLIZINE HCL 25MG 25 MG/1
25 TABLET, CHEWABLE ORAL
Qty: 20 TAB | Refills: 0 | Status: SHIPPED | OUTPATIENT
Start: 2019-01-01 | End: 2019-01-01

## 2019-01-01 RX ORDER — LABETALOL HYDROCHLORIDE 5 MG/ML
5 INJECTION, SOLUTION INTRAVENOUS
Status: DISCONTINUED | OUTPATIENT
Start: 2019-01-01 | End: 2019-01-01 | Stop reason: HOSPADM

## 2019-01-01 RX ORDER — IRBESARTAN AND HYDROCHLOROTHIAZIDE 300; 12.5 MG/1; MG/1
1 TABLET, FILM COATED ORAL DAILY
COMMUNITY
End: 2019-01-01 | Stop reason: ALTCHOICE

## 2019-01-01 RX ORDER — DEXTROMETHORPHAN/PSEUDOEPHED 2.5-7.5/.8
1.2 DROPS ORAL
Status: DISCONTINUED | OUTPATIENT
Start: 2019-01-01 | End: 2019-01-01 | Stop reason: HOSPADM

## 2019-01-01 RX ORDER — CLOPIDOGREL BISULFATE 75 MG/1
75 TABLET ORAL DAILY
Status: DISCONTINUED | OUTPATIENT
Start: 2019-01-01 | End: 2019-01-01

## 2019-01-01 RX ORDER — AMLODIPINE BESYLATE 5 MG/1
10 TABLET ORAL DAILY
Status: DISCONTINUED | OUTPATIENT
Start: 2019-01-01 | End: 2019-01-01 | Stop reason: HOSPADM

## 2019-01-01 RX ORDER — ENOXAPARIN SODIUM 100 MG/ML
70 INJECTION SUBCUTANEOUS EVERY 12 HOURS
Status: DISCONTINUED | OUTPATIENT
Start: 2019-01-01 | End: 2019-01-01

## 2019-01-01 RX ORDER — ONDANSETRON 2 MG/ML
INJECTION INTRAMUSCULAR; INTRAVENOUS
Status: DISPENSED
Start: 2019-01-01 | End: 2019-01-01

## 2019-01-01 RX ORDER — PROPOFOL 10 MG/ML
INJECTION, EMULSION INTRAVENOUS AS NEEDED
Status: DISCONTINUED | OUTPATIENT
Start: 2019-01-01 | End: 2019-01-01 | Stop reason: HOSPADM

## 2019-01-01 RX ORDER — AMLODIPINE BESYLATE 5 MG/1
5 TABLET ORAL DAILY
Qty: 30 TAB | Refills: 12 | Status: SHIPPED | OUTPATIENT
Start: 2019-01-01

## 2019-01-01 RX ORDER — AMOXICILLIN 500 MG/1
1000 CAPSULE ORAL EVERY 12 HOURS
Qty: 28 CAP | Refills: 0 | Status: SHIPPED | OUTPATIENT
Start: 2019-01-01

## 2019-01-01 RX ORDER — ONDANSETRON 2 MG/ML
4 INJECTION INTRAMUSCULAR; INTRAVENOUS
Status: COMPLETED | OUTPATIENT
Start: 2019-01-01 | End: 2019-01-01

## 2019-01-01 RX ORDER — ATORVASTATIN CALCIUM 40 MG/1
40 TABLET, FILM COATED ORAL
Status: DISCONTINUED | OUTPATIENT
Start: 2019-01-01 | End: 2019-01-01

## 2019-01-01 RX ORDER — EZETIMIBE 10 MG/1
10 TABLET ORAL DAILY
Status: DISCONTINUED | OUTPATIENT
Start: 2019-01-01 | End: 2019-01-01 | Stop reason: HOSPADM

## 2019-01-01 RX ORDER — PANTOPRAZOLE SODIUM 40 MG/10ML
40 INJECTION, POWDER, LYOPHILIZED, FOR SOLUTION INTRAVENOUS
Status: DISCONTINUED | OUTPATIENT
Start: 2019-01-01 | End: 2019-01-01

## 2019-01-01 RX ORDER — PANTOPRAZOLE SODIUM 40 MG/1
40 TABLET, DELAYED RELEASE ORAL
Status: DISCONTINUED | OUTPATIENT
Start: 2019-01-01 | End: 2019-01-01 | Stop reason: HOSPADM

## 2019-01-01 RX ORDER — POTASSIUM CHLORIDE 20 MEQ/1
20 TABLET, EXTENDED RELEASE ORAL DAILY
Status: DISCONTINUED | OUTPATIENT
Start: 2019-01-01 | End: 2019-01-01 | Stop reason: HOSPADM

## 2019-01-01 RX ORDER — LORAZEPAM 2 MG/ML
1 INJECTION INTRAMUSCULAR ONCE
Status: COMPLETED | OUTPATIENT
Start: 2019-01-01 | End: 2019-01-01

## 2019-01-01 RX ORDER — ONDANSETRON 4 MG/1
4 TABLET, ORALLY DISINTEGRATING ORAL
Qty: 20 TAB | Refills: 0 | Status: SHIPPED | OUTPATIENT
Start: 2019-01-01

## 2019-01-01 RX ORDER — SODIUM CHLORIDE 0.9 % (FLUSH) 0.9 %
10 SYRINGE (ML) INJECTION
Status: COMPLETED | OUTPATIENT
Start: 2019-01-01 | End: 2019-01-01

## 2019-01-01 RX ORDER — LORAZEPAM 2 MG/ML
2 INJECTION INTRAMUSCULAR
Status: COMPLETED | OUTPATIENT
Start: 2019-01-01 | End: 2019-01-01

## 2019-01-01 RX ORDER — MIDAZOLAM HYDROCHLORIDE 1 MG/ML
.25-5 INJECTION, SOLUTION INTRAMUSCULAR; INTRAVENOUS
Status: DISCONTINUED | OUTPATIENT
Start: 2019-01-01 | End: 2019-01-01 | Stop reason: HOSPADM

## 2019-01-01 RX ORDER — CARVEDILOL 3.12 MG/1
3.12 TABLET ORAL 2 TIMES DAILY WITH MEALS
Status: DISCONTINUED | OUTPATIENT
Start: 2019-01-01 | End: 2019-01-01

## 2019-01-01 RX ORDER — FLUMAZENIL 0.1 MG/ML
0.2 INJECTION INTRAVENOUS
Status: DISCONTINUED | OUTPATIENT
Start: 2019-01-01 | End: 2019-01-01 | Stop reason: HOSPADM

## 2019-01-01 RX ORDER — CLARITHROMYCIN 500 MG/1
500 TABLET, FILM COATED ORAL 2 TIMES DAILY
Qty: 28 TAB | Refills: 0 | Status: SHIPPED | OUTPATIENT
Start: 2019-01-01

## 2019-01-01 RX ORDER — MECLIZINE HCL 12.5 MG 12.5 MG/1
25 TABLET ORAL
Status: COMPLETED | OUTPATIENT
Start: 2019-01-01 | End: 2019-01-01

## 2019-01-01 RX ORDER — LIDOCAINE HYDROCHLORIDE 20 MG/ML
INJECTION, SOLUTION EPIDURAL; INFILTRATION; INTRACAUDAL; PERINEURAL AS NEEDED
Status: DISCONTINUED | OUTPATIENT
Start: 2019-01-01 | End: 2019-01-01 | Stop reason: HOSPADM

## 2019-01-01 RX ORDER — AMLODIPINE BESYLATE 10 MG/1
10 TABLET ORAL DAILY
Status: ON HOLD | COMMUNITY
End: 2019-01-01 | Stop reason: SDUPTHER

## 2019-01-01 RX ORDER — EPINEPHRINE 0.1 MG/ML
1 INJECTION INTRACARDIAC; INTRAVENOUS
Status: DISCONTINUED | OUTPATIENT
Start: 2019-01-01 | End: 2019-01-01 | Stop reason: HOSPADM

## 2019-01-01 RX ORDER — PANTOPRAZOLE SODIUM 40 MG/1
40 TABLET, DELAYED RELEASE ORAL
Qty: 28 TAB | Refills: 4 | Status: SHIPPED | OUTPATIENT
Start: 2019-01-01 | End: 2019-01-01

## 2019-01-01 RX ORDER — SODIUM CHLORIDE 9 MG/ML
75 INJECTION, SOLUTION INTRAVENOUS CONTINUOUS
Status: DISPENSED | OUTPATIENT
Start: 2019-01-01 | End: 2019-01-01

## 2019-01-01 RX ORDER — MORPHINE SULFATE 4 MG/ML
4 INJECTION INTRAVENOUS
Status: DISCONTINUED | OUTPATIENT
Start: 2019-01-01 | End: 2019-01-01

## 2019-01-01 RX ORDER — NITROGLYCERIN 0.4 MG/1
0.4 TABLET SUBLINGUAL
Status: DISCONTINUED | OUTPATIENT
Start: 2019-01-01 | End: 2019-01-01 | Stop reason: HOSPADM

## 2019-01-01 RX ADMIN — POTASSIUM CHLORIDE 20 MEQ: 20 TABLET, EXTENDED RELEASE ORAL at 10:08

## 2019-01-01 RX ADMIN — RIVAROXABAN 20 MG: 20 TABLET, FILM COATED ORAL at 09:05

## 2019-01-01 RX ADMIN — Medication 10 ML: at 21:25

## 2019-01-01 RX ADMIN — HEPARIN SODIUM 5000 UNITS: 5000 INJECTION INTRAVENOUS; SUBCUTANEOUS at 21:26

## 2019-01-01 RX ADMIN — HEPARIN SODIUM 5000 UNITS: 5000 INJECTION INTRAVENOUS; SUBCUTANEOUS at 06:33

## 2019-01-01 RX ADMIN — AMLODIPINE BESYLATE 10 MG: 5 TABLET ORAL at 09:16

## 2019-01-01 RX ADMIN — EZETIMIBE 10 MG: 10 TABLET ORAL at 09:05

## 2019-01-01 RX ADMIN — POTASSIUM CHLORIDE 20 MEQ: 20 TABLET, EXTENDED RELEASE ORAL at 09:16

## 2019-01-01 RX ADMIN — EZETIMIBE 10 MG: 10 TABLET ORAL at 09:56

## 2019-01-01 RX ADMIN — ASPIRIN 81 MG: 81 TABLET ORAL at 09:56

## 2019-01-01 RX ADMIN — EZETIMIBE 10 MG: 10 TABLET ORAL at 09:17

## 2019-01-01 RX ADMIN — CARVEDILOL 3.12 MG: 3.12 TABLET, FILM COATED ORAL at 18:11

## 2019-01-01 RX ADMIN — CEFTRIAXONE 1 G: 1 INJECTION, POWDER, FOR SOLUTION INTRAMUSCULAR; INTRAVENOUS at 17:29

## 2019-01-01 RX ADMIN — EZETIMIBE 10 MG: 10 TABLET ORAL at 08:54

## 2019-01-01 RX ADMIN — HEPARIN SODIUM 5000 UNITS: 5000 INJECTION INTRAVENOUS; SUBCUTANEOUS at 05:35

## 2019-01-01 RX ADMIN — FAMOTIDINE 20 MG: 10 INJECTION, SOLUTION INTRAVENOUS at 07:46

## 2019-01-01 RX ADMIN — CARVEDILOL 3.12 MG: 3.12 TABLET, FILM COATED ORAL at 09:56

## 2019-01-01 RX ADMIN — Medication 10 ML: at 05:41

## 2019-01-01 RX ADMIN — ENOXAPARIN SODIUM 70 MG: 80 INJECTION SUBCUTANEOUS at 15:28

## 2019-01-01 RX ADMIN — ACETAMINOPHEN 650 MG: 325 TABLET ORAL at 18:05

## 2019-01-01 RX ADMIN — PERFLUTREN 2 ML: 6.52 INJECTION, SUSPENSION INTRAVENOUS at 10:28

## 2019-01-01 RX ADMIN — EZETIMIBE 10 MG: 10 TABLET ORAL at 10:08

## 2019-01-01 RX ADMIN — DEXTROSE MONOHYDRATE AND SODIUM CHLORIDE 25 ML/HR: 5; .9 INJECTION, SOLUTION INTRAVENOUS at 22:31

## 2019-01-01 RX ADMIN — SODIUM CHLORIDE 1000 ML: 900 INJECTION, SOLUTION INTRAVENOUS at 10:57

## 2019-01-01 RX ADMIN — CLOPIDOGREL BISULFATE 75 MG: 75 TABLET ORAL at 10:08

## 2019-01-01 RX ADMIN — LORAZEPAM 1 MG: 2 INJECTION INTRAMUSCULAR at 10:07

## 2019-01-01 RX ADMIN — CLOPIDOGREL BISULFATE 75 MG: 75 TABLET ORAL at 08:54

## 2019-01-01 RX ADMIN — POTASSIUM CHLORIDE 20 MEQ: 20 TABLET, EXTENDED RELEASE ORAL at 09:56

## 2019-01-01 RX ADMIN — LIDOCAINE HYDROCHLORIDE 100 MG: 20 INJECTION, SOLUTION EPIDURAL; INFILTRATION; INTRACAUDAL; PERINEURAL at 11:25

## 2019-01-01 RX ADMIN — Medication 10 ML: at 03:37

## 2019-01-01 RX ADMIN — Medication 10 ML: at 09:57

## 2019-01-01 RX ADMIN — HEPARIN SODIUM 5000 UNITS: 5000 INJECTION INTRAVENOUS; SUBCUTANEOUS at 21:18

## 2019-01-01 RX ADMIN — HEPARIN SODIUM 5000 UNITS: 5000 INJECTION INTRAVENOUS; SUBCUTANEOUS at 14:48

## 2019-01-01 RX ADMIN — Medication 10 ML: at 14:00

## 2019-01-01 RX ADMIN — ENOXAPARIN SODIUM 70 MG: 80 INJECTION SUBCUTANEOUS at 18:00

## 2019-01-01 RX ADMIN — HEPARIN SODIUM 5000 UNITS: 5000 INJECTION INTRAVENOUS; SUBCUTANEOUS at 06:30

## 2019-01-01 RX ADMIN — Medication 10 ML: at 13:37

## 2019-01-01 RX ADMIN — Medication 10 ML: at 09:13

## 2019-01-01 RX ADMIN — CARVEDILOL 12.5 MG: 12.5 TABLET, FILM COATED ORAL at 09:06

## 2019-01-01 RX ADMIN — Medication 10 ML: at 05:36

## 2019-01-01 RX ADMIN — ACETAMINOPHEN 650 MG: 325 TABLET ORAL at 15:28

## 2019-01-01 RX ADMIN — MECLIZINE 25 MG: 12.5 TABLET ORAL at 10:56

## 2019-01-01 RX ADMIN — Medication 10 ML: at 21:27

## 2019-01-01 RX ADMIN — CARVEDILOL 3.12 MG: 3.12 TABLET, FILM COATED ORAL at 17:19

## 2019-01-01 RX ADMIN — Medication 10 ML: at 08:34

## 2019-01-01 RX ADMIN — CLARITHROMYCIN 500 MG: 500 TABLET ORAL at 13:22

## 2019-01-01 RX ADMIN — ATORVASTATIN CALCIUM 40 MG: 40 TABLET, FILM COATED ORAL at 21:19

## 2019-01-01 RX ADMIN — AMLODIPINE BESYLATE 10 MG: 5 TABLET ORAL at 09:56

## 2019-01-01 RX ADMIN — ASPIRIN 81 MG: 81 TABLET ORAL at 09:16

## 2019-01-01 RX ADMIN — PANTOPRAZOLE SODIUM 40 MG: 40 TABLET, DELAYED RELEASE ORAL at 17:59

## 2019-01-01 RX ADMIN — Medication 10 ML: at 15:28

## 2019-01-01 RX ADMIN — ATORVASTATIN CALCIUM 40 MG: 40 TABLET, FILM COATED ORAL at 21:26

## 2019-01-01 RX ADMIN — POTASSIUM CHLORIDE 20 MEQ: 20 TABLET, EXTENDED RELEASE ORAL at 08:54

## 2019-01-01 RX ADMIN — HYDROCHLOROTHIAZIDE: 12.5 CAPSULE ORAL at 09:58

## 2019-01-01 RX ADMIN — HEPARIN SODIUM 5000 UNITS: 5000 INJECTION INTRAVENOUS; SUBCUTANEOUS at 15:04

## 2019-01-01 RX ADMIN — HEPARIN SODIUM 5000 UNITS: 5000 INJECTION INTRAVENOUS; SUBCUTANEOUS at 13:38

## 2019-01-01 RX ADMIN — PROPOFOL 150 MG: 10 INJECTION, EMULSION INTRAVENOUS at 11:39

## 2019-01-01 RX ADMIN — CARVEDILOL 12.5 MG: 12.5 TABLET, FILM COATED ORAL at 10:08

## 2019-01-01 RX ADMIN — ASPIRIN 81 MG: 81 TABLET ORAL at 10:08

## 2019-01-01 RX ADMIN — SODIUM CHLORIDE 75 ML/HR: 900 INJECTION, SOLUTION INTRAVENOUS at 11:16

## 2019-01-01 RX ADMIN — POTASSIUM CHLORIDE 20 MEQ: 20 TABLET, EXTENDED RELEASE ORAL at 09:05

## 2019-01-01 RX ADMIN — SODIUM CHLORIDE 500 ML: 900 INJECTION, SOLUTION INTRAVENOUS at 18:21

## 2019-01-01 RX ADMIN — AMLODIPINE BESYLATE 10 MG: 5 TABLET ORAL at 10:08

## 2019-01-01 RX ADMIN — Medication 10 ML: at 13:58

## 2019-01-01 RX ADMIN — CARVEDILOL 12.5 MG: 12.5 TABLET, FILM COATED ORAL at 17:46

## 2019-01-01 RX ADMIN — AMOXICILLIN 1000 MG: 250 CAPSULE ORAL at 13:21

## 2019-01-01 RX ADMIN — ATORVASTATIN CALCIUM 40 MG: 40 TABLET, FILM COATED ORAL at 22:25

## 2019-01-01 RX ADMIN — IOPAMIDOL 110 ML: 755 INJECTION, SOLUTION INTRAVENOUS at 09:13

## 2019-01-01 RX ADMIN — Medication 10 ML: at 06:29

## 2019-01-01 RX ADMIN — ATORVASTATIN CALCIUM 40 MG: 40 TABLET, FILM COATED ORAL at 21:25

## 2019-01-01 RX ADMIN — ACETAMINOPHEN 650 MG: 325 TABLET ORAL at 21:25

## 2019-01-01 RX ADMIN — CARVEDILOL 3.12 MG: 3.12 TABLET, FILM COATED ORAL at 09:17

## 2019-01-01 RX ADMIN — CLOPIDOGREL BISULFATE 75 MG: 75 TABLET ORAL at 09:56

## 2019-01-01 RX ADMIN — PANTOPRAZOLE SODIUM 40 MG: 40 TABLET, DELAYED RELEASE ORAL at 13:21

## 2019-01-01 RX ADMIN — SODIUM CHLORIDE 1000 ML: 900 INJECTION, SOLUTION INTRAVENOUS at 11:35

## 2019-01-01 RX ADMIN — ACETAMINOPHEN 650 MG: 325 TABLET ORAL at 09:05

## 2019-01-01 RX ADMIN — Medication 10 ML: at 14:05

## 2019-01-01 RX ADMIN — CARVEDILOL 12.5 MG: 12.5 TABLET, FILM COATED ORAL at 18:00

## 2019-01-01 RX ADMIN — HYDROCHLOROTHIAZIDE: 12.5 CAPSULE ORAL at 09:09

## 2019-01-01 RX ADMIN — ENOXAPARIN SODIUM 70 MG: 80 INJECTION SUBCUTANEOUS at 03:41

## 2019-01-01 RX ADMIN — ONDANSETRON 4 MG: 2 INJECTION INTRAMUSCULAR; INTRAVENOUS at 07:33

## 2019-01-01 RX ADMIN — LORAZEPAM 2 MG: 2 INJECTION INTRAMUSCULAR at 10:56

## 2019-01-01 RX ADMIN — Medication 10 ML: at 21:22

## 2019-01-01 RX ADMIN — HYDROCHLOROTHIAZIDE: 12.5 CAPSULE ORAL at 10:32

## 2019-01-01 RX ADMIN — CLOPIDOGREL BISULFATE 75 MG: 75 TABLET ORAL at 09:16

## 2019-01-01 RX ADMIN — Medication 10 ML: at 22:26

## 2019-01-01 RX ADMIN — SODIUM CHLORIDE 40 MG: 9 INJECTION, SOLUTION INTRAMUSCULAR; INTRAVENOUS; SUBCUTANEOUS at 09:39

## 2019-01-01 RX ADMIN — Medication 10 ML: at 22:53

## 2019-01-01 RX ADMIN — HEPARIN SODIUM 5000 UNITS: 5000 INJECTION INTRAVENOUS; SUBCUTANEOUS at 22:53

## 2019-01-01 RX ADMIN — ATORVASTATIN CALCIUM 40 MG: 40 TABLET, FILM COATED ORAL at 22:32

## 2019-01-01 RX ADMIN — ACETAMINOPHEN 650 MG: 325 TABLET ORAL at 22:52

## 2019-01-01 RX ADMIN — AMLODIPINE BESYLATE 10 MG: 5 TABLET ORAL at 09:05

## 2019-01-01 RX ADMIN — PANTOPRAZOLE SODIUM 40 MG: 40 INJECTION, POWDER, FOR SOLUTION INTRAVENOUS at 20:30

## 2019-01-01 RX ADMIN — ATORVASTATIN CALCIUM 40 MG: 40 TABLET, FILM COATED ORAL at 22:53

## 2019-01-01 RX ADMIN — Medication 10 ML: at 22:32

## 2019-01-01 RX ADMIN — Medication 10 ML: at 03:50

## 2019-01-01 RX ADMIN — ACETAMINOPHEN 650 MG: 325 TABLET ORAL at 06:37

## 2019-01-01 RX ADMIN — HYDROCHLOROTHIAZIDE: 12.5 CAPSULE ORAL at 09:17

## 2019-01-01 RX ADMIN — CLARITHROMYCIN 500 MG: 500 TABLET ORAL at 17:59

## 2019-01-01 RX ADMIN — ONDANSETRON 4 MG: 2 INJECTION INTRAMUSCULAR; INTRAVENOUS at 08:55

## 2019-01-01 RX ADMIN — Medication 10 ML: at 10:07

## 2019-01-01 RX ADMIN — ASPIRIN 81 MG: 81 TABLET ORAL at 09:05

## 2019-01-01 RX ADMIN — ASPIRIN 81 MG: 81 TABLET ORAL at 08:54

## 2019-01-07 NOTE — PATIENT INSTRUCTIONS
[employer] Activation Thank you for requesting access to [employer]. Please follow the instructions below to securely access and download your online medical record. [employer] allows you to send messages to your doctor, view your test results, renew your prescriptions, schedule appointments, and more. How Do I Sign Up? 1. In your internet browser, go to [email or web address] 
2. Click on the First Time User? Click Here link in the Sign In box. You will be redirect to the New Member Sign Up page. 3. Enter your [employer] Access Code exactly as it appears below. You will not need to use this code after youve completed the sign-up process. If you do not sign up before the expiration date, you must request a new code. Hojo.pl Access Code: [email or web address] Expires: 3/4/2019  9:13 AM (This is the date your [employer] access code will ) 4. Enter the last four digits of your Social Security Number (xxxx) and Date of Birth (mm/dd/yyyy) as indicated and click Submit. You will be taken to the next sign-up page. 5. Create a Hojo.pl ID. This will be your [employer]pl login ID and cannot be changed, so think of one that is secure and easy to remember. 6. Create a [employer]pl password. You can change your password at any time. 7. Enter your Password Reset Question and Answer. This can be used at a later time if you forget your password. 8. Enter your e-mail address. You will receive e-mail notification when new information is available in [address] 9. Click Sign Up. You can now view and download portions of your medical record. 10. Click the Download Summary menu link to download a portable copy of your medical information. Additional Information If you have questions, please visit the Frequently Asked Questions section of the [employer] website at [email or web address] [employer]/Cruise Comparehart/. Remember, [employer]jo[employer] is NOT to be used for urgent needs. For medical emergencies, dial 911.

## 2019-01-07 NOTE — PROGRESS NOTES
Leti Manning is a 68 y.o. male and presents with Knee Pain (f/u) and Other (psa issue) Eura Grzegorz Subjective: 
 
Knee pain Review: 
Patient complains of right knee pain. Onset of the symptoms was months  ago. Inciting event: longstanding problem which has been getting worse. Current symptoms include pain location: both: medial and swelling. none,  Aggravating symptoms: going up and down stairs, walking, lateral movements, any weight bearing. Patient's overall course: gradually worsening Patient has had prior knee problems. Evaluation to date: none. Treatment to date:NSAIDS/steroid injection Hip Pain Review Patient presents for evaluation of hip pains for a few day(s). Pain is located inright   hip,rated as a scale of 8/10  is described as aching, dull, and is intermittent . Associated symptoms include: decreased ROM. Related to injury:   no. The patient has tried NSAIDs for pain, with minimal relief. Coronary Disease Review: 
Patient complains of no chest pain today. There has not been the need to use NTG. Previous cardiac testing has included: Electrocardiogram (EKG), Echocardiogram, CABG/Stent placement. Health maintenance suggests the needs for a colonoscopy Hypertension Review: 
The patient has essential hypertension Diet and Lifestyle: generally follows a  low sodium diet, exercises sporadically Home BP Monitoring: is not measured at home. Pertinent ROS: taking medications as instructed, no medication side effects noted, no TIA's, no chest pain on exertion, no dyspnea on exertion, no swelling of ankles. Review of Systems Constitutional: negative for fevers, chills, anorexia and weight loss Eyes:   negative for visual disturbance and irritation ENT:   negative for tinnitus,sore throat,nasal congestion,ear pains. hoarseness Respiratory:  negative for cough, hemoptysis, dyspnea,wheezing CV:   negative for chest pain, palpitations, lower extremity edema GI:   negative for nausea, vomiting, diarrhea, abdominal pain,melena Endo:               negative for polyuria,polydipsia,polyphagia,heat intolerance Genitourinary: negative for frequency, dysuria and hematuria Integument:  negative for rash and pruritus Hematologic:  negative for easy bruising and gum/nose bleeding Musculoskel:  joint pain Neurological:  negative for headaches, dizziness, vertigo, memory problems and gait Behavl/Psych: negative for feelings of anxiety, depression, mood changes Past Medical History:  
Diagnosis Date  Anxiety state, unspecified 8/27/2013  Arthritis  CAD (coronary artery disease)  Cataract cortical, senile 8/27/2013  Heart failure ([facility] [address] [place] 1993  Hypertension  Hypertensive retinopathy 8/27/2013  [facility] for other specified suspected conditions [facility]  Other ill-defined conditions(799.89) chronic back pain  Other ill-defined conditions(799.89) AGENT ORANGE EXPOSURE,   
 Psychiatric disorder PTSD, NO MEDS  Stroke ([facility] [address] TIA R HAND, RESOLVED  Unspecified adverse effect of anesthesia SLOW TO WAKE, AGITATED WHEN HE WAKES  
 Unspecified reason for consultation 8/27/2013 Health maintenance Past Surgical History:  
Procedure Laterality Date  ABDOMEN SURGERY PROC UNLISTED    
 x2 hernia repairs  CARDIAC SURG PROCEDURE UNLIST    
 stent, cardiologist Dr. Lino Jurado  HX ORTHOPAEDIC Right ROTATOR CUFF X2 Social History Socioeconomic History  Marital status: SINGLE Spouse name: Not on file  Number of children: Not on file  Years of education: Not on file  Highest education level: Not on file Tobacco Use  Smoking status: Former Smoker Packs/day: 0.50 Years: 19.00 Pack years: 9.50  Smokeless tobacco: Former User Quit date: 2/28/1984 Substance and Sexual Activity  Alcohol use: No  
 Drug use:  No  
 
 Family History Problem Relation Age of Onset  Cancer Mother  Heart Attack Mother  Cancer Father  Anesth Problems Neg Hx Current Outpatient Medications Medication Sig Dispense Refill  irbesartan-hydroCHLOROthiazide (AVALIDE) 300-12.5 mg per tablet TK 1 T PO ONCE D  3  
 clotrimazole-betamethasone (LOTRISONE) topical cream APPLY TO AREA TWICE A  g 3  
 fluticasone (FLONASE) 50 mcg/actuation nasal spray USE 2 SPRAYS IN EACH NOSTRIL DAILY 48 g 3  
 atorvastatin (LIPITOR) 80 mg tablet Take 1 Tab by mouth daily. 30 Tab 12  
 aspirin delayed-release 81 mg tablet   2  
 COREG CR 40 mg CR capsule   5  clopidogrel (PLAVIX) 75 mg tablet   5  
 NITROSTAT 0.4 mg SL tablet   3  potassium chloride (K-DUR, KLOR-CON) 20 mEq tablet   5  
 RANEXA 500 mg SR tablet   5  
 valsartan-hydrochlorothiazide (DIOVAN-HCT) 320-25 mg per tablet   2  
 amLODIPine (NORVASC) 5 mg tablet Take 10 mg by mouth daily.  ezetimibe (ZETIA) 10 mg tablet Take  by mouth. Allergies Allergen Reactions  Darvocet A500 [Propoxyphene N-Acetaminophen] Other (comments)  
  floaty/high feeling Objective: 
Visit Vitals /70 (BP 1 Location: Right arm, BP Patient Position: Sitting) Pulse 74 Temp 97.9 °F (36.6 °C) (Oral) Resp 19 Ht 5' 9\" (1.753 m) Wt 170 lb (77.1 kg) SpO2 98% BMI 25.10 kg/m² Physical Exam:  
General appearance - alert, well appearing, and in no distress Mental status - alert, oriented to person, place, and time EYE-JARED, EOMI, corneas normal, no foreign bodies ENT-ENT exam normal, no neck nodes or sinus tenderness Nose - normal and patent, no erythema, discharge or polyps Mouth - mucous membranes moist, pharynx normal without lesions Neck - supple, no significant adenopathy Chest - clear to auscultation, no wheezes, rales or rhonchi, symmetric air entry Heart - normal rate, regular rhythm, normal S1, S2, no murmurs, rubs, clicks or gallops Abdomen - soft, nontender, nondistended, no masses or organomegaly Lymph- no adenopathy palpable Ext-peripheral pulses normal, no pedal edema, no clubbing or cyanosis Skin-Warm and dry. no hyperpigmentation, vitiligo, or suspicious lesions Neuro -alert, oriented, normal speech, no focal findings or movement disorder noted Neck-normal C-spine, no tenderness, full ROM without pain Feet-no nail deformities or callus formation with good pulses noted Rt.knee-crepitations,medial joint line tenderness noted Results for orders placed or performed in visit on 12/04/18 AMB POC LIPID PROFILE Result Value Ref Range Cholesterol (POC) 155 Triglycerides (POC) 51 HDL Cholesterol (POC) 73 LDL Cholesterol (POC) 71 MG/DL Non-HDL Goal (POC) 81 TChol/HDL Ratio (POC) 2.1 Assessment/Plan: ICD-10-CM ICD-9-CM 1. Primary osteoarthritis of right knee M17.11 715.16   
2. Coronary artery disease involving native coronary artery of native heart without angina pectoris I25.10 414.01   
3. Primary osteoarthritis of right hip M16.11 715.15   
4. Hypercholesterolemia E78.00 272.0 5. Frequency of urination R35.0 788.41 No orders of the defined types were placed in this encounter. lose weight, follow low fat diet, follow low salt diet,Take 81mg aspirin daily Patient Instructions Primo Roundhart Activation Thank you for requesting access to [employer]. Please follow the instructions below to securely access and download your online medical record. [employer] allows you to send messages to your doctor, view your test results, renew your prescriptions, schedule appointments, and more. How Do I Sign Up? 1. In your internet browser, go to [email or web address] 
2. Click on the First Time User? Click Here link in the Sign In box. You will be redirect to the New Member Sign Up page. 3. Enter your Whats[employer]x Access Code exactly as it appears below.  You will not need to use this code after youve completed the sign-up process. If you do not sign up before the expiration date, you must request a new code. [employer] Access Code: [email or web address] Expires: 3/4/2019  9:13 AM (This is the date your [employer] access code will ) 4. Enter the last four digits of your Social Security Number (xxxx) and Date of Birth (mm/dd/yyyy) as indicated and click Submit. You will be taken to the next sign-up page. 5. Create a SecurActivet ID. This will be your Is[employer] Network login ID and cannot be changed, so think of one that is secure and easy to remember. 6. Create a Is[employer] Network password. You can change your password at any time. 7. Enter your Password Reset Question and Answer. This can be used at a later time if you forget your password. 8. Enter your e-mail address. You will receive e-mail notification when new information is available in [address] 9. Click Sign Up. You can now view and download portions of your medical record. 10. Click the Download Summary menu link to download a portable copy of your medical information. Additional Information If you have questions, please visit the Frequently Asked Questions section of the Is[employer] website at [email or web address] Remember, [employer] is NOT to be used for urgent needs. For medical emergencies, dial 911. Follow-up Disposition: 
Return in about 3 months (around 2019), or if symptoms worsen or fail to improve. I have reviewed with the patient details of the assessment and plan and all questions were answered. Relevent patient education was performed. The most recent lab findings were reviewed with the patient. An After Visit Summary was printed and given to the patient.

## 2019-01-07 NOTE — PROGRESS NOTES
1. Have you been to the ER, urgent care clinic since your last visit? Hospitalized since your last visit? NO 
 
2. Have you seen or consulted any other health care providers outside of the [address] [place] [address] since your last visit? Include any pap smears or colon screening. Jesusita Harris

## 2019-02-04 NOTE — PROGRESS NOTES
1. Have you been to the ER, urgent care clinic since your last visit? Hospitalized since your last visit?no    2. Have you seen or consulted any other health care providers outside of the [address] [place] [address] since your last visit? Include any pap smears or colon screening.  No    PHQ over the last two weeks 1/7/2019   PHQ Not Done -   Little interest or pleasure in doing things Not at all   Feeling down, depressed, irritable, or hopeless Not at all   Total Score PHQ 2 0       Chief Complaint   Patient presents with    Knee Pain     f/u

## 2019-02-04 NOTE — PROGRESS NOTES
Corine Wills is a 68 y.o. male and presents with Knee Pain (f/u)  . Subjective:    Knee pain Review:  Patient complaints of right knee pains continue but improved. Onset of the symptoms was months  ago. Inciting event: longstanding problem which has been getting worse. Current symptoms include pain location: both: medial and swelling. none,  Aggravating symptoms: going up and down stairs, walking, lateral movements, any weight bearing. Patient's overall course: gradually worsening Patient has had prior knee problems. Evaluation to date: x-ray-djd,  Treatment to date:NSAIDS/steroid injection        Hip Pain Review  Patient presents for evaluation of hip pains for a few day(s). Pain is located inright   hip,rated as a scale of 8/10  is described as aching, dull, and is intermittent . Associated symptoms include: decreased ROM. Related to injury:   no. The patient has tried NSAIDs for pain, with minimal relief. Coronary Disease Review:  Patient complains of no chest pain today. There has not been the need to use NTG. Previous cardiac testing has included: Electrocardiogram (EKG), Echocardiogram, CABG/Stent placement. Health maintenance suggests the needs for a colonoscopy      Allergic Rhinitis  Patient presents for evaluation of allergic symptoms. Symptoms include nasal congestion, rhinorrhea, sneezing, eye itching, watery eyes. Precipitants haved included possible pollen. Hypertension Review:  The patient has essential hypertension  Diet and Lifestyle: generally follows a  low sodium diet, exercises sporadically  Home BP Monitoring: is not measured at home. Pertinent ROS: taking medications as instructed, no medication side effects noted, no TIA's, no chest pain on exertion, no dyspnea on exertion, no swelling of ankles.        Review of Systems  Constitutional: negative for fevers, chills, anorexia and weight loss  Eyes:   negative for visual disturbance and irritation  ENT:   negative for tinnitus,sore throat,nasal congestion,ear pains. hoarseness  Respiratory:  negative for cough, hemoptysis, dyspnea,wheezing  CV:   negative for chest pain, palpitations, lower extremity edema  GI:   negative for nausea, vomiting, diarrhea, abdominal pain,melena  Endo:               negative for polyuria,polydipsia,polyphagia,heat intolerance  Genitourinary: negative for frequency, dysuria and hematuria  Integument:  negative for rash and pruritus  Hematologic:  negative for easy bruising and gum/nose bleeding  Musculoskel:  joint pain  Neurological:  negative for headaches, dizziness, vertigo, memory problems and gait   Behavl/Psych: negative for feelings of anxiety, depression, mood changes    Past Medical History:   Diagnosis Date    Anxiety state, unspecified 8/27/2013    Arthritis     CAD (coronary artery disease)     Cataract cortical, senile 8/27/2013    Heart failure ([facility] [address]     MI 1993    Hypertension     Hypertensive retinopathy 8/27/2013    Observation for other specified suspected conditions 8/27/2013    [facility]    Other ill-defined conditions(799.89)     chronic back pain    Other ill-defined conditions(799.89)     AGENT ORANGE EXPOSURE,     Psychiatric disorder     PTSD, NO MEDS    Stroke ([facility] [address]     TIA R HAND, RESOLVED    Unspecified adverse effect of anesthesia     SLOW TO WAKE, AGITATED WHEN HE WAKES    Unspecified reason for consultation 8/27/2013    Health maintenance     Past Surgical History:   Procedure Laterality Date    ABDOMEN SURGERY PROC UNLISTED      x2 hernia repairs    CARDIAC SURG PROCEDURE UNLIST      stent, cardiologist Dr. Rishabh Alexandra HX ORTHOPAEDIC Right     ROTATOR CUFF X2     Social History     Socioeconomic History    Marital status: SINGLE     Spouse name: Not on file    Number of children: Not on file    Years of education: Not on file    Highest education level: Not on file   Tobacco Use    Smoking status: Former Smoker Packs/day: 0.50     Years: 19.00     Pack years: 9.50    Smokeless tobacco: Former User     Quit date: 2/28/1984   Substance and Sexual Activity    Alcohol use: No    Drug use: No     Family History   Problem Relation Age of Onset    Cancer Mother     Heart Attack Mother     Cancer Father     Anesth Problems Neg Hx      Current Outpatient Medications   Medication Sig Dispense Refill    fluticasone (FLONASE) 50 mcg/actuation nasal spray USE 2 SPRAYS IN EACH NOSTRIL DAILY 48 g 3    irbesartan-hydroCHLOROthiazide (AVALIDE) 300-12.5 mg per tablet TK 1 T PO ONCE D  3    clotrimazole-betamethasone (LOTRISONE) topical cream APPLY TO AREA TWICE A  g 3    atorvastatin (LIPITOR) 80 mg tablet Take 1 Tab by mouth daily. 30 Tab 12    aspirin delayed-release 81 mg tablet   2    COREG CR 40 mg CR capsule   5    clopidogrel (PLAVIX) 75 mg tablet   5    NITROSTAT 0.4 mg SL tablet   3    potassium chloride (K-DUR, KLOR-CON) 20 mEq tablet   5    RANEXA 500 mg SR tablet   5    valsartan-hydrochlorothiazide (DIOVAN-HCT) 320-25 mg per tablet   2    amLODIPine (NORVASC) 5 mg tablet Take 10 mg by mouth daily.  ezetimibe (ZETIA) 10 mg tablet Take  by mouth.        Allergies   Allergen Reactions    Darvocet A500 [Propoxyphene N-Acetaminophen] Other (comments)     floaty/high feeling       Objective:  Visit Vitals  /70   Pulse 94   Temp 98.1 °F (36.7 °C) (Oral)   Resp 16   Ht 5' 9\" (1.753 m)   Wt 170 lb (77.1 kg)   SpO2 98%   BMI 25.10 kg/m²     Physical Exam:   General appearance - alert, well appearing, and in no distress  Mental status - alert, oriented to person, place, and time  EYE-JARED, EOMI, corneas normal, no foreign bodies  ENT-ENT exam normal, no neck nodes or sinus tenderness  Nose - normal and patent, no erythema, discharge or polyps  Mouth - mucous membranes moist, pharynx normal without lesions  Neck - supple, no significant adenopathy   Chest - clear to auscultation, no wheezes, rales or rhonchi, symmetric air entry   Heart - normal rate, regular rhythm, normal S1, S2, no murmurs, rubs, clicks or gallops   Abdomen - soft, nontender, nondistended, no masses or organomegaly  Lymph- no adenopathy palpable  Ext-peripheral pulses normal, no pedal edema, no clubbing or cyanosis  Skin-Warm and dry. no hyperpigmentation, vitiligo, or suspicious lesions  Neuro -alert, oriented, normal speech, no focal findings or movement disorder noted  Neck-normal C-spine, no tenderness, full ROM without pain  Feet-no nail deformities or callus formation with good pulses noted  Rt.knee-crepitations,medial joint line tenderness noted        Results for orders placed or performed in visit on 01/07/19   PSA, DIAGNOSTIC (PROSTATE SPECIFIC AG)   Result Value Ref Range    Prostate Specific Ag 3.7 0.0 - 4.0 ng/mL       Assessment/Plan:    ICD-10-CM ICD-9-CM    1. Screen for colon cancer Z12.11 V76.51 fluticasone (FLONASE) 50 mcg/actuation nasal spray     Orders Placed This Encounter    fluticasone (FLONASE) 50 mcg/actuation nasal spray     Sig: USE 2 SPRAYS IN EACH NOSTRIL DAILY     Dispense:  48 g     Refill:  3     lose weight, follow low fat diet, follow low salt diet,Take 81mg aspirin daily      Patient Instructions   [employer] Activation    Thank you for requesting access to [employer]. Please follow the instructions below to securely access and download your online medical record. [employer] allows you to send messages to your doctor, view your test results, renew your prescriptions, schedule appointments, and more. How Do I Sign Up? 1. In your internet browser, go to [email or web address]  2. Click on the First Time User? Click Here link in the Sign In box. You will be redirect to the New Member Sign Up page. 3. Enter your [employer] Access Code exactly as it appears below. You will not need to use this code after youve completed the sign-up process.  If you do not sign up before the expiration date, you must request a new code.    [facility] Access Code: [email or web address]  Expires: 3/21/2019 10:29 AM (This is the date your [facility] access code will )    4. Enter the last four digits of your Social Security Number (xxxx) and Date of Birth (mm/dd/yyyy) as indicated and click Submit. You will be taken to the next sign-up page. 5. Create a Nambiit ID. This will be your [facility] login ID and cannot be changed, so think of one that is secure and easy to remember. 6. Create a [facility] password. You can change your password at any time. 7. Enter your Password Reset Question and Answer. This can be used at a later time if you forget your password. 8. Enter your e-mail address. You will receive e-mail notification when new information is available in [address] 9. Click Sign Up. You can now view and download portions of your medical record. 10. Click the Download Summary menu link to download a portable copy of your medical information. Additional Information    If you have questions, please visit the Frequently Asked Questions section of the [facility] website at [email or web address] Remember, [facility] is NOT to be used for urgent needs. For medical emergencies, dial 911. Follow-up Disposition:  Return in about 3 months (around 2019), or if symptoms worsen or fail to improve. I have reviewed with the patient details of the assessment and plan and all questions were answered. Relevent patient education was performed. The most recent lab findings were reviewed with the patient. An After Visit Summary was printed and given to the patient.

## 2019-02-04 NOTE — PATIENT INSTRUCTIONS
[employer] Activation    Thank you for requesting access to [employer]. Please follow the instructions below to securely access and download your online medical record. [employer] allows you to send messages to your doctor, view your test results, renew your prescriptions, schedule appointments, and more. How Do I Sign Up? 1. In your internet browser, go to [email or web address]  2. Click on the First Time User? Click Here link in the Sign In box. You will be redirect to the New Member Sign Up page. 3. Enter your [employer] Access Code exactly as it appears below. You will not need to use this code after youve completed the sign-up process. If you do not sign up before the expiration date, you must request a new code. [employer] Access Code: [email or web address]  Expires: 3/21/2019 10:29 AM (This is the date your [employer] access code will )    4. Enter the last four digits of your Social Security Number (xxxx) and Date of Birth (mm/dd/yyyy) as indicated and click Submit. You will be taken to the next sign-up page. 5. Create a [employer] ID. This will be your [employer] login ID and cannot be changed, so think of one that is secure and easy to remember. 6. Create a [employer] password. You can change your password at any time. 7. Enter your Password Reset Question and Answer. This can be used at a later time if you forget your password. 8. Enter your e-mail address. You will receive e-mail notification when new information is available in [address] 9. Click Sign Up. You can now view and download portions of your medical record. 10. Click the Download Summary menu link to download a portable copy of your medical information. Additional Information    If you have questions, please visit the Frequently Asked Questions section of the [employer] website at [email or web address]. [email or web address] Remember, [employer] is NOT to be used for urgent needs. For medical emergencies, dial 911.

## 2019-05-06 NOTE — PROGRESS NOTES
1. Have you been to the ER, urgent care clinic since your last visit? Hospitalized since your last visit?no    2. Have you seen or consulted any other health care providers outside of the [address] [place] [address] since your last visit? Include any pap smears or colon screening.  No    3 most recent PHQ Screens 1/7/2019   PHQ Not Done -   Little interest or pleasure in doing things Not at all   Feeling down, depressed, irritable, or hopeless Not at all   Total Score PHQ 2 0

## 2019-05-06 NOTE — PROGRESS NOTES
Yuki Carson is a 68 y.o. male and presents with Coronary Artery Disease and Osteoarthritis  . Subjective:    Knee pain Review:  Patient complaints of right knee pains continue but improved. Onset of the symptoms was months  ago. Inciting event: longstanding problem which has been getting worse. Current symptoms include pain location: both: medial and swelling. none,  Aggravating symptoms: going up and down stairs, walking, lateral movements, any weight bearing. Patient's overall course: gradually worsening Patient has had prior knee problems. Evaluation to date: x-ray-djd,  Treatment to date:NSAIDS/steroid injection,he states he needs a screen                   Coronary Disease Review:  Patient complains of no chest pain today. There has not been the need to use NTG. Previous cardiac testing has included: Electrocardiogram (EKG), Echocardiogram, CABG/Stent placement. Allergic Rhinitis  Patient presents for evaluation of allergic symptoms. Symptoms include nasal congestion, rhinorrhea, sneezing, eye itching, watery eyes. Precipitants haved included possible pollen. Hypertension Review:  The patient has essential hypertension  Diet and Lifestyle: generally follows a  low sodium diet, exercises sporadically  Home BP Monitoring: is not measured at home. Pertinent ROS: taking medications as instructed, no medication side effects noted, no TIA's, no chest pain on exertion, no dyspnea on exertion, no swelling of ankles. Review of Systems  Constitutional: negative for fevers, chills, anorexia and weight loss  Eyes:   negative for visual disturbance and irritation  ENT:   negative for tinnitus,sore throat,nasal congestion,ear pains. hoarseness  Respiratory:  negative for cough, hemoptysis, dyspnea,wheezing  CV:   negative for chest pain, palpitations, lower extremity edema  GI:   negative for nausea, vomiting, diarrhea, abdominal pain,melena  Endo:               negative for polyuria,polydipsia,polyphagia,heat intolerance  Genitourinary: negative for frequency, dysuria and hematuria  Integument:  negative for rash and pruritus  Hematologic:  negative for easy bruising and gum/nose bleeding  Musculoskel:  joint pain  Neurological:  negative for headaches, dizziness, vertigo, memory problems and gait   Behavl/Psych: negative for feelings of anxiety, depression, mood changes    Past Medical History:   Diagnosis Date    Anxiety state, unspecified 8/27/2013    Arthritis     CAD (coronary artery disease)     Cataract cortical, senile 8/27/2013    Heart failure ([facility] [address]     MI 1993    Hypertension     Hypertensive retinopathy 8/27/2013    Observation for other specified suspected conditions 8/27/2013    [facility]    Other ill-defined conditions(799.89)     chronic back pain    Other ill-defined conditions(799.89)     AGENT ORANGE EXPOSURE,     Psychiatric disorder     PTSD, NO MEDS    Stroke ([facility] [address]     TIA R HAND, RESOLVED    Unspecified adverse effect of anesthesia     SLOW TO WAKE, AGITATED WHEN HE WAKES    Unspecified reason for consultation 8/27/2013    Health maintenance     Past Surgical History:   Procedure Laterality Date    ABDOMEN SURGERY PROC UNLISTED      x2 hernia repairs    CARDIAC SURG PROCEDURE UNLIST      stent, cardiologist Dr. Nirali Mckeon HX ORTHOPAEDIC Right     ROTATOR CUFF X2     Social History     Socioeconomic History    Marital status: SINGLE     Spouse name: Not on file    Number of children: Not on file    Years of education: Not on file    Highest education level: Not on file   Tobacco Use    Smoking status: Former Smoker     Packs/day: 0.50     Years: 19.00     Pack years: 9.50    Smokeless tobacco: Former User     Quit date: 2/28/1984   Substance and Sexual Activity    Alcohol use: No    Drug use: No     Family History   Problem Relation Age of Onset    Cancer Mother     Heart Attack Mother     Cancer Father    BoubacarKathy Anesth Problems Neg Hx      Current Outpatient Medications   Medication Sig Dispense Refill    diclofenac (VOLTAREN) 1 % gel Apply  to affected area four (4) times daily. 100 g 12    fluticasone (FLONASE) 50 mcg/actuation nasal spray USE 2 SPRAYS IN EACH NOSTRIL DAILY 48 g 3    irbesartan-hydroCHLOROthiazide (AVALIDE) 300-12.5 mg per tablet TK 1 T PO ONCE D  3    clotrimazole-betamethasone (LOTRISONE) topical cream APPLY TO AREA TWICE A  g 3    atorvastatin (LIPITOR) 80 mg tablet Take 1 Tab by mouth daily. 30 Tab 12    aspirin delayed-release 81 mg tablet   2    COREG CR 40 mg CR capsule   5    clopidogrel (PLAVIX) 75 mg tablet   5    NITROSTAT 0.4 mg SL tablet   3    potassium chloride (K-DUR, KLOR-CON) 20 mEq tablet   5    RANEXA 500 mg SR tablet   5    amLODIPine (NORVASC) 5 mg tablet Take 10 mg by mouth daily.  ezetimibe (ZETIA) 10 mg tablet Take  by mouth.       valsartan-hydrochlorothiazide (DIOVAN-HCT) 320-25 mg per tablet   2     Allergies   Allergen Reactions    Darvocet A500 [Propoxyphene N-Acetaminophen] Other (comments)     floaty/high feeling       Objective:  Visit Vitals  /70 (BP 1 Location: Right arm, BP Patient Position: Sitting)   Pulse 81   Temp 97.1 °F (36.2 °C) (Oral)   Resp 20   Ht 5' 9\" (1.753 m)   Wt 174 lb (78.9 kg)   SpO2 98%   BMI 25.70 kg/m²     Physical Exam:   General appearance - alert, well appearing, and in no distress  Mental status - alert, oriented to person, place, and time  EYE-JARED, EOMI, corneas normal, no foreign bodies  ENT-ENT exam normal, no neck nodes or sinus tenderness  Nose - normal and patent, no erythema, discharge or polyps  Mouth - mucous membranes moist, pharynx normal without lesions  Neck - supple, no significant adenopathy   Chest - clear to auscultation, no wheezes, rales or rhonchi, symmetric air entry   Heart - normal rate, regular rhythm, normal S1, S2, no murmurs, rubs, clicks or gallops   Abdomen - soft, nontender, nondistended, no masses or organomegaly  Lymph- no adenopathy palpable  Ext-peripheral pulses normal, no pedal edema, no clubbing or cyanosis  Skin-Warm and dry. no hyperpigmentation, vitiligo, or suspicious lesions  Neuro -alert, oriented, normal speech, no focal findings or movement disorder noted  Neck-normal C-spine, no tenderness, full ROM without pain  Feet-no nail deformities or callus formation with good pulses noted  Rt.knee-crepitations,medial joint line tenderness noted        Results for orders placed or performed in visit on 01/07/19   PSA, DIAGNOSTIC (PROSTATE SPECIFIC AG)   Result Value Ref Range    Prostate Specific Ag 3.7 0.0 - 4.0 ng/mL       Assessment/Plan:    ICD-10-CM ICD-9-CM    1. Primary osteoarthritis of right knee M17.11 715.16    2. Coronary artery disease involving native coronary artery of native heart without angina pectoris I25.10 414.01    3. HTN (hypertension), benign I10 401.1 CBC W/O DIFF      METABOLIC PANEL, COMPREHENSIVE   4. Hypercholesteremia E78.00 272.0 AMB POC LIPID PROFILE     Orders Placed This Encounter    CBC W/O DIFF    METABOLIC PANEL, COMPREHENSIVE    AMB POC LIPID PROFILE    diclofenac (VOLTAREN) 1 % gel     Sig: Apply  to affected area four (4) times daily. Dispense:  100 g     Refill:  12     lose weight, follow low fat diet, follow low salt diet,Take 81mg aspirin daily      Patient Instructions   [employer] Activation    Thank you for requesting access to [employer]. Please follow the instructions below to securely access and download your online medical record. [facility] allows you to send messages to your doctor, view your test results, renew your prescriptions, schedule appointments, and more. How Do I Sign Up? 1. In your internet browser, go to [email or web address]  2. Click on the First Time User? Click Here link in the Sign In box. You will be redirect to the New Member Sign Up page. 3. Enter your [employer] Access Code exactly as it appears below. You will not need to use this code after youve completed the sign-up process. If you do not sign up before the expiration date, you must request a new code. [employer] Access Code: [email or web address]  Expires: 2019 10:04 AM (This is the date your [employer] access code will )    4. Enter the last four digits of your Social Security Number (xxxx) and Date of Birth (mm/dd/yyyy) as indicated and click Submit. You will be taken to the next sign-up page. 5. Create a AxoGent ID. This will be your [employer] login ID and cannot be changed, so think of one that is secure and easy to remember. 6. Create a [employer] password. You can change your password at any time. 7. Enter your Password Reset Question and Answer. This can be used at a later time if you forget your password. 8. Enter your e-mail address. You will receive e-mail notification when new information is available in [address] 9. Click Sign Up. You can now view and download portions of your medical record. 10. Click the Download Summary menu link to download a portable copy of your medical information. Additional Information    If you have questions, please visit the Frequently Asked Questions section of the [employer] website at [email or web address] Remember, [employer] is NOT to be used for urgent needs. For medical emergencies, dial 911. Follow-up and Dispositions    · Return in about 3 months (around 2019), or if symptoms worsen or fail to improve. I have reviewed with the patient details of the assessment and plan and all questions were answered. Relevent patient education was performed. The most recent lab findings were reviewed with the patient. An After Visit Summary was printed and given to the patient.

## 2019-05-06 NOTE — PATIENT INSTRUCTIONS
Ampex Activation    Thank you for requesting access to [employer]. Please follow the instructions below to securely access and download your online medical record. Ampex allows you to send messages to your doctor, view your test results, renew your prescriptions, schedule appointments, and more. How Do I Sign Up? 1. In your internet browser, go to [email or web address]  2. Click on the First Time User? Click Here link in the Sign In box. You will be redirect to the New Member Sign Up page. 3. Enter your Ampex Access Code exactly as it appears below. You will not need to use this code after youve completed the sign-up process. If you do not sign up before the expiration date, you must request a new code. Ampex Access Code: [email or web address]  Expires: 2019 10:04 AM (This is the date your Ampex access code will )    4. Enter the last four digits of your Social Security Number (xxxx) and Date of Birth (mm/dd/yyyy) as indicated and click Submit. You will be taken to the next sign-up page. 5. Create a Ampex ID. This will be your Ampex login ID and cannot be changed, so think of one that is secure and easy to remember. 6. Create a Ampex password. You can change your password at any time. 7. Enter your Password Reset Question and Answer. This can be used at a later time if you forget your password. 8. Enter your e-mail address. You will receive e-mail notification when new information is available in [address]. 9. Click Sign Up. You can now view and download portions of your medical record. 10. Click the Download Summary menu link to download a portable copy of your medical information. Additional Information    If you have questions, please visit the Frequently Asked Questions section of the Ampex website at [email or web address] Remember, Ampex is NOT to be used for urgent needs. For medical emergencies, dial 911.

## 2019-05-16 PROBLEM — G45.9 TIA (TRANSIENT ISCHEMIC ATTACK): Status: ACTIVE | Noted: 2019-01-01

## 2019-05-16 NOTE — CONSULTS
NEUROLOGY NOTE Chief Complaint Patient presents with  Dizziness  Vomiting Reason for Consult I have been asked to see the patient in neurological consultation by Daniel Montenegro MD to render advice and opinion regarding possible stroke HPI Barry Veronica is a 68 y.o. male who presents to the hospital because of vertigo and imbalance. Patient states that symptoms started on 5/15/2019 at around 8 PM.  He was going upstairs when he started feeling imbalance. He was also vomiting as well. Symptoms did not improve and hence he came to the hospital.  He did not have any numbness or weakness of the upper or lower extremities. He denies any dysarthria. When he is laying down he is not having any symptoms and it comes on only when he sits up and walks. Initial CT scan of the head was normal. 
 
ROS A ten system review of constitutional, cardiovascular, respiratory, musculoskeletal, endocrine, skin, SHEENT, genitourinary, psychiatric and neurologic systems was obtained and is unremarkable except as stated in HPI  
 
PMH Past Medical History:  
Diagnosis Date  Anxiety state, unspecified 8/27/2013  Arthritis  CAD (coronary artery disease)  Cataract cortical, senile 8/27/2013  Heart failure ([address] [place] 1993  Hypertension  Hypertensive retinopathy 8/27/2013  Observation for other specified suspected conditions [facility]/27/2013 [facility]  Other ill-defined conditions(799.89) chronic back pain  Other ill-defined conditions(799.89) AGENT ORANGE EXPOSURE,   
 Psychiatric disorder PTSD, NO MEDS  Stroke ([address] TIA R HAND, RESOLVED  Unspecified adverse effect of anesthesia SLOW TO WAKE, AGITATED WHEN HE WAKES  
 Unspecified reason for consultation 8/27/2013 Health maintenance [facility] Family History Problem Relation Age of Onset  Cancer Mother  Heart Attack Mother  Cancer Father  Anesth Problems Neg Hx 32 Karen Glover Social History Socioeconomic History  Marital status: SINGLE Spouse name: Not on file  Number of children: Not on file  Years of education: Not on file  Highest education level: Not on file Tobacco Use  Smoking status: Former Smoker Packs/day: 0.50 Years: 19.00 Pack years: 9.50  Smokeless tobacco: Former User Quit date: 2/28/1984 Substance and Sexual Activity  Alcohol use: No  
 Drug use: No  
 
 
ALLERGIES Allergies Allergen Reactions  Darvocet A500 [Propoxyphene N-Acetaminophen] Other (comments)  
  floaty/high feeling PHYSICAL EXAMINATION:  
Patient Vitals for the past 24 hrs: 
 Temp Pulse Resp BP SpO2  
05/16/19 1230    116/62 94 % 05/16/19 1200    108/66 97 % 05/16/19 1130    118/59 96 % 05/16/19 1057  72  115/69   
05/16/19 0940    127/62   
05/16/19 0852 98.5 °F (36.9 °C) (!) 109 17 (!) 156/94 95 % General:  
General appearance: Pt is in no acute distress Distal pulses are preserved Fundoscopic exam: attempted Neurological Examination:  
Mental Status:  AAO x3. Speech is fluent. Follows commands, has normal fund of knowledge, attention, short term recall, comprehension and insight. Cranial Nerves: Visual fields are full. PERRL, Extraocular movements are full. Facial sensation intact. Facial movement intact. Hearing intact to conversation. Palate elevates symmetrically. Shoulder shrug symmetric. Tongue midline. Motor: Strength is 5/5 in all 4 ext. Normal tone. No atrophy. Sensation: Normal to light touch Reflexes: DTRs 2+ throughout. Plantar responses downgoing. Coordination/Cerebellar: Intact to finger-nose-finger Gait: Deferred Skin: No significant bruising or lacerations. LAB DATA REVIEWED:   
Recent Results (from the past 24 hour(s)) CBC WITH AUTOMATED DIFF Collection Time: 05/16/19  9:30 AM  
Result Value Ref Range WBC 5.3 4.1 - 11.1 K/uL  
 RBC 4.82 4.10 - 5.70 M/uL  
 HGB 12.7 12.1 - 17.0 g/dL HCT 41.3 36.6 - 50.3 % MCV 85.7 80.0 - 99.0 FL  
 MCH 26.3 26.0 - 34.0 PG  
 MCHC 30.8 30.0 - 36.5 g/dL  
 RDW 12.7 11.5 - 14.5 % PLATELET 826 813 - 408 K/uL MPV 9.7 8.9 - 12.9 FL  
 NRBC 0.0 0  WBC ABSOLUTE NRBC 0.00 0.00 - 0.01 K/uL NEUTROPHILS 78 (H) 32 - 75 % LYMPHOCYTES 12 12 - 49 % MONOCYTES 9 5 - 13 % EOSINOPHILS 0 0 - 7 % BASOPHILS 1 0 - 1 % IMMATURE GRANULOCYTES 0 0.0 - 0.5 % ABS. NEUTROPHILS 4.1 1.8 - 8.0 K/UL  
 ABS. LYMPHOCYTES 0.6 (L) 0.8 - 3.5 K/UL  
 ABS. MONOCYTES 0.5 0.0 - 1.0 K/UL  
 ABS. EOSINOPHILS 0.0 0.0 - 0.4 K/UL  
 ABS. BASOPHILS 0.1 0.0 - 0.1 K/UL  
 ABS. IMM. GRANS. 0.0 0.00 - 0.04 K/UL  
 DF SMEAR SCANNED    
 RBC COMMENTS NORMOCYTIC, NORMOCHROMIC    
 WBC COMMENTS REACTIVE LYMPHS    
METABOLIC PANEL, COMPREHENSIVE Collection Time: 05/16/19  9:30 AM  
Result Value Ref Range Sodium 140 136 - 145 mmol/L Potassium 3.6 3.5 - 5.1 mmol/L Chloride 105 97 - 108 mmol/L  
 CO2 32 21 - 32 mmol/L Anion gap 3 (L) 5 - 15 mmol/L Glucose 113 (H) 65 - 100 mg/dL BUN 10 6 - 20 MG/DL Creatinine 1.04 0.70 - 1.30 MG/DL  
 BUN/Creatinine ratio 10 (L) 12 - 20 GFR est AA >60 >60 ml/min/1.73m2 GFR est non-AA >60 >60 ml/min/1.73m2 Calcium 8.1 (L) 8.5 - 10.1 MG/DL Bilirubin, total 1.3 (H) 0.2 - 1.0 MG/DL  
 ALT (SGPT) 13 12 - 78 U/L  
 AST (SGOT) 16 15 - 37 U/L Alk. phosphatase 76 45 - 117 U/L Protein, total 6.6 6.4 - 8.2 g/dL Albumin 3.3 (L) 3.5 - 5.0 g/dL Globulin 3.3 2.0 - 4.0 g/dL A-G Ratio 1.0 (L) 1.1 - 2.2 PROTHROMBIN TIME + INR Collection Time: 05/16/19  9:30 AM  
Result Value Ref Range INR 1.1 0.9 - 1.1 Prothrombin time 10.9 9.0 - 11.1 sec GLUCOSE, POC Collection Time: 05/16/19  9:40 AM  
Result Value Ref Range Glucose (POC) 70 65 - 100 mg/dL Performed by Roney Ojeda (ANDRÉS) EKG, 12 LEAD, INITIAL Collection Time: 19 10:06 AM  
Result Value Ref Range Ventricular Rate 81 BPM  
 Atrial Rate 81 BPM  
 P-R Interval 170 ms QRS Duration 84 ms Q-T Interval 392 ms QTC Calculation (Bezet) 455 ms Calculated P Axis 65 degrees Calculated R Axis -23 degrees Calculated T Axis 41 degrees Diagnosis Sinus rhythm with occasional premature ventricular complexes Possible Left atrial enlargement Anteroseptal infarct (cited on or before 2009) When compared with ECG of 13-DEC-2016 11:56, 
premature ventricular complexes are now present Confirmed by Héctor Tenorio ([id number]) on 2019 12:09:12 PM 
  
GLUCOSE, POC Collection Time: 19 12:16 PM  
Result Value Ref Range Glucose (POC) 80 65 - 100 mg/dL Performed by Tanvir Moscoso [facility] Prior to Admission Medications Prescriptions Last Dose Informant Patient Reported? Taking? NITROSTAT 0.4 mg SL tablet Not Taking at Unknown time Self Yes No  
Si.4 mg by SubLINGual route every five (5) minutes as needed for Chest Pain. amLODIPine (NORVASC) 10 mg tablet 2019 at Unknown time Self Yes Yes Sig: Take 10 mg by mouth daily. aspirin delayed-release 81 mg tablet 5/15/2019 at Unknown time Self Yes Yes Sig: Take 81 mg by mouth daily. atorvastatin (LIPITOR) 40 mg tablet 2019 at Unknown time Self Yes Yes Sig: Take 40 mg by mouth nightly. carvedilol (COREG CR) 80 mg CR capsule 2019 at Unknown time Self Yes Yes Sig: Take 80 mg by mouth daily (with breakfast). clopidogrel (PLAVIX) 75 mg tablet 2019 at Unknown time Self Yes Yes Sig: Take 75 mg by mouth daily. diclofenac (VOLTAREN) 1 % gel Not Taking at Unknown time Self No No  
Sig: Apply  to affected area four (4) times daily. ezetimibe (ZETIA) 10 mg tablet 2019 at Unknown time Self Yes Yes Sig: Take 10 mg by mouth daily. fluticasone propionate (FLONASE) 50 mcg/actuation nasal spray 2019 at Unknown time Self Yes Yes [place] by Both Nostrils route two (2) times daily as needed for Rhinitis. ibuprofen (MOTRIN) 600 mg tablet 5/15/2019 at Unknown time Self Yes Yes Sig: Take 600 mg by mouth every six (6) hours as needed for Pain. irbesartan-hydroCHLOROthiazide (AVALIDE) 300-12.5 mg per tablet 2019 at Unknown time Self Yes Yes Sig: Take 1 Tab by mouth daily. potassium chloride (K-DUR, KLOR-CON) 20 mEq tablet 2019 at Unknown time Self Yes Yes Sig: Take 20 mEq by mouth daily. Facility-Administered Medications: None CURRENT MEDS Current Facility-Administered Medications Medication Dose Route Frequency  ondansetron (ZOFRAN) 4 mg/2 mL injection  [START ON 2019] aspirin delayed-release tablet 81 mg  81 mg Oral DAILY  atorvastatin (LIPITOR) tablet 40 mg  40 mg Oral QHS  [START ON 2019] clopidogrel (PLAVIX) tablet 75 mg  75 mg Oral DAILY  [START ON 2019] ezetimibe (ZETIA) tablet 10 mg  10 mg Oral DAILY  [START ON 2019] potassium chloride (K-DUR, KLOR-CON) SR tablet 20 mEq  20 mEq Oral DAILY  sodium chloride (NS) flush 5-40 mL  5-40 mL IntraVENous Q8H  
 heparin (porcine) injection 5,000 Units  5,000 Units SubCUTAneous Q8H Stroke workup MRI Brain Pending CT Head/CTA head and neck No evidence for acute large vessel arterial occlusion. Moderate proximal short 
segment narrowing of the left vertebral artery which is patent distally. Moderate to severe cervical spondylosis Numerous bilateral thyroid nodules CT perfusion brain: No significant perfusion abnormality TTE:  
Pending Stroke labs: 
HgBA1c Lab Results Component Value Date/Time Hemoglobin A1c 5.8 2016 04:31 AM  
 
LDL Lab Results Component Value Date/Time  LDL, calculated 107 (H) 2016 04:31 AM  
 
 
IMPRESSION: 
Lazara Ames is a 68 y.o. male who presents with new onset vertigo that started on 5/15/2019. Symptoms are persisting. Patient has had 2 strokes in the past with the last one in 2016 with no residual symptoms. Nonfocal findings. Asymptomatic when he is laying down but gets symptoms when he stands up and walks. Suspect that this is peripheral vertigo but will get MRI of the brain because of his previous history of stroke and possibility of posterior circulation involvement. RECOMMENDATIONS: 
- MRI brain w/o C - Pending 
- TTE - Pending - Telemetry - Permissive HTN (SBP<220/<120) for 24 hrs from symptom onset and then BP goal is less than 140/90 
- Stroke labs (HbA1c, lipid panel) - Continue ASA 81 mg daily and Plavix 75 mg daily 
- Continue atorvastatin 40 mg daily  
- ST/OT/PT eval 
 
Thank you very much for this consultation.  
 
 
Christy Martinez MD 
Neurologist

## 2019-05-16 NOTE — ED NOTES
Patient passed dysphagia screening then given 4oz apple juice for BG of 70. Patient reports no hx of diabetes.

## 2019-05-16 NOTE — H&P
Hospitalist Admission NoteNAME: [place] Alert :  1945 MRN:  [id number] Date/Time:  2019 2:18 PM 
 
Patient PCP: Denise Sosa MD 
________________________________________________________________________ My assessment of this patient's clinical condition and my plan of care is as follows. Assessment / Plan: 
Dizziness with nausea and vomiting rule out central causes/TIA 
-He is at high risk given his previous cerebrovascular accident 
-Symptoms could also be related to severe cervical spondylosis 
-CT head shows no acute process but CTA is showing short segment narrowing of the left vertebral artery which is patent distally 
-Initiate TIA protocol 
-Check MRI of the brain, 2D echocardiogram 
Check hemoglobin A1c, TSH. Last LDL was 138. 
-Continue his home aspirin and Plavix, statin 
-Consult PT OT and speech therapy. [facility] neurology. Mild total hyperbilirubinemia 
-Total bilirubin is slightly elevated at 1.3.  Check direct bilirubin.  Check CMP in a.m. History of coronary artery disease status post stents in the past 
Dyslipidemia History of CVA Hypertension 
-Hold home antihypertensives to allow for permissive hypertension given suspicion for TIA 
-Resume home aspirin, Plavix, statin Code Status: full Surrogate Decision Maker: Sister Tamiko Pyle DVT Prophylaxis: heparin GI Prophylaxis: not indicated Baseline: From home independent Subjective: CHIEF COMPLAINT: Dizziness HISTORY OF PRESENT ILLNESS:    
This is a 29-year-old male with past medical history of CVA, CAD is coming to the hospital with chief complaints of dizziness and vomiting.  Patient reports being in his usual state of health until last night at around 8 PM when he started having dizziness and spinning sensation like as if the room is spinning around him. [facility] also felt lightheaded and unsteady.  He does not report any recent ear trauma or ear infection.  Does not report similar episodes in the past. Hortencia Mejias does not report any focal weakness, tingling, numbness, facial deviation or slurred speech.  Does not report any vision deficits.  Upon presenting to the emergency department here he was made to walk and was leaning to the left side which prompted ER physician to do a stroke work-up.  Patient also reported nausea along with 2 episodes of vomitings which are clear. Hortencia Mejias does not report any abdominal pain, diarrhea or constipation. On arrival to the hospital he was slightly tachycardic and blood pressure was 156/94.  CBC was within normal limits.  BMP was also normal.  He had a CT head which showed evidence of no acute process but CT angiogram showed moderate proximal short segment narrowing of the left vertebral artery which is patent distally with moderate to severe cervical spondylosis and bilateral thyroid nodules. We were asked to admit for work up and evaluation of the above problems. Past Medical History:  
Diagnosis Date  Anxiety state, unspecified 8/27/2013  Arthritis  CAD (coronary artery disease)  Cataract cortical, senile 8/27/2013  Heart failure ([address] [place] 1993  Hypertension  Hypertensive retinopathy 8/27/2013  Observation for other specified suspected conditions 8/27/2013 Observation of [facility] Patient  Other ill-defined conditions(799.89) chronic back pain  Other ill-defined conditions(799.89) AGENT ORANGE EXPOSURE,   
 Psychiatric disorder PTSD, NO MEDS  Stroke ([address] TIA R HAND, RESOLVED  Unspecified adverse effect of anesthesia SLOW TO WAKE, AGITATED WHEN HE WAKES  
 Unspecified reason for consultation 8/27/2013 Health maintenance Past Surgical History:  
Procedure Laterality Date  ABDOMEN SURGERY PROC UNLISTED    
 x2 hernia repairs  CARDIAC SURG PROCEDURE UNLIST    
 stent, cardiologist Dr. Lim   HX ORTHOPAEDIC Right ROTATOR CUFF X2 Social History Tobacco Use  Smoking status: Former Smoker Packs/day: 0.50 Years: 19.00 Pack years: 9.50  Smokeless tobacco: Former User Quit date: 2/28/1984 Substance Use Topics  Alcohol use: No  
  
 
Family History Problem Relation Age of Onset  Cancer Mother  Heart Attack Mother  Cancer Father  Anesth Problems Neg Hx Allergies Allergen Reactions  Darvocet A500 [Propoxyphene N-Acetaminophen] Other (comments)  
  floaty/high feeling Prior to Admission medications Medication Sig Start Date End Date Taking? Authorizing Provider  
amLODIPine (NORVASC) 10 mg tablet Take 10 mg by mouth daily. Yes Provider, Historical  
atorvastatin (LIPITOR) 40 mg tablet Take 40 mg by mouth nightly. Yes Provider, Historical  
fluticasone propionate (FLONASE) 50 mcg/actuation nasal spray 1 [place] by Both Nostrils route two (2) times daily as needed for Rhinitis. Yes Provider, Historical  
ibuprofen (MOTRIN) 600 mg tablet Take 600 mg by mouth every six (6) hours as needed for Pain. Yes Provider, Historical  
irbesartan-hydroCHLOROthiazide (AVALIDE) 300-12.5 mg per tablet Take 1 Tab by mouth daily. Yes Provider, Historical  
aspirin delayed-release 81 mg tablet Take 81 mg by mouth daily. 1/13/15  Yes Provider, Historical  
carvedilol (COREG CR) 80 mg CR capsule Take 80 mg by mouth daily (with breakfast). 1/14/15  Yes Provider, Historical  
clopidogrel (PLAVIX) 75 mg tablet Take 75 mg by mouth daily. 1/13/15  Yes Provider, Historical  
potassium chloride (K-DUR, KLOR-CON) 20 mEq tablet Take 20 mEq by mouth daily. 1/13/15  Yes Provider, Historical  
ezetimibe (ZETIA) 10 mg tablet Take 10 mg by mouth daily. Yes Provider, Historical  
diclofenac (VOLTAREN) 1 % gel Apply  to affected area four (4) times daily. 5/6/19   Dennis Castaneda MD  
NITROSTAT 0.4 mg SL tablet 0.4 mg by SubLINGual route every five (5) minutes as needed for Chest Pain.  1/13/15   Provider, Historical  
 
 
 REVIEW OF SYSTEMS:    
I am not able to complete the review of systems because: The patient is intubated and sedated The patient has altered mental status due to his acute medical problems The patient has baseline aphasia from prior stroke(s) The patient has baseline dementia and is not reliable historian The patient is in acute medical distress and unable to provide information Total of 12 systems reviewed as follows:   
   POSITIVE= underlined text  Negative = text not underlined General:  fever, chills, sweats, generalized weakness, weight loss/gain,  
   loss of appetite Eyes:    blurred vision, eye pain, loss of vision, double vision ENT:    rhinorrhea, pharyngitis Respiratory:   cough, sputum production, SOB, BRASHER, wheezing, pleuritic pain  
Cardiology:   chest pain, palpitations, orthopnea, PND, edema, syncope Gastrointestinal:  abdominal pain , N/V, diarrhea, dysphagia, constipation, bleeding Genitourinary:  frequency, urgency, dysuria, hematuria, incontinence Muskuloskeletal :  arthralgia, myalgia, back pain Hematology:  easy bruising, nose or gum bleeding, lymphadenopathy Dermatological: rash, ulceration, pruritis, color change / jaundice Endocrine:   hot flashes or polydipsia Neurological:  headache, dizziness, confusion, focal weakness, paresthesia, Speech difficulties, memory loss, gait difficulty Psychological: Feelings of anxiety, depression, agitation Objective: VITALS:   
Visit Vitals /62 Pulse 72 Temp 98.5 °F (36.9 °C) Resp 17 Ht 5' 9\" (1.753 m) Wt 72 kg (158 lb 11.7 oz) SpO2 94% BMI 23.44 kg/m² PHYSICAL EXAM: 
 
General:    Alert, cooperative, no distress, appears stated age. HEENT: Atraumatic, anicteric sclerae, pink conjunctivae No oral ulcers, mucosa moist 
Neck:  Supple, symmetrical,  thyroid: non tender Lungs:   Clear to auscultation bilaterally. No Wheezing or Rhonchi. No rales. Chest wall:  No tenderness  No Accessory muscle use. Heart:   Regular  rhythm,  No  murmur   No edema Abdomen:   Soft, non-tender. Not distended. Bowel sounds normal 
Extremities: No cyanosis. No clubbing,   
  Skin turgor normal, Capillary refill normal, Radial dial pulse 2+ Skin:     Not pale. Not Jaundiced  No rashes Psych:  Not anxious or agitated. Neurologic: EOMs intact. No facial asymmetry. No aphasia or slurred speech. Symmetrical strength, Sensation grossly intact. Alert and oriented X 4.  
 
_______________________________________________________________________ Care Plan discussed with: 
  Comments Patient y Family RN y   
Care Manager Consultant:     
_______________________________________________________________________ Expected  Disposition:  
Home with Family y HH/PT/OT/RN   
SNF/LTC   
MARY ANN   
________________________________________________________________________ TOTAL TIME:  60  Minutes Critical Care Provided     Minutes non procedure based Comments  
 y Reviewed previous records  
>50% of visit spent in counseling and coordination of care y Discussion with patient and/or family and questions answered 
  
 
________________________________________________________________________ Signed: Maddison Valero MD 
 
Procedures: see electronic medical records for all procedures/Xrays and details which were not copied into this note but were reviewed prior to creation of Plan. LAB DATA REVIEWED:   
Recent Results (from the past 24 hour(s)) CBC WITH AUTOMATED DIFF Collection Time: 05/16/19  9:30 AM  
Result Value Ref Range WBC 5.3 4.1 - 11.1 K/uL  
 RBC 4.82 4.10 - 5.70 M/uL  
 HGB 12.7 12.1 - 17.0 g/dL HCT 41.3 36.6 - 50.3 % MCV 85.7 80.0 - 99.0 FL  
 MCH 26.3 26.0 - 34.0 PG  
 MCHC 30.8 30.0 - 36.5 g/dL  
 RDW 12.7 11.5 - 14.5 % PLATELET 454 728 - 098 K/uL MPV 9.7 8.9 - 12.9 FL  
 NRBC 0.0 0  WBC ABSOLUTE NRBC 0.00 0.00 - 0.01 K/uL NEUTROPHILS 78 (H) 32 - 75 % LYMPHOCYTES 12 12 - 49 % MONOCYTES 9 5 - 13 % EOSINOPHILS 0 0 - 7 % BASOPHILS 1 0 - 1 % IMMATURE GRANULOCYTES 0 0.0 - 0.5 % ABS. NEUTROPHILS 4.1 1.8 - 8.0 K/UL  
 ABS. LYMPHOCYTES 0.6 (L) 0.8 - 3.5 K/UL  
 ABS. MONOCYTES 0.5 0.0 - 1.0 K/UL  
 ABS. EOSINOPHILS 0.0 0.0 - 0.4 K/UL  
 ABS. BASOPHILS 0.1 0.0 - 0.1 K/UL  
 ABS. IMM. GRANS. 0.0 0.00 - 0.04 K/UL  
 DF SMEAR SCANNED    
 RBC COMMENTS NORMOCYTIC, NORMOCHROMIC    
 WBC COMMENTS REACTIVE LYMPHS    
METABOLIC PANEL, COMPREHENSIVE Collection Time: 05/16/19  9:30 AM  
Result Value Ref Range Sodium 140 136 - 145 mmol/L Potassium 3.6 3.5 - 5.1 mmol/L Chloride 105 97 - 108 mmol/L  
 CO2 32 21 - 32 mmol/L Anion gap 3 (L) 5 - 15 mmol/L Glucose 113 (H) 65 - 100 mg/dL BUN 10 6 - 20 MG/DL Creatinine 1.04 0.70 - 1.30 MG/DL  
 BUN/Creatinine ratio 10 (L) 12 - 20 GFR est AA >60 >60 ml/min/1.73m2 GFR est non-AA >60 >60 ml/min/1.73m2 Calcium 8.1 (L) 8.5 - 10.1 MG/DL Bilirubin, total 1.3 (H) 0.2 - 1.0 MG/DL  
 ALT (SGPT) 13 12 - 78 U/L  
 AST (SGOT) 16 15 - 37 U/L Alk. phosphatase 76 45 - 117 U/L Protein, total 6.6 6.4 - 8.2 g/dL Albumin 3.3 (L) 3.5 - 5.0 g/dL Globulin 3.3 2.0 - 4.0 g/dL A-G Ratio 1.0 (L) 1.1 - 2.2 PROTHROMBIN TIME + INR Collection Time: 05/16/19  9:30 AM  
Result Value Ref Range INR 1.1 0.9 - 1.1 Prothrombin time 10.9 9.0 - 11.1 sec GLUCOSE, POC Collection Time: 05/16/19  9:40 AM  
Result Value Ref Range Glucose (POC) 70 65 - 100 mg/dL Performed by Hawk Tanner (ANDRÉS) EKG, 12 LEAD, INITIAL Collection Time: 05/16/19 10:06 AM  
Result Value Ref Range Ventricular Rate 81 BPM  
 Atrial Rate 81 BPM  
 P-R Interval 170 ms QRS Duration 84 ms Q-T Interval 392 ms QTC Calculation (Bezet) 455 ms Calculated P Axis 65 degrees Calculated R Axis -23 degrees Calculated T Axis 41 degrees Diagnosis Sinus rhythm with occasional premature ventricular complexes Possible Left atrial enlargement Anteroseptal infarct (cited on or before 18-FEB-2009) When compared with ECG of 13-DEC-2016 11:56, 
premature ventricular complexes are now present Confirmed by Hernan Kwan ([id number]) on 5/16/2019 12:09:12 PM 
  
GLUCOSE, POC Collection Time: 05/16/19 12:16 PM  
Result Value Ref Range Glucose (POC) 80 65 - 100 mg/dL Performed by Rick Holbrook

## 2019-05-16 NOTE — PROGRESS NOTES
Speech pathology Orders received, chart reviewed. Patent in with nausea and dizziness. He specifically denied changes in speech. No facial weakness. He passed STAND and is on a diet. Formal speech/swallow evaluation not indicated at this time. Will complete orders.  Aurora Riley M.S. CCC-SLP

## 2019-05-16 NOTE — ED PROVIDER NOTES
EMERGENCY DEPARTMENT HISTORY AND PHYSICAL EXAM 
 
 
Date: 5/16/2019 Patient Name: Rj Stewart History of Presenting Illness Chief Complaint Patient presents with  Dizziness  Vomiting History Provided By: Patient HPI: Rj Stewart, 68 y.o. male with PMHx significant for history of coronary artery disease, hypertension, retinopathy, stroke, on Plavix, here with dizziness and vertigo-like symptoms beginning approximately 8 PM last night. Patient relates he was in the basement and caring up laundry up 3 flights of stairs and started to feel lightheaded and dizzy. He proceeded to drive over to his significant other's house since that he did not have any problems with numbness or weakness in her his face arms or legs or having any difficulty with speech. His dizziness seemed to worsen and he felt nauseous and had a episode of vomiting prior to arriving in our emergency department for evaluation. Patient said he was having difficulty walking and felt weak and unsteady. Walking patient at the bedside he seemed to list to the left and so initiated code stroke process for further evaluation for possible ataxia. There are no other complaints, changes, or physical findings at this time. PCP: Rissa Sandoval., MD 
 
No current facility-administered medications on file prior to encounter. Current Outpatient Medications on File Prior to Encounter Medication Sig Dispense Refill  diclofenac (VOLTAREN) 1 % gel Apply  to affected area four (4) times daily. 100 g 12  
 fluticasone (FLONASE) 50 mcg/actuation nasal spray USE 2 SPRAYS IN EACH NOSTRIL DAILY 48 g 3  
 irbesartan-hydroCHLOROthiazide (AVALIDE) 300-12.5 mg per tablet TK 1 T PO ONCE D  3  
 clotrimazole-betamethasone (LOTRISONE) topical cream APPLY TO AREA TWICE A  g 3  
 atorvastatin (LIPITOR) 80 mg tablet Take 1 Tab by mouth daily.  30 Tab 12  
 aspirin delayed-release 81 mg tablet   2  
  COREG CR 40 mg CR capsule   5  clopidogrel (PLAVIX) 75 mg tablet   5  
 NITROSTAT 0.4 mg SL tablet   3  potassium chloride (K-DUR, KLOR-CON) 20 mEq tablet   5  
 RANEXA 500 mg SR tablet   5  
 valsartan-hydrochlorothiazide (DIOVAN-HCT) 320-25 mg per tablet   2  
 amLODIPine (NORVASC) 5 mg tablet Take 10 mg by mouth daily.  ezetimibe (ZETIA) 10 mg tablet Take  by mouth. Past History Past Medical History: 
Past Medical History:  
Diagnosis Date  Anxiety state, unspecified 8/27/2013  Arthritis  CAD (coronary artery disease)  Cataract cortical, senile 8/27/2013  Heart failure ([facility] [address] [place] 1993  Hypertension  Hypertensive retinopathy 8/27/2013  Observ[facility] for other specified suspected conditions [facility]  Other ill-defined conditions(799.89) chronic back pain  Other ill-defined conditions(799.89) AGENT ORANGE EXPOSURE,   
 Psychiatric disorder PTSD, NO MEDS  Stroke ([facility] [address] TIA R HAND, RESOLVED  Unspecified adverse effect of anesthesia SLOW TO WAKE, AGITATED WHEN HE WAKES  
 Unspecified reason for consultation 8/27/2013 Health maintenance Past Surgical History: 
Past Surgical History:  
Procedure Laterality Date  ABDOMEN SURGERY PROC UNLISTED    
 x2 hernia repairs  CARDIAC SURG PROCEDURE UNLIST    
 stent, cardiologist Dr. Yonas Fox  HX ORTHOPAEDIC Right ROTATOR CUFF X2 Family History: 
Family History Problem Relation Age of Onset  Cancer Mother  Heart Attack Mother  Cancer Father  Anesth Problems Neg Hx Social History: 
Social History Tobacco Use  Smoking status: Former Smoker Packs/day: 0.50 Years: 19.00 Pack years: 9.50  Smokeless tobacco: Former User Quit date: 2/28/1984 Substance Use Topics  Alcohol use: No  
 Drug use: No  
 
 
Allergies: Allergies Allergen Reactions  Darvocet A500 [Propoxyphene N-Acetaminophen] Other (comments)  
  floaty/high feeling Review of Systems Review of Systems Constitutional: Negative for activity change, appetite change, chills and fever. HENT: Negative for congestion, rhinorrhea and sore throat. Eyes: Negative. Respiratory: Negative for chest tightness and shortness of breath. Cardiovascular: Negative for chest pain and palpitations. Gastrointestinal: Positive for nausea and vomiting. Negative for abdominal distention, constipation and diarrhea. Endocrine: Negative. Genitourinary: Negative for difficulty urinating. Musculoskeletal: Positive for back pain and neck pain. Negative for neck stiffness. Neurological: Positive for dizziness, weakness and light-headedness. Negative for tremors, seizures, syncope, facial asymmetry, speech difficulty, numbness and headaches. Hematological: Does not bruise/bleed easily. All other systems reviewed and are negative. Physical Exam  
Physical Exam  
Vital signs and nursing notes reviewed CONSTITUTIONAL: Alert, in severe distress distress; well-developed; well-nourished. Actively vomiting. HEAD:  Normocephalic, atraumatic EYES: PERRL; EOM's intact. Nonicteric sclera ENTM: Nose: no rhinorrhea; Throat: no erythema or exudate, mucous membranes moist 
Neck:  Supple. trachea is midline. RESP: Chest clear, equal breath sounds. - W/R/R 
CV: S1 and S2 WNL; No murmurs, gallops or rubs. 2+ radial and DP pulses bilaterally. GI: non-distended, normal bowel sounds, abdomen soft and non-tender. No masses or organomegaly. : No costo-vertebral angle tenderness. BACK:  Non-tender, normal appearance UPPER EXT:  Normal inspection. no joint or soft tissue swelling LOWER EXT: No edema, no calf tenderness.  
NEURO: Alert and oriented x3, answers questions no facial droop no dysarthria, 5 out of 5 light touch sensation and strength intact in upper and lower extremes bilaterally. Patient lists slightly to the left when attempting to evaluate gait. SKIN: No rashes; Warm and dry nonjaundiced skin PSYCH: Normal mood, normal affect Diagnostic Study Results Labs - Recent Results (from the past 12 hour(s)) CBC WITH AUTOMATED DIFF Collection Time: 05/16/19  9:30 AM  
Result Value Ref Range WBC 5.3 4.1 - 11.1 K/uL  
 RBC 4.82 4.10 - 5.70 M/uL  
 HGB 12.7 12.1 - 17.0 g/dL HCT 41.3 36.6 - 50.3 % MCV 85.7 80.0 - 99.0 FL  
 MCH 26.3 26.0 - 34.0 PG  
 MCHC 30.8 30.0 - 36.5 g/dL  
 RDW 12.7 11.5 - 14.5 % PLATELET 788 858 - 175 K/uL MPV 9.7 8.9 - 12.9 FL  
 NRBC 0.0 0  WBC ABSOLUTE NRBC 0.00 0.00 - 0.01 K/uL NEUTROPHILS 78 (H) 32 - 75 % LYMPHOCYTES 12 12 - 49 % MONOCYTES 9 5 - 13 % EOSINOPHILS 0 0 - 7 % BASOPHILS 1 0 - 1 % IMMATURE GRANULOCYTES 0 0.0 - 0.5 % ABS. NEUTROPHILS 4.1 1.8 - 8.0 K/UL  
 ABS. LYMPHOCYTES 0.6 (L) 0.8 - 3.5 K/UL  
 ABS. MONOCYTES 0.5 0.0 - 1.0 K/UL  
 ABS. EOSINOPHILS 0.0 0.0 - 0.4 K/UL  
 ABS. BASOPHILS 0.1 0.0 - 0.1 K/UL  
 ABS. IMM. GRANS. 0.0 0.00 - 0.04 K/UL  
 DF SMEAR SCANNED    
 RBC COMMENTS NORMOCYTIC, NORMOCHROMIC    
 WBC COMMENTS REACTIVE LYMPHS    
METABOLIC PANEL, COMPREHENSIVE Collection Time: 05/16/19  9:30 AM  
Result Value Ref Range Sodium 140 136 - 145 mmol/L Potassium 3.6 3.5 - 5.1 mmol/L Chloride 105 97 - 108 mmol/L  
 CO2 32 21 - 32 mmol/L Anion gap 3 (L) 5 - 15 mmol/L Glucose 113 (H) 65 - 100 mg/dL BUN 10 6 - 20 MG/DL Creatinine 1.04 0.70 - 1.30 MG/DL  
 BUN/Creatinine ratio 10 (L) 12 - 20 GFR est AA >60 >60 ml/min/1.73m2 GFR est non-AA >60 >60 ml/min/1.73m2 Calcium 8.1 (L) 8.5 - 10.1 MG/DL Bilirubin, total 1.3 (H) 0.2 - 1.0 MG/DL  
 ALT (SGPT) 13 12 - 78 U/L  
 AST (SGOT) 16 15 - 37 U/L Alk. phosphatase 76 45 - 117 U/L Protein, total 6.6 6.4 - 8.2 g/dL Albumin 3.3 (L) 3.5 - 5.0 g/dL Globulin 3.3 2.0 - 4.0 g/dL A-G Ratio 1.0 (L) 1.1 - 2.2 PROTHROMBIN TIME + INR Collection Time: 05/16/19  9:30 AM  
Result Value Ref Range INR 1.1 0.9 - 1.1 Prothrombin time 10.9 9.0 - 11.1 sec GLUCOSE, POC Collection Time: 05/16/19  9:40 AM  
Result Value Ref Range Glucose (POC) 70 65 - 100 mg/dL Performed by Brittany Samano (SWETHAT) EKG, 12 LEAD, INITIAL Collection Time: 05/16/19 10:06 AM  
Result Value Ref Range Ventricular Rate 81 BPM  
 Atrial Rate 81 BPM  
 P-R Interval 170 ms QRS Duration 84 ms Q-T Interval 392 ms QTC Calculation (Bezet) 455 ms Calculated P Axis 65 degrees Calculated R Axis -23 degrees Calculated T Axis 41 degrees Diagnosis Sinus rhythm with occasional premature ventricular complexes Possible Left atrial enlargement Anteroseptal infarct (cited on or before 18-FEB-2009) When compared with ECG of 13-DEC-2016 11:56, 
premature ventricular complexes are now present Radiologic Studies -  
CTA CODE NEURO HEAD AND NECK W CONT Final Result Impression: No evidence for acute large vessel arterial occlusion. Moderate proximal short  
segment narrowing of the left vertebral artery which is patent distally. Moderate to severe cervical spondylosis Numerous bilateral thyroid nodules CT perfusion brain: No significant perfusion abnormality CT PERF W CBF Final Result Impression: No evidence for acute large vessel arterial occlusion. Moderate proximal short  
segment narrowing of the left vertebral artery which is patent distally. Moderate to severe cervical spondylosis Numerous bilateral thyroid nodules CT perfusion brain: No significant perfusion abnormality CT CODE NEURO HEAD WO CONTRAST Final Result IMPRESSION: No acute process or change compared to the prior exam. CT Results  (Last 48 hours) 05/16/19 0907  CTA CODE NEURO HEAD AND NECK W CONT Final result Impression:  Impression: No evidence for acute large vessel arterial occlusion. Moderate proximal short  
segment narrowing of the left vertebral artery which is patent distally. Moderate to severe cervical spondylosis Numerous bilateral thyroid nodules CT perfusion brain: No significant perfusion abnormality Narrative:  CLINICAL HISTORY: Acute ataxia EXAMINATION:  CT ANGIOGRAPHY HEAD AND NECK COMPARISON: CT head 5/16/2019, MR brain 12/13/2016 TECHNIQUE:  Following the uneventful administration of iodinated contrast  
material, axial CT angiography of the head and neck was performed. Delayed axial  
images through the head were also obtained. Coronal and sagittal reconstructions  
were obtained. Manual postprocessing of images was performed. 3-D  Sagittal  
maximal intensity projection images were obtained. 3-D Coronal maximal  
intensity projections were obtained. CT dose reduction was achieved through use  
of a standardized protocol tailored for this examination and automatic exposure  
control for dose modulation. CT perfusion analysis was performed using CT with  
contrast administration, including postprocessing of parametric maps with the  
determination of cerebral blood flow, cerebral blood volume, and mean transit  
time. FINDINGS:  
   
Delayed contrast-enhanced head CT: The ventricles are midline without hydrocephalus. There is no acute intra or  
extra-axial hemorrhage. Mild bilateral subcortical and periventricular areas of  
patchy low attenuation is nonspecific but likely related to changes of chronic  
small vessel ischemic disease. Chronic right superior parietal infarction. The  
basal cisterns are clear. Mild mucosal thickening in the left maxillary sinus. CTA NECK:  
   
Great vessels: Normal arch anatomy with the origins patent. Right subclavian artery: Patent Left subclavian artery: Patent Right common carotid artery: Patent Left common carotid artery: Patent Cervical right internal carotid artery: Patent with no significant stenosis by NASCET criteria. Cervical left internal carotid artery: Patent with no significant stenosis by NASCET criteria. Right vertebral artery: Patent and dominant Left vertebral artery: Patent with moderate proximal short segment narrowing Moderate to severe cervical spondylosis Numerous bilateral thyroid nodules CTA HEAD:  
   
Right cavernous internal carotid artery: Patent Left cavernous internal carotid artery: Patent Anterior cerebral arteries: Patent Anterior communicating artery: Patent Right middle cerebral artery: Patent Left middle cerebral artery: Patent Posterior communicating arteries: Patent Posterior cerebral arteries: Patent with mild to moderate right atherosclerosis Basilar artery: Patent Distal vertebral arteries: Patent CT perfusion brain: No significant perfusion abnormality 05/16/19 0907  CT PERF W CBF Final result Impression:  Impression: No evidence for acute large vessel arterial occlusion. Moderate proximal short  
segment narrowing of the left vertebral artery which is patent distally. Moderate to severe cervical spondylosis Numerous bilateral thyroid nodules CT perfusion brain: No significant perfusion abnormality Narrative:  CLINICAL HISTORY: Acute ataxia EXAMINATION:  CT ANGIOGRAPHY HEAD AND NECK COMPARISON: CT head 5/16/2019, MR brain 12/13/2016 TECHNIQUE:  Following the uneventful administration of iodinated contrast  
material, axial CT angiography of the head and neck was performed. Delayed axial  
images through the head were also obtained. Coronal and sagittal reconstructions  
were obtained. Manual postprocessing of images was performed.  3-D  Sagittal  
 maximal intensity projection images were obtained. 3-D Coronal maximal  
intensity projections were obtained. CT dose reduction was achieved through use  
of a standardized protocol tailored for this examination and automatic exposure  
control for dose modulation. CT perfusion analysis was performed using CT with  
contrast administration, including postprocessing of parametric maps with the  
determination of cerebral blood flow, cerebral blood volume, and mean transit  
time. FINDINGS:  
   
Delayed contrast-enhanced head CT: The ventricles are midline without hydrocephalus. There is no acute intra or  
extra-axial hemorrhage. Mild bilateral subcortical and periventricular areas of  
patchy low attenuation is nonspecific but likely related to changes of chronic  
small vessel ischemic disease. Chronic right superior parietal infarction. The  
basal cisterns are clear. Mild mucosal thickening in the left maxillary sinus. CTA NECK:  
   
Great vessels: Normal arch anatomy with the origins patent. Right subclavian artery: Patent Left subclavian artery: Patent Right common carotid artery: Patent Left common carotid artery: Patent Cervical right internal carotid artery: Patent with no significant stenosis by NASCET criteria. Cervical left internal carotid artery: Patent with no significant stenosis by NASCET criteria. Right vertebral artery: Patent and dominant Left vertebral artery: Patent with moderate proximal short segment narrowing Moderate to severe cervical spondylosis Numerous bilateral thyroid nodules CTA HEAD:  
   
Right cavernous internal carotid artery: Patent Left cavernous internal carotid artery: Patent Anterior cerebral arteries: Patent Anterior communicating artery: Patent Right middle cerebral artery: Patent Left middle cerebral artery: Patent Posterior communicating arteries: Patent Posterior cerebral arteries: Patent with mild to moderate right atherosclerosis Basilar artery: Patent Distal vertebral arteries: Patent CT perfusion brain: No significant perfusion abnormality 05/16/19 0907  CT CODE NEURO HEAD WO CONTRAST Final result Impression:  IMPRESSION: No acute process or change compared to the prior exam.  
   
   
  
 Narrative:  EXAM: CT CODE NEURO HEAD WO CONTRAST INDICATION: Ataxia onset approximately 8 PM last night COMPARISON: 12/13/2016. CONTRAST: None. TECHNIQUE: Unenhanced CT of the head was performed using 5 mm images. Brain and  
bone windows were generated. CT dose reduction was achieved through use of a  
standardized protocol tailored for this examination and automatic exposure  
control for dose modulation. FINDINGS:  
The ventricles and sulci are normal in size, shape and configuration and  
midline. Small vessel ischemic changes are stable compared to the prior exam.  
There is no intracranial hemorrhage, extra-axial collection, mass, mass effect  
or midline shift. The basilar cisterns are open. No acute infarct is  
identified. The bone windows demonstrate no abnormalities. The visualized  
portions of the paranasal sinuses and mastoid air cells are clear. CXR Results  (Last 48 hours) None Medical Decision Making I am the first provider for this patient. I reviewed the vital signs, available nursing notes, past medical history, past surgical history, family history and social history. Vital Signs-Reviewed the patient's vital signs. Patient Vitals for the past 12 hrs: 
 Temp Pulse Resp BP SpO2  
05/16/19 1057  72  115/69   
05/16/19 0940    127/62   
05/16/19 0852 98.5 °F (36.9 °C) (!) 109 17 (!) 156/94 95 % Pulse Oximetry Analysis -96% on room air Cardiac Monitor:  
Rate: 72 bpm 
Rhythm: Normal Sinus Rhythm EKG interpretation: (Preliminary) EKG performed at 10:06 AM shows a sinus rhythm at a rate of 80 normal intervals artifact in precordial leads V4 through V6 no visible acute ischemic changes. Records Reviewed: Nursing Notes and Old Medical Records Provider Notes (Medical Decision Making):  
45-year-old male with onset of vertigo-like symptoms at 8 PM yesterday, outside TPA window without evidence of occlusion on perfusion and CTA. Will attempt to improve symptoms. May not definitively be able to rule out with central versus peripheral cause and may require admission if cannot improve significantly during ED visit. No abdominal pain to suspect intra-abdominal cause of nausea and vomiting, more likely associated with today's presenting vertigo symptoms. ED Course:  
Initial assessment performed. The patients presenting problems have been discussed, and they are in agreement with the care plan formulated and outlined with them. I have encouraged them to ask questions as they arise throughout their visit. ED Course as of May 20 1253 Thu May 16, 2019  
1038 Spoke with telemetry neurologist who has had the opportunity review the CT angios perfusion scan and agrees with report of no occlusion. Patient being dose vertigo medication and IV fluids and attempt to resolve his ongoing symptoms. If unable to provide adequate resolution of his symptoms, will plan on admission. [TL] 1252 She continues to have dizziness and vertigo symptoms here. We will plan an admission for further evaluation for possible central cause. [TL] ED Course User Index [TL] Jay Yen MD  
 
 
Due to patient with continued vertigo symptoms, cannot definitively rule out central cause. Will plan on admission. Disposition: 
Admit. PLAN: 
1. Current Discharge Medication List  
  
 
2. Follow-up Information None Return to ED if worse Diagnosis Clinical Impression: 1. Vertigo 2. Nausea and vomiting in adult

## 2019-05-16 NOTE — PROGRESS NOTES
Pharmacy Clarification of Prior to Admission Medication Regimen The patient was interviewed regarding clarification of the prior to admission medication regimen. Significant other was present in room and obtained permission from patient to discuss drug regimen with visitor(s) present. Patient was questioned regarding use of any other inhalers, topical products, over the counter medications, herbal medications, vitamin products or ophthalmic/nasal/otic medication use. Patient stated he did not know the names of his medications. T called [facility], [phone], and spoke with Efraín Sims, pharmacist, who was able to verify the patient's refill history. T reinterviewed the patient after talking with the outpatient pharmacy. Information Obtained From: Patient, outpatient pharmacy, RX Query Pertinent Pharmacy Findings: 
Updated patient?s preferred outpatient pharmacy to: [facility] [id number] [facility] [address]  
NITROSTAT 0.4 mg SL tablet: Patient has this PRN agent but has not used it as of 19. diclofenac (VOLTAREN) 1 % gel: Patient stated he has not picked up this agent as of 19. Patient stated he has not taken his medications as of 19 due to a dental procedure on 19 PTA medication list was corrected to the following:  
 
Prior to Admission Medications Prescriptions Last Dose Informant Patient Reported? Taking? NITROSTAT 0.4 mg SL tablet Not Taking at Unknown time Self Yes No  
Si.4 mg by SubLINGual route every five (5) minutes as needed for Chest Pain. amLODIPine (NORVASC) 10 mg tablet 2019 at Unknown time Self Yes Yes Sig: Take 10 mg by mouth daily. aspirin delayed-release 81 mg tablet 5/15/2019 at Unknown time Self Yes Yes Sig: Take 81 mg by mouth daily. atorvastatin (LIPITOR) 40 mg tablet 2019 at Unknown time Self Yes Yes Sig: Take 40 mg by mouth nightly. carvedilol (COREG CR) 80 mg CR capsule 2019 at Unknown time Self Yes Yes Sig: Take 80 mg by mouth daily (with breakfast). clopidogrel (PLAVIX) 75 mg tablet 2019 at Unknown time Self Yes Yes Sig: Take 75 mg by mouth daily. diclofenac (VOLTAREN) 1 % gel Not Taking at Unknown time Self No No  
Sig: Apply  to affected area four (4) times daily. ezetimibe (ZETIA) 10 mg tablet 2019 at Unknown time Self Yes Yes Sig: Take 10 mg by mouth daily. fluticasone propionate (FLONASE) 50 mcg/actuation nasal spray 2019 at Unknown time Self Yes Yes Si [place] by Both Nostrils route two (2) times daily as needed for Rhinitis. ibuprofen (MOTRIN) 600 mg tablet 5/15/2019 at Unknown time Self Yes Yes Sig: Take 600 mg by mouth every six (6) hours as needed for Pain. irbesartan-hydroCHLOROthiazide (AVALIDE) 300-12.5 mg per tablet 2019 at Unknown time Self Yes Yes Sig: Take 1 Tab by mouth daily. potassium chloride (K-DUR, KLOR-CON) 20 mEq tablet 2019 at Unknown time Self Yes Yes Sig: Take 20 mEq by mouth daily. Facility-Administered Medications: None Thank you, 
Adriel Clarke CPhT Medication History Pharmacy Technician

## 2019-05-17 NOTE — PROGRESS NOTES
Problem: Mobility Impaired (Adult and Pediatric) Goal: *Acute Goals and Plan of Care (Insert Text) Description Physical Therapy Goals Initiated 5/17/2019 1. Patient will move from supine to sit and sit to supine , scoot up and down and roll side to side in bed with independence within 7 day(s). 2.  Patient will transfer from bed to chair and chair to bed with independence using the least restrictive device within 7 day(s). 3.  Patient will perform sit to stand with independence within 7 day(s). 4.  Patient will ambulate with independence for 150 feet with the least restrictive device within 7 day(s). 5.  Patient will ascend/descend 12 stairs with one sided handrail(s) with modified independence within 7 day(s). 6.  Patient will improve Krishnamurthy Balance score by 7 points within 7 days. Outcome: Not Progressing Towards Goal 
  
PHYSICAL THERAPY EVALUATION Patient: Vijay Bansal (47 y.o. male) Date: 5/17/2019 Primary Diagnosis: TIA (transient ischemic attack) [G45.9] TIA (transient ischemic attack) [G45.9] Precautions:   Fall ASSESSMENT Based on the objective data described below, the patient presents with impaired balance, unsteady gait, ataxia, and dizziness. Current Level of Function (mobility/balance, ADL): patient required CGA-mod A x 2 for ambulation with frequent LOB in all directions, L > R, anteriorly and posteriorly. Patient very ataxic with fluctuations in gait. Functional Outcome Measure: The patient scored 32/56 on Krishnamurthy Balance test outcome measure which is indicative of moderate fall risk. Other factors to consider for discharge: lives alone, very active, independent, balance, moderate fall risk Patient will benefit from skilled therapy intervention to address the above noted impairments. The current recommendation for discharge is IP rehab, in order for the patient to meet his/her long term goals.  
 
 
 
PLAN : 
 Recommendations and Planned Interventions: bed mobility training, transfer training, gait training, therapeutic exercises, neuromuscular re-education, patient and family training/education and therapeutic activities Frequency/Duration: Patient will be followed by physical therapy  5 times a week to address goals. SUBJECTIVE:  
Patient stated ? I have fallen before but I am very careful. ? OBJECTIVE DATA SUMMARY:  
HISTORY:   
Past Medical History:  
Diagnosis Date Anxiety state, unspecified 8/27/2013 Arthritis CAD (coronary artery disease) Cataract cortical, senile 8/27/2013 Heart failure ([facility] [address] [place] 1993 Hypertension Hypertensive retinopathy 8/27/2013 Observation for other specified suspected conditions [facility]27[facility] Other ill-defined conditions(799.89) chronic back pain Other ill-defined conditions(799.89) AGENT ORANGE EXPOSURE, Psychiatric disorder PTSD, NO MEDS Stroke [facility]) TIA R HAND, RESOLVED Unspecified adverse effect of anesthesia SLOW TO WAKE, AGITATED WHEN HE WAKES Unspecified reason for consultation 8/27/2013 Health maintenance Past Surgical History:  
Procedure Laterality Date ABDOMEN SURGERY PROC UNLISTED    
 x2 hernia repairs CARDIAC SURG PROCEDURE UNLIST    
 stent, cardiologist Dr. Toño Smith HX ORTHOPAEDIC Right ROTATOR CUFF X2 Prior Level of Function/Home Situation: patient lives alone although has family and friends close by. He is independent with all aspects of functional mobility and ADLS. He performs IADLS independently and drives. Reports a few falls in the last 3 years. Personal factors and/or comorbidities impacting plan of care: CVA workup, balance, lives alone Home Situation Home Environment: Private residence # Steps to Enter: 4 Rails to Enter: No(wall on R) One/Two Story Residence: Two story # of Interior Steps: 12 Interior Rails: Right Lift Chair Available: No 
Living Alone: Yes Support Systems: Family member(s)(and girlfriend ) Patient Expects to be Discharged to[de-identified] Private residence Current DME Used/Available at Home: Cane, straight Tub or Shower Type: Tub/Shower combination EXAMINATION/PRESENTATION/DECISION MAKING:  
Critical Behavior: 
Neurologic State: Alert Orientation Level: Oriented X4 Cognition: Follows commands Safety/Judgement: Awareness of environment, Fall prevention, Home safety Hearing: Auditory Auditory Impairment: None Skin:   
Edema:  
Range Of Motion: 
AROM: Within functional limits PROM: Within functional limits Strength:   
Strength: Within functional limits Tone & Sensation:  
Tone: Normal 
  
  
  
  
Sensation: Intact Coordination: 
Coordination: Within functional limits Vision:  
Tracking: Able to track stimulus in all quadrants w/o difficulty Visual Fields: (intact) Diplopia: No 
Acuity: (blurry right eye due lasix surgery, otherwise intact glasses) Corrective Lenses: Reading glasses Functional Mobility: 
Bed Mobility: 
Rolling: Independent Supine to Sit: Independent Scooting: Independent Transfers: 
Sit to Stand: Contact guard assistance;Stand-by assistance Stand to Sit: Stand-by assistance Bed to Chair: Contact guard assistance Balance:  
Sitting: Intact; Without support Standing: Impaired; Without support Standing - Static: Fair Standing - Dynamic : Fair;Poor Ambulation/Gait Training: 
Distance (ft): 100 Feet (ft) Assistive Device: Gait belt Ambulation - Level of Assistance: Contact guard assistance; Moderate assistance Gait Description (WDL): Exceptions to [facility] Gait Abnormalities: Ataxic;Decreased step clearance; Path deviations;Trunk sway increased Base of Support: Widened Speed/Veronika: Pace decreased (<100 feet/min); Fluctuations Step Length: Right shortened;Left shortened Stairs: Number of Stairs Trained: 12 
Stairs - Level of Assistance: Contact guard assistance Rail Use: Right Therapeutic Exercises:  
 
 
Functional Measure: 
Krishnamurthy Balance Test: 
 
Sitting to Standing: 3 Standing Unsupported: 3 Sitting with Back Unsupported: 4 Standing to Sitting: 3 Transfers: 3 Standing Unsupported with Eyes Closed: 3 Standing Unsupported with Feet Together: 1 Reach Forward with Outstretched Arm: 4  Object: 3 Turn to Look Over Shoulders: 3 Turn 360 Degrees: 0 Alternate Foot on Step/Stool: 0 Standing Unsupported One Foot in Front: 0 Stand on One Le Total: 32 
 
 
56=Maximum possible score;  
0-20=High fall risk 21-40=Moderate fall risk 41-56=Low fall risk Physical Therapy Evaluation Charge Determination History Examination Presentation Decision-Making MEDIUM  Complexity : 1-2 comorbidities / personal factors will impact the outcome/ POC  MEDIUM Complexity : 3 Standardized tests and measures addressing body structure, function, activity limitation and / or participation in recreation  MEDIUM Complexity : Evolving with changing characteristics  Other outcome measures Krishnamurthy  MEDIUM Based on the above components, the patient evaluation is determined to be of the following complexity level: MEDIUM Patient and/or family was verbally educated on the BE FAST acronym for signs/symptoms of CVA and TIA. BE FAST was written on patient's communication board  for visual education and reinforcement. All questions answered with patient indicating good understanding. Activity Tolerance:  
Good Please refer to the flowsheet for vital signs taken during this treatment. After treatment patient left:  
Up in chair Call light within reach RN notified Family at bedside COMMUNICATION/EDUCATION:  
The patient?s plan of care was discussed with: Occupational Therapist, Registered Nurse and . Fall prevention education was provided and the patient/caregiver indicated understanding., Patient/family have participated as able in goal setting and plan of care. and Patient/family agree to work toward stated goals and plan of care. Thank you for this referral. 
Hugo Ross, PT, DPT Time Calculation: 40 mins

## 2019-05-17 NOTE — PHYSICIAN ADVISORY
Letter of Status Determination:  
Recommend hospitalization status upgraded from OBSERVATION  to INPATIENT  Status Pt Name:  Gena Vieyra MR#  
KRYSTA # [id number] / 
[id number] Payor: Fanny Mcgovern / Plan: [address] / Product Type: Medicare /   
PARISA#  [id number] Room and Hospital  3121/01  @ [facility] Hospitalization date  5/16/2019  8:47 AM  
Current Attending Physician  Rodo Fam, DO  
Principal diagnosis  TIA (transient ischemic attack) [G45.9] Clinicals  68 y.o. y.o  male hospitalized with above diagnosis This pt has had history of previous strokes who presented with dizziness ,now confirmed to be an acute CVA of the vermis. His care is complex. On the basis of above clinical data, this patient's care in acute hospital care setting is expected to exceed two midnights. [facility] criteria Does  NOT apply STATUS DETERMINATION  This patient is at high risk of adverse events and deterioration based on documented clinical data, comorbid conditions and current acute care course. Mr. Gena Vieyra is expected to meet Inpatient Admission status criteria in accordance with CMS regulation Section 43 .3. Specifically, due to medical necessity the patient's stay is expected to exceed Two Midnights. It is our recommendation that this patient's hospitalization status should be upgraded from  OBSERVATION to INPATIENT status. The final decision of the patient's hospitalization status depends on the attending physician's judgment. Additional comments Payor: Fanny Mcgovern / Plan: [address] / Product Type: Medicare /   
  
 
Michel Bajwa MD MPH FACP Cell: [phone] Physician Advisor [address] [facility] [address] [facility]  
   
Cell  [phone]   
 
 
[phone] Satish Pinto

## 2019-05-17 NOTE — PROGRESS NOTES
Reason for Admission:   TIA 
                
RRAT Score: 12 Plan for utilizing home health: Pt is not expected to need HH at discharge. Pt was seen by PT/OT and IP Rehab was recommended. Current Advanced Directive/Advance Care Plan: No plan on file Likelihood of Readmission: low based on RRAT Transition of Care Plan: CM met with pt to discuss d/c planning needs. CM verified pt demographic, insurance, and PCP information. Pt resides alone in a 2 story home (2 stairs to entry). Pt has the support of friends and family that live nearby. Pt sister and friend at pt bedside. Pt verified his insurance. Pt is seen by PCP Dr. Vikram Villanueva and also by the [facility]. Plan is for pt to discharge to IP Rehab. Recommendation for rehab was discussed with pt. Pt agreeable to plan and would like [facility]. CM to send referral.  
 
CM will continue to follow patient for discharge planning needs and arrange for services as deemed necessary. Care Management Interventions PCP Verified by CM: Yes Mode of Transport at Discharge: Other (see comment) Transition of Care Consult (CM Consult): Discharge Planning Physical Therapy Consult: Yes Occupational Therapy Consult: Yes Current Support Network: Own Home, Family Lives [place] Confirm Follow Up Transport: Other (see comment) Plan discussed with Pt/Family/Caregiver: Yes Haider Mcneal, Care Manager [phone]

## 2019-05-17 NOTE — PROGRESS NOTES
Oral and Written notification given to patient and/or caregiver informing them that they are currently an Outpatient receiving care in our facility. Outpatient services include Observation Services. Reynaldo Metz [phone]

## 2019-05-17 NOTE — PROGRESS NOTES
Problem: Self Care Deficits Care Plan (Adult) Goal: *Acute Goals and Plan of Care (Insert Text) Description Occupational Therapy Goals: 
Initiated 5/17/2019 1. Patient will perform grooming standing with item retrieval with independence within 7 days. 2. Patient will perform toileting with independence within 7 days. 3. Patient will perform upper body dressing and lower body dressing with independence within 7 days. 4. Patient will perform one IADL task in standing with independence within 7 days. 5. Patient will transfer from toilet with independence within 7 days. Outcome: Progressing Towards Goal 
 OCCUPATIONAL THERAPY EVALUATION Patient: Marlon Sanchez (51 y.o. male) Date: 5/17/2019 Primary Diagnosis: TIA (transient ischemic attack) [G45.9] TIA (transient ischemic attack) [G45.9] Precautions:   Fall ASSESSMENT : 
Based on the objective data described below, the patient presents with equal strength and coordination in bilateral UE but is very ataxic and had multiple losses of balance throughout session today. Min to moderate assist needed for balance corrections with ADL mobility, transfers and obtaining objects. Pt is performing ADLS overall at a min assist level and pt is not at his independent baseline. Recommend inpatient rehab at discharge. Patient will benefit from skilled intervention to address the above impairments. Patient?s rehabilitation potential is considered to be Good Factors which may influence rehabilitation potential include:  
? None noted ? Mental ability/status ? Medical condition ? Home/family situation and support systems ? Safety awareness ? Pain tolerance/management ? Other: PLAN : 
Recommendations and Planned Interventions: 
?                  Self Care Training                  ? Therapeutic Activities ?                  Functional Mobility Training    ? Cognitive Retraining 
? Therapeutic Exercises           ? Endurance Activities ? Balance Training                   ? Neuromuscular Re-Education ? Visual/Perceptual Training     ? Home Safety Training 
? Patient Education                 ? Family Training/Education ? Other (comment): Frequency/Duration: Patient will be followed by occupational therapy 5 times a week to address goals. Discharge Recommendations: Inpatient Rehab Further Equipment Recommendations for Discharge: TBD SUBJECTIVE:  
Patient stated ? I can't walk straight.? OBJECTIVE DATA SUMMARY:  
HISTORY:  
Past Medical History:  
Diagnosis Date Anxiety state, unspecified 8/27/2013 Arthritis CAD (coronary artery disease) Cataract cortical, senile 8/27/2013 Heart failure ([facility] [address] [place] 1993 Hypertension Hypertensive retinopathy 8/27/2013 Observation for other specified suspected conditions [facility] Other ill-defined conditions(799.89) chronic back pain Other ill-defined conditions(799.89) AGENT ORANGE EXPOSURE, Psychiatric disorder PTSD, NO MEDS Stroke [facility]) TIA R HAND, RESOLVED Unspecified adverse effect of anesthesia SLOW TO WAKE, AGITATED WHEN HE WAKES Unspecified reason for consultation 8/27/2013 Health maintenance Past Surgical History:  
Procedure Laterality Date ABDOMEN SURGERY PROC UNLISTED    
 x2 hernia repairs CARDIAC SURG PROCEDURE UNLIST    
 stent, cardiologist Dr. Aneta Robbins HX ORTHOPAEDIC Right ROTATOR CUFF X2 Prior Level of Function/Environment/Context: independent without assist devices, driving and performing all ADLS/IADLS Expanded or extensive additional review of patient history:  
 
Home Situation Home Environment: Private residence # Steps to Enter: 4 Rails to Enter: No(wall on R) One/Two Story Residence: Two story # of Interior Steps: 12 Interior Rails: Right Lift Chair Available: No 
Living Alone: Yes Support Systems: Family member(s)(and girlfriend ) Patient Expects to be Discharged to[de-identified] Private residence Current DME Used/Available at Home: Cane, straight Tub or Shower Type: Tub/Shower combination Hand dominance: Right EXAMINATION OF PERFORMANCE DEFICITS: 
Cognitive/Behavioral Status: 
Neurologic State: Alert Orientation Level: Oriented X4 Cognition: Follows commands Perception: Appears intact Perseveration: No perseveration noted Safety/Judgement: Awareness of environment; Fall prevention;Home safety \ Hearing: Auditory Auditory Impairment: None Vision/Perceptual:   
Tracking: Able to track stimulus in all quadrants w/o difficulty Visual Fields: (intact) Diplopia: No   
Acuity: (blurry right eye due lasix surgery, otherwise intact glasses) Corrective Lenses: Reading glasses Range of Motion: 
AROM: Within functional limits PROM: Within functional limits Strength: 
Strength: Within functional limits Coordination: 
Coordination: Within functional limits Fine Motor Skills-Upper: Left Intact; Right Intact Gross Motor Skills-Upper: Left Intact; Right Intact Tone & Sensation: 
Tone: Normal 
Sensation: Intact Balance: 
Sitting: Intact; Without support Standing: Impaired; Without support Standing - Static: Fair Standing - Dynamic : Fair;Poor Functional Mobility and Transfers for ADLs: 
Bed Mobility: 
Rolling: Independent Supine to Sit: Independent Scooting: Independent Transfers: 
Sit to Stand: Contact guard assistance;Stand-by assistance Functional Transfers Bathroom Mobility: Minimum assistance(path deviations multiple loses of balance) Toilet Transfer : Minimum assistance; Other (comment)(balance) Bed to Chair: Contact guard assistance ADL Assessment: 
Feeding: Independent Oral Facial Hygiene/Grooming: Minimum assistance Bathing: Minimum assistance Upper Body Dressing: Minimum assistance Lower Body Dressing: Minimum assistance Toileting: Minimum assistance ADL Intervention and task modifications: CGA seated edge of bed to don socks with crossed leg technique Cognitive Retraining Safety/Judgement: Awareness of environment; Fall prevention;Home safety Functional Measure:  
Fugl-Pedro Assessment of Motor Recovery after Stroke:  
 
Reflex Activity Flexors/Biceps/Fingers: Can be elicited Extensors/Triceps: Can be elicited Reflex Subtotal: 4 Volitional Movement Within Synergies Shoulder Retraction: Full Shoulder Elevation: Full Shoulder Abduction (90 degrees): Full Shoulder External Rotation: Full Elbow Flexion: Full Forearm Supination: Full Shoulder Adduction/Internal Rotation: Full Elbow Extension: Full Forearm Pronation: Full Subtotal: 18 
 
Volitional Movement Mixing Synergies Hand to Lumbar Spine: Full Shoulder Flexion (0-90 degrees): Full Pronation-Supination: Full Subtotal: 6 Volitional Movement With Little or No Synergy Shoulder Abduction (0-90 degrees): Full Shoulder Flexion ( degrees): Full Pronation/Supination: Full Subtotal : 6 Normal Reflex Activity Biceps, Triceps, Finger Flexors: Full Subtotal : 2 Upper Extremity Total  
Upper Extremity Total: 36 Wrist 
Stability at 15 Degree Dorsiflexion: Full Repeated Dorsiflexion/ Volar Flexion: Full Stability at 15 Degree Dorsiflexion: Full Repeated Dorsiflexion/ Volar Flexion: Full Circumduction: Full Wrist Total: 10 Hand Mass Flexion: Full Mass Extension: Full Grasp A: Full Grasp B: Full Grasp C: Full Grasp D: Full Grasp E: Full Hand Total: 14 
 
Coordination/Speed Tremor: None Dysmetria: None Time: <1s Coordination/Speed Total : 6 Total A-D 
 Total A-D (Motor Function): 46/93 This is a reliable/valid measure of arm function after a neurological event. It has established value to characterize functional status and for measuring spontaneous and therapy-induced recovery; tests proximal and distal motor functions. Fugl-Pedro Assessment  UE scores recorded between five and 30 days post neurologic event can be used to predict UE recovery at six months post neurologic event. Severe = 0-21 points Moderately Severe = 22-33 points Moderate = 34-47 points Mild = 48-66 points WILLY Graham, SCOTT Quiroga, & RICHARD Schofield (1992). Measurement of motor recovery after stroke: Outcome assessment and sample size requirements. Stroke, 23, pp. 8658-5647.  
------------------------------------------------------------------------------------------------------------------------------------------------------------------ MCID: 
Stroke: Magali Robles et al, 2001; n = 171; mean age 79 (6) years; assessed within 16 (12) days of stroke, Acute Stroke) FMA Motor Scores from Admission to Discharge 10 point increase in FMA Upper Extremity = 1.5 change in discharge FIM  
10 point increase in FMA Lower Extremity = 1.9 change in discharge FIM 
MDC:  
Stroke:  
Sarah Wong et al, 2008, n = 14, mean age = 59.9 (14.6) years, assessed on average 14 (6.5) months post stroke, Chronic Stroke) FMA = 5.2 points for the Upper Extremity portion of the assessment Barthel Index: 
Bathin Bladder: 10 Bowels: 10 
Groomin Dressin Feeding: 10 Mobility: 10 Stairs: 5 Toilet Use: 5 Transfer (Bed to Chair and Back): 10 Total: 70/100 Percentage of impairment  
0% 1-19% 20-39% 40-59% 60-79% 80-99% 100% Barthel Score 0-100 100 99-80 79-60 59-40 20-39 1-19 
 0 The Barthel ADL Index: Guidelines 1. The index should be used as a record of what a patient does, not as a record of what a patient could do. 2. The main aim is to establish degree of independence from any help, physical or verbal, however minor and for whatever reason. 3. The need for supervision renders the patient not independent. 4. A patient's performance should be established using the best available evidence. Asking the patient, friends/relatives and nurses are the usual sources, but direct observation and common sense are also important. However direct testing is not needed. 5. Usually the patient's performance over the preceding 24-48 hours is important, but occasionally longer periods will be relevant. 6. Middle categories imply that the patient supplies over 50 per cent of the effort. 7. Use of aids to be independent is allowed. Link ., Barthel, D.W. ([id number]). Functional evaluation: the Barthel Index. [address] [facility] (14)2. Thu Hendricks chaparro CHRYSTAL Blackmon, Brendan Blackwell., Kanwal Rosario., [place], [address] [facility] (1999). Measuring the change indisability after inpatient rehabilitation; comparison of the responsiveness of the Barthel Index and Functional Caswell Measure. Journal of Neurology, Neurosurgery, and Psychiatry, 66(4), 340-965. Tiffanie Bonilla, N.J.A, HERSON Mackey, & Nini Steele MCelestinoA. (2004.) Assessment of post-stroke quality of life in cost-effectiveness studies: The usefulness of the Barthel Index and the EuroQoL-5D. [facility], 13, 858-41 Occupational Therapy Evaluation Charge Determination History Examination Decision-Making MEDIUM Complexity : Expanded review of history including physical, cognitive and psychosocial  history  LOW Complexity : 1-3 performance deficits relating to physical, cognitive , or psychosocial skils that result in activity limitations and / or participation restrictions  LOW Complexity : No comorbidities that affect functional and no verbal or physical assistance needed to complete eval tasks Based on the above components, the patient evaluation is determined to be of the following complexity level: LOW Pain: 
Pain Scale 1: Numeric (0 - 10) Pain Intensity 1: 0 Activity Tolerance:  
Please refer to the flowsheet for vital signs taken during this treatment. After treatment:  
?  Patient left in no apparent distress sitting up in chair ? Patient left in no apparent distress sitting in chair ? Call bell left within reach ? Nursing notified ? Caregiver present ? Bed alarm activated COMMUNICATION/EDUCATION:  
The patient?s plan of care was discussed with: Physical Therapist, Registered Nurse and patient . Patient was educated regarding his deficit(s) of ataxia and decreased independence with ADLS as this relates to his diagnosis of CVA. He demonstrated Good understanding as evidenced by verbalization. Patient and/or family was verbally educated on the BE FAST acronym for signs/symptoms of CVA and TIA. BE FAST was written on patient's communication board  for visual education and reinforcement. All questions answered with patient indicating good understanding. ? Home safety education was provided and the patient/caregiver indicated understanding. ? Patient/family have participated as able and agree with findings and recommendations. ?      Patient is unable to participate in plan of care at this time. This patient?s plan of care is appropriate for delegation to [facility]. Thank you for this referral. 
Rosa Ge, OTR/L Time Calculation: 41 mins

## 2019-05-17 NOTE — PROGRESS NOTES
Eval complete and note to follow. Recommend inpatient rehab at discharge pt is highly ataxic with gait and needs min to moderate assist for balance.

## 2019-05-17 NOTE — CONSULTS
NEUROLOGY NOTE Chief Complaint Patient presents with  Dizziness  Vomiting SUBJECTIVE: 
Pt continues to be ataxic No dizziness HPI Mariah Shea is a 68 y.o. male who presents to the hospital because of vertigo and imbalance. Patient states that symptoms started on 5/15/2019 at around 8 PM.  He was going upstairs when he started feeling imbalance. He was also vomiting as well. Symptoms did not improve and hence he came to the hospital.  He did not have any numbness or weakness of the upper or lower extremities. He denies any dysarthria. When he is laying down he is not having any symptoms and it comes on only when he sits up and walks. Initial CT scan of the head was normal. 
 
ROS A ten system review of constitutional, cardiovascular, respiratory, musculoskeletal, endocrine, skin, SHEENT, genitourinary, psychiatric and neurologic systems was obtained and is unremarkable except as stated in HPI  
 
PMH Past Medical History:  
Diagnosis Date  Anxiety state, unspecified 8/27/2013  Arthritis  CAD (coronary artery disease)  Cataract cortical, senile 8/27/2013  Heart failure ([address] [place] 1993  Hypertension  Hypertensive retinopathy 8/27/2013  Observation for other specified suspected conditions 8/27/2013 Observation of CCHT Program Patient  Other ill-defined conditions(799.89) chronic back pain  Other ill-defined conditions(799.89) AGENT ORANGE EXPOSURE,   
 Psychiatric disorder PTSD, NO MEDS  Stroke ([address] TIA R HAND, RESOLVED  Unspecified adverse effect of anesthesia SLOW TO WAKE, AGITATED WHEN HE WAKES  
 Unspecified reason for consultation 8/27/2013 [facility] Family History Problem Relation Age of Onset  Cancer Mother  Heart Attack Mother  Cancer Father  Anesth Problems Neg Hx [address] Social History Socioeconomic History  Marital status: SINGLE Spouse name: Not on file  Number of children: Not on file  Years of education: Not on file  Highest education level: Not on file Tobacco Use  Smoking status: Former Smoker Packs/day: 0.50 Years: 19.00 Pack years: 9.50  Smokeless tobacco: Former User Quit date: 2/28/1984 Substance and Sexual Activity  Alcohol use: No  
 Drug use: No  
 
 
ALLERGIES Allergies Allergen Reactions  Darvocet A500 [Propoxyphene N-Acetaminophen] Other (comments)  
  floaty/high feeling PHYSICAL EXAMINATION:  
Patient Vitals for the past 24 hrs: 
 Temp Pulse Resp BP SpO2  
05/17/19 0951    (!) 157/95   
05/17/19 0948    (!) 153/99   
05/17/19 0939  73  147/87   
05/17/19 0900  93  (!) 187/105   
05/17/19 0749 98.4 °F (36.9 °C) 82 18 (!) 145/93 99 % 05/17/19 0440 98.9 °F (37.2 °C) 70 18 133/74 100 % 05/16/19 2336 99.4 °F (37.4 °C) 85 18 113/57 99 % 05/16/19 1943 99.5 °F (37.5 °C) (!) 109 18 (!) 157/96 97 % 05/16/19 1842 98.5 °F (36.9 °C) 90 20 (!) 160/91 97 % 05/16/19 1630  85 22 137/72 97 % 05/16/19 1600  94 21 125/60 96 % 05/16/19 1530  82 19 118/61 95 % 05/16/19 1500 98.2 °F (36.8 °C) 80 19 122/70 96 % 05/16/19 1430  100 17 118/60 93 % 05/16/19 1400  87 21 141/75 96 % 05/16/19 1330    137/80 95 % 05/16/19 1300    119/60 95 % 05/16/19 1230    116/62 94 % 05/16/19 1200    108/66 97 % 05/16/19 1130    118/59 96 % 05/16/19 1100    113/65   
05/16/19 1057  72  115/69  General:  
General appearance: Pt is in no acute distress Distal pulses are preserved Fundoscopic exam: attempted Neurological Examination:  
Mental Status:  AAO x3. Speech is fluent. Follows commands, has normal fund of knowledge, attention, short term recall, comprehension and insight. Cranial Nerves: Visual fields are full. PERRL, Extraocular movements are full. Facial sensation intact. Facial movement intact.  Hearing intact to conversation. Palate elevates symmetrically. Shoulder shrug symmetric. Tongue midline. Motor: Strength is 5/5 in all 4 ext. Normal tone. No atrophy. Sensation: Normal to light touch Reflexes: DTRs 2+ throughout. Plantar responses downgoing. Coordination/Cerebellar: Intact to finger-nose-finger Gait: Mildly ataxic on walking Skin: No significant bruising or lacerations. LAB DATA REVIEWED:   
Recent Results (from the past 24 hour(s)) GLUCOSE, POC Collection Time: 05/16/19 12:16 PM  
Result Value Ref Range Glucose (POC) 80 65 - 100 mg/dL Performed by Abimael Mejia HEMOGLOBIN A1C WITH EAG Collection Time: 05/17/19  3:56 AM  
Result Value Ref Range Hemoglobin A1c 5.5 4.2 - 6.3 % Est. average glucose 111 mg/dL METABOLIC PANEL, COMPREHENSIVE Collection Time: 05/17/19  3:56 AM  
Result Value Ref Range Sodium 143 136 - 145 mmol/L Potassium 3.3 (L) 3.5 - 5.1 mmol/L Chloride 108 97 - 108 mmol/L  
 CO2 28 21 - 32 mmol/L Anion gap 7 5 - 15 mmol/L Glucose 94 65 - 100 mg/dL BUN 9 6 - 20 MG/DL Creatinine 1.00 0.70 - 1.30 MG/DL  
 BUN/Creatinine ratio 9 (L) 12 - 20 GFR est AA >60 >60 ml/min/1.73m2 GFR est non-AA >60 >60 ml/min/1.73m2 Calcium 7.9 (L) 8.5 - 10.1 MG/DL Bilirubin, total 1.5 (H) 0.2 - 1.0 MG/DL  
 ALT (SGPT) 12 12 - 78 U/L  
 AST (SGOT) 13 (L) 15 - 37 U/L Alk. phosphatase 74 45 - 117 U/L Protein, total 6.7 6.4 - 8.2 g/dL Albumin 3.1 (L) 3.5 - 5.0 g/dL Globulin 3.6 2.0 - 4.0 g/dL A-G Ratio 0.9 (L) 1.1 - 2.2 LIPID PANEL Collection Time: 05/17/19  3:56 AM  
Result Value Ref Range LIPID PROFILE Cholesterol, total 125 <200 MG/DL Triglyceride 92 <150 MG/DL  
 HDL Cholesterol 51 MG/DL  
 LDL, calculated 55.6 0 - 100 MG/DL VLDL, calculated 18.4 MG/DL  
 CHOL/HDL Ratio 2.5 0.0 - 5.0    
CBC W/O DIFF Collection Time: 05/17/19  3:56 AM  
Result Value Ref Range WBC 5.4 4.1 - 11.1 K/uL RBC 4.74 4.10 - 5.70 M/uL  
 HGB 12.5 12.1 - 17.0 g/dL HCT 40.2 36.6 - 50.3 % MCV 84.8 80.0 - 99.0 FL  
 MCH 26.4 26.0 - 34.0 PG  
 MCHC 31.1 30.0 - 36.5 g/dL  
 RDW 12.8 11.5 - 14.5 % PLATELET 204 381 - 202 K/uL MPV 9.7 8.9 - 12.9 FL  
 NRBC 0.0 0  WBC ABSOLUTE NRBC 0.00 0.00 - 0.01 K/uL BILIRUBIN, DIRECT Collection Time: 19  3:56 AM  
Result Value Ref Range Bilirubin, direct 0.3 (H) 0.0 - 0.2 MG/DL  
TSH 3RD GENERATION Collection Time: 19  3:56 AM  
Result Value Ref Range TSH 0.15 (L) 0.36 - 3.74 uIU/mL HOME MEDS Prior to Admission Medications Prescriptions Last Dose Informant Patient Reported? Taking? NITROSTAT 0.4 mg SL tablet Not Taking at Unknown time Self Yes No  
Si.4 mg by SubLINGual route every five (5) minutes as needed for Chest Pain. amLODIPine (NORVASC) 10 mg tablet 2019 at Unknown time Self Yes Yes Sig: Take 10 mg by mouth daily. aspirin delayed-release 81 mg tablet 5/15/2019 at Unknown time Self Yes Yes Sig: Take 81 mg by mouth daily. atorvastatin (LIPITOR) 40 mg tablet 2019 at Unknown time Self Yes Yes Sig: Take 40 mg by mouth nightly. carvedilol (COREG CR) 80 mg CR capsule 2019 at Unknown time Self Yes Yes Sig: Take 80 mg by mouth daily (with breakfast). clopidogrel (PLAVIX) 75 mg tablet 2019 at Unknown time Self Yes Yes Sig: Take 75 mg by mouth daily. diclofenac (VOLTAREN) 1 % gel Not Taking at Unknown time Self No No  
Sig: Apply  to affected area four (4) times daily. ezetimibe (ZETIA) 10 mg tablet 2019 at Unknown time Self Yes Yes Sig: Take 10 mg by mouth daily. fluticasone propionate (FLONASE) 50 mcg/actuation nasal spray 2019 at Unknown time Self Yes Yes Si Lockhart by Both Nostrils route two (2) times daily as needed for Rhinitis. ibuprofen (MOTRIN) 600 mg tablet 5/15/2019 at Unknown time Self Yes Yes Sig: Take 600 mg by mouth every six (6) hours as needed for Pain. irbesartan-hydroCHLOROthiazide (AVALIDE) 300-12.5 mg per tablet 5/13/2019 at Unknown time Self Yes Yes Sig: Take 1 Tab by mouth daily. potassium chloride (K-DUR, KLOR-CON) 20 mEq tablet 5/13/2019 at Unknown time Self Yes Yes Sig: Take 20 mEq by mouth daily. Facility-Administered Medications: None CURRENT MEDS Current Facility-Administered Medications Medication Dose Route Frequency  aspirin delayed-release tablet 81 mg  81 mg Oral DAILY  atorvastatin (LIPITOR) tablet 40 mg  40 mg Oral QHS  clopidogrel (PLAVIX) tablet 75 mg  75 mg Oral DAILY  ezetimibe (ZETIA) tablet 10 mg  10 mg Oral DAILY  potassium chloride (K-DUR, KLOR-CON) SR tablet 20 mEq  20 mEq Oral DAILY  sodium chloride (NS) flush 5-40 mL  5-40 mL IntraVENous Q8H  
 heparin (porcine) injection 5,000 Units  5,000 Units SubCUTAneous Q8H Stroke workup MRI Brain Pending CT Head/CTA head and neck No evidence for acute large vessel arterial occlusion. Moderate proximal short 
segment narrowing of the left vertebral artery which is patent distally. Moderate to severe cervical spondylosis Numerous bilateral thyroid nodules CT perfusion brain: No significant perfusion abnormality TTE:  
Pending Stroke labs: 
HgBA1c Lab Results Component Value Date/Time Hemoglobin A1c 5.5 05/17/2019 03:56 AM  
 
LDL Lab Results Component Value Date/Time LDL, calculated 55.6 05/17/2019 03:56 AM  
 
 
IMPRESSION: 
Sushila Ackerman is a 68 y.o. male who presents with new onset vertigo that started on 5/15/2019. Symptoms are persisting. Patient has had 2 strokes in the past with the last one in 2016 with no residual symptoms. Nonfocal findings. Asymptomatic when he is laying down but gets symptoms when he stands up and walks.   Suspect that this is peripheral vertigo but will get MRI of the brain because of his previous history of stroke and possibility of posterior circulation involvement. RECOMMENDATIONS: 
- MRI brain w/o C - Pending 
- TTE - Pending - Telemetry - BP goal is less than 140/90 
- Continue ASA 81 mg daily and Plavix 75 mg daily 
- Continue atorvastatin 40 mg daily  
- ST/OT/PT - Recommend Inpt rehab Addendum: 1. MRI brain shows - Small acute infarction ventral vermis. Chronic right matter disease and remote 
right parietal infarction as above. 2. Cont aspirin 81 mg a day and plavix 75 mg daily TTE is pending. No further neuro sotomayor. Call with questions.   
 
John Huang MD 
Neurologist

## 2019-05-17 NOTE — PROGRESS NOTES
Hospitalist Progress Note NAME: Elvin Villarreal :  1945 MRN:  [id number] Assessment / Plan: 
Acute cva cerebellar vermis - cont with plavix. Ptot. Possible rehab. Fu echo and lipids. Mild total hyperbilirubinemia 
-Total bilirubin is slightly elevated at 1.3.  stable monitor. Rhiannon Neumann History of coronary artery disease status post stents in the past 
Dyslipidemia History of CVA Hypertension 
 - resume antihypertensives. -Resume home aspirin, Plavix, statin 
  
  
  
Code Status: full Surrogate Decision Maker: Raghu Gupta 
  
DVT Prophylaxis: heparin GI Prophylaxis: not indicated 
  
Baseline: From home independent Subjective: Chief Complaint / Reason for Physician Visit \"still dizzy but partially resolved while lying flat. \". Discussed with RN events overnight. Review of Systems: 
Symptom Y/N Comments  Symptom Y/N Comments Fever/Chills    Chest Pain Poor Appetite    Edema Cough    Abdominal Pain Sputum    Joint Pain SOB/BRASHER    Pruritis/Rash Nausea/vomit    Tolerating PT/OT Diarrhea    Tolerating Diet Constipation    Other Could NOT obtain due to:   
 
Objective: VITALS:  
Last 24hrs VS reviewed since prior progress note. Most recent are: 
Patient Vitals for the past 24 hrs: 
 Temp Pulse Resp BP SpO2  
19 1521 98.3 °F (36.8 °C) 87 20 137/71 99 % 19 1212 98.3 °F (36.8 °C) 86 18 143/82 100 % 19 0951    (!) 157/95   
19 0948    (!) 153/99   
19 0939  73  147/87   
19 0900  93  (!) 187/105   
19 0749 98.4 °F (36.9 °C) 82 18 (!) 145/93 99 % 19 0440 98.9 °F (37.2 °C) 70 18 133/74 100 % 19 2336 99.4 °F (37.4 °C) 85 18 113/57 99 % 19 1943 99.5 °F (37.5 °C) (!) 109 18 (!) 157/96 97 % 19 1842 98.5 °F (36.9 °C) 90 20 (!) 160/91 97 % 19 1630  85 22 137/72 97 % Intake/Output Summary (Last 24 hours) at 2019 1605 Last data filed at 5/17/2019 1335 Gross per 24 hour Intake 1500 ml Output 150 ml Net 1350 ml PHYSICAL EXAM: 
General: WD, WN. Alert, cooperative, no acute distress   
EENT:  EOMI. Anicteric sclerae. MMM Resp:  CTA bilaterally, no wheezing or rales. No accessory muscle use CV:  Regular  rhythm,  No edema GI:  Soft, Non distended, Non tender.  +Bowel sounds Neurologic:  Alert and oriented X 3, normal speech, Psych:   Good insight. Not anxious nor agitated Skin:  No rashes. No jaundice Reviewed most current lab test results and cultures  YES Reviewed most current radiology test results   YES Review and summation of old records today    NO Reviewed patient's current orders and MAR    YES 
PMH/SH reviewed - no change compared to H&P 
________________________________________________________________________ Care Plan discussed with: 
  Comments Patient Family RN Care Manager Consultant Multidiciplinary team rounds were held today with , nursing, pharmacist and clinical coordinator. Patient's plan of care was discussed; medications were reviewed and discharge planning was addressed. ________________________________________________________________________ Total NON critical care TIME:  30   Minutes Total CRITICAL CARE TIME Spent:   Minutes non procedure based Comments >50% of visit spent in counseling and coordination of care    
________________________________________________________________________ Orin Salvage, DO  
 
Procedures: see electronic medical records for all procedures/Xrays and details which were not copied into this note but were reviewed prior to creation of Plan. LABS: 
I reviewed today's most current labs and imaging studies. Pertinent labs include: 
Recent Labs 05/17/19 
0356 05/16/19 
0930 WBC 5.4 5.3 HGB 12.5 12.7 HCT 40.2 41.3  173 Recent Labs 05/17/19 
0356 05/16/19 0930  
 140  
K 3.3* 3.6  105 CO2 28 32 GLU 94 113* BUN 9 10 CREA 1.00 1.04  
CA 7.9* 8.1* ALB 3.1* 3.3* TBILI 1.5* 1.3*  
SGOT 13* 16 ALT 12 13 INR  --  1.1 Signed: Andrea Northern, DO

## 2019-05-17 NOTE — PROGRESS NOTES
* No surgery found * 
* No surgery found * Bedside shift change report given to Jossie Cavazos (oncoming nurse) by Brunilda Santillan (offgoing nurse). Report [facility] the following [facility]   [phone] Significant changes during shift:  MRI done Patient Information Bar Mayes 68 y.o. 
5/16/2019  8:47 AM by Shay Gibson DO. Bar Mayes was admitted from Home 
 
Problem List 
 
Patient Active Problem List  
 Diagnosis Date Noted  TIA (transient ischemic attack) 05/16/2019  Stenosis of both internal carotid arteries 12/14/2016  Cerebral infarction involving right middle cerebral artery ([address] 12/14/2016  Stroke (cerebrum) ([address] 12/13/2016  Rotator cuff tear, left 03/05/2014  Anxiety state, unspecified 08/27/2013  Cataract cortical, senile 08/27/2013  Unspecified reason for consultation 08/27/2013  Hypertensive retinopathy 08/27/2013  Observation for other specified suspected conditions 08/27/2013  Hypercholesterolemia 11/28/2011  CAD (coronary artery disease) 11/28/2011  
 HTN (hypertension), benign 11/28/2011 Past Medical History:  
Diagnosis Date  Anxiety state, unspecified 8/27/2013  Arthritis  CAD (coronary artery disease)  Cataract cortical, senile 8/27/2013  Heart failure ([address] [place] 1993  Hypertension  Hypertensive retinopathy 8/27/2013  Observation for other specified suspected conditions 8/27/2013 Observation of CCHT Program Patient  Other ill-defined conditions(799.89) chronic back pain  Other ill-defined conditions(799.89) AGENT ORANGE EXPOSURE,   
 Psychiatric disorder PTSD, NO MEDS  Stroke ([address] TIA R HAND, RESOLVED  Unspecified adverse effect of anesthesia SLOW TO WAKE, AGITATED WHEN HE WAKES  
 Unspecified reason for consultation 8/27/2013 Health maintenance Core Measures: CVA: Yes Yes CHF:No No 
PNA:No No 
 
Post Op Surgical (If Applicable):  
 
NActivity Status: OOB to Chair Yes Ambulated this shift Yes Bed Rest No 
 
Supplemental O2: (If Applicable) NC No 
NRB No 
Venti-mask No 
On  Liters/min LINES AND DRAINS:PIV 
 
 
 
DVT prophylaxis: DVT prophylaxis Med- Yes DVT prophylaxis SCD or LIV- Yes Wounds: (If Applicable) Wounds- No 
 
Location Patient Safety: 
 
Falls Score Total Score: 3 Safety Level_______ Bed Alarm On? Yes Sitter? No 
 
Plan for upcoming shift: ECHO Discharge Plan: Yes INpatient rehab Active Consults: 
IP CONSULT TO HOSPITALIST 
IP CONSULT TO NEUROLOGY

## 2019-05-17 NOTE — PROGRESS NOTES
Orders received, chart reviewed and patient evaluated by physical therapy. Recommend patient to discharge to IP rehab pending progress with skilled acute care physical therapy. Recommend with nursing patient to complete as able in order to maintain strength, endurance and independence: OOB to chair 3x/day with x 1 assist and ambulating with x 1 assist. Thank you for your assistance. Full evaluation to follow.   
 
Sobia Olivares, PT, DPT

## 2019-05-18 NOTE — PROGRESS NOTES
Bedside and Verbal shift change report given to [address] [place] [address] (oncoming nurse) by Molly Mock (offgoing nurse). Report included the following information SBAR, Kardex, MAR and Recent Results.

## 2019-05-18 NOTE — PROGRESS NOTES
Hospitalist Progress Note NAME: Elvin Villarreal :  1945 MRN:  [id number] Assessment / Plan: 
Acute cva cerebellar vermis - cont with plavix. Ptot. Possible rehab. Fu echo Hyperlipidemia - place on lipitor 40mg po daily. Mild total hyperbilirubinemia 
-Total bilirubin is slightly elevated at 1.3.  stable monitor. Bmp in am. 
 
History of coronary artery disease status post stents in the past 
Dyslipidemia History of CVA Hypertension 
 - resume antihypertensives. -Resume home aspirin, Plavix, statin 
  
  
  
Code Status: full Surrogate Decision Maker: Sister Cuco Cabello 
  
DVT Prophylaxis: heparin GI Prophylaxis: not indicated 
  
Baseline: From home independent Subjective: Chief Complaint / Reason for Physician Visit \"still dizzy. No nauea or vomiting. \". Discussed with RN events overnight. Review of Systems: 
Symptom Y/N Comments  Symptom Y/N Comments Fever/Chills    Chest Pain Poor Appetite    Edema Cough    Abdominal Pain Sputum    Joint Pain SOB/BRASHER    Pruritis/Rash Nausea/vomit    Tolerating PT/OT Diarrhea    Tolerating Diet Constipation    Other Could NOT obtain due to:   
 
Objective: VITALS:  
Last 24hrs VS reviewed since prior progress note. Most recent are: 
Patient Vitals for the past 24 hrs: 
 Temp Pulse Resp BP SpO2  
19 0747 98.1 °F (36.7 °C) 77 18 (!) 170/93 96 % 19 0521 97.9 °F (36.6 °C) 82 18 136/79 98 % 19 0000 98.5 °F (36.9 °C) 81 18 151/76 99 % 19 2042 99.6 °F (37.6 °C) 93 20 135/78 96 % 19 1521 98.3 °F (36.8 °C) 87 20 137/71 99 % 19 1212 98.3 °F (36.8 °C) 86 18 143/82 100 % Intake/Output Summary (Last 24 hours) at 2019 1115 Last data filed at 2019 3406 Gross per 24 hour Intake 300 ml Output 225 ml Net 75 ml PHYSICAL EXAM: 
General: WD, WN. Alert, cooperative, no acute distress   
EENT:  EOMI. Anicteric sclerae. MMM Resp: CTA bilaterally, no wheezing or rales. No accessory muscle use CV:  Regular  rhythm,  No edema GI:  Soft, Non distended, Non tender.  +Bowel sounds Neurologic:  Alert and oriented X 3, normal speech, Psych:   Good insight. Not anxious nor agitated Skin:  No rashes. No jaundice Reviewed most current lab test results and cultures  YES Reviewed most current radiology test results   YES Review and summation of old records today    NO Reviewed patient's current orders and MAR    YES 
PMH/SH reviewed - no change compared to H&P 
________________________________________________________________________ Care Plan discussed with: 
  Comments Patient Family RN Care Manager Consultant Multidiciplinary team rounds were held today with , nursing, pharmacist and clinical coordinator. Patient's plan of care was discussed; medications were reviewed and discharge planning was addressed. ________________________________________________________________________ Total NON critical care TIME:  30   Minutes Total CRITICAL CARE TIME Spent:   Minutes non procedure based Comments >50% of visit spent in counseling and coordination of care    
________________________________________________________________________ Shay Gibson DO  
 
Procedures: see electronic medical records for all procedures/Xrays and details which were not copied into this note but were reviewed prior to creation of Plan. LABS: 
I reviewed today's most current labs and imaging studies. Pertinent labs include: 
Recent Labs 05/17/19 
0356 05/16/19 
0930 WBC 5.4 5.3 HGB 12.5 12.7 HCT 40.2 41.3  173 Recent Labs 05/17/19 
0356 05/16/19 
0930  140  
K 3.3* 3.6  105 CO2 28 32 GLU 94 113* BUN 9 10 CREA 1.00 1.04  
CA 7.9* 8.1* ALB 3.1* 3.3* TBILI 1.5* 1.3*  
SGOT 13* 16 ALT 12 13 INR  --  1.1 Signed: William Renae DO

## 2019-05-19 NOTE — PROGRESS NOTES
Problem: Falls - Risk of 
Goal: *Absence of Falls Description Document [facility] Fall Risk and appropriate interventions in the flowsheet. Outcome: Progressing Towards Goal 
  
Problem: Patient Education: Go to Patient Education Activity Goal: Patient/Family Education Outcome: Progressing Towards Goal 
  
Problem: Patient Education: Go to Patient Education Activity Goal: Patient/Family Education Outcome: Progressing Towards Goal 
  
Problem: TIA/CVA Stroke: 0-24 hours Goal: Consults, if ordered Outcome: Progressing Towards Goal 
  
Problem: Patient Education: Go to Patient Education Activity Goal: Patient/Family Education Outcome: Progressing Towards Goal 
  
Problem: Patient Education: Go to Patient Education Activity Goal: Patient/Family Education Outcome: Progressing Towards Goal

## 2019-05-19 NOTE — ROUTINE PROCESS
Bedside shift change report given to Marlo (oncoming nurse) by Poppy (offgoing nurse). Report included the following information [facility]. 
  
[facility] Phone:   [phone] 
  
  
Significant changes during shift:  None.  
  
  
  
Patient Information 
  Sushila Ackerman 68 y.o. 
5/16/2019  8:47 AM by Lelo Simmons admitted from Home 
  
Problem List 
  
       
Patient Active Problem List  
  Diagnosis Date Noted  TIA (transient ischemic attack) 05/16/2019  Stenosis of both internal carotid arteries 12/14/2016  Cerebral infarction involving right middle cerebral artery ([address] 12/14/2016  Stroke (cerebrum) ([address] 12/13/2016  Rotator cuff tear, left 03/05/2014  Anxiety state, unspecified 08/27/2013  Cataract cortical, senile 08/27/2013  Unspecified reason for consultation 08/27/2013  Hypertensive retinopathy 08/27/2013  Observation for other specified suspected conditions 08/27/2013  Hypercholesterolemia 11/28/2011  CAD (coronary artery disease) 11/28/2011  
 HTN (hypertension), benign 11/28/2011  
  
       
Past Medical History:  
Diagnosis Date  Anxiety state, unspecified 8/27/2013  Arthritis    
 CAD (coronary artery disease)    
 Cataract cortical, senile 8/27/2013  Heart failure ([address]    
  MI 1993  Hypertension    
 Hypertensive retinopathy 8/27/2013  Observation for other specified suspected conditions 8/27/2013  
  Observation of CCHT Program Patient  Other ill-defined conditions(799.89)    
  chronic back pain  Other ill-defined conditions(799.89)    
  AGENT ORANGE EXPOSURE,   
 Psychiatric disorder    
  PTSD, NO MEDS  Stroke ([address]    
  TIA R HAND, RESOLVED  Unspecified adverse effect of anesthesia    
  SLOW TO WAKE, AGITATED WHEN HE WAKES  
 Unspecified reason for consultation 8/27/2013  
  Health maintenance  
  
  
  
Core Measures: 
  
CVA: Yes Yes CHF:No No 
PNA:No No 
  
Post Op Surgical (If Applicable):  
  
 NActivity Status: 
  
OOB to Huan Xiong Ambulated this shift Yes  
Bed Rest No 
  
Supplemental A0: (JY Applicable) 
  
NC No 
NRB No 
Venti-mask No 
On  Liters/min 
  
  
LINES AND DRAINS:PIV 
  
  
  
DVT prophylaxis: 
  
DVT prophylaxis Med- Yes DVT prophylaxis SCD or LIV- Yes  
  
Wounds: (If Applicable) 
  
Wounds- No 
  
Location  
  
Patient Safety: 
  
Falls Score Total Score: 3 Safety Level_______ Bed Alarm On? Yes Sitter? No 
  
Plan for upcoming shift: Safey, pain control, and continue dcp.  
  
  
  
Discharge Plan: Yes INpatient rehab 
  
Active Consults: 
IP CONSULT TO HOSPITALIST 
IP CONSULT TO NEUROLOGY

## 2019-05-19 NOTE — PROGRESS NOTES
Hospitalist Progress Note NAME: Mariah Shea :  1945 MRN:  [id number] Assessment / Plan: 
Acute cva cerebellar vermis -  
cont with plavix, aspirin,statin 
 Ptot. Rehab Placement ECHO concerning for Left Apical Thrombus 
-will put on Lovenox therpaeutic 
-Neuro on board Systolic CHF, EF 64-19%- NEW Cardiology evalv Coreg Hyperlipidemia - place on lipitor 40mg po daily. Mild total hyperbilirubinemia 
-Total bilirubin is slightly elevated at 1.3. -->1.1 stable monitor. Bmp in am. 
 
History of coronary artery disease status post stents in the past 
Dyslipidemia History of CVA Hypertension 
 - resume antihypertensives. With goal BP <140/90 
-Resume home aspirin, Plavix, statin 
  
  
  
Code Status: full Surrogate Decision Maker: Sister Kevin Becerra 
  
DVT Prophylaxis: Lovenox GI Prophylaxis: not indicated 
  
Baseline: From home independent Inpatient rehab if accepted. Subjective: Chief Complaint / Reason for Physician Visit \"Diziness improved, but reports being ataxic on walking. No nauea or vomiting. \". Discussed with RN events overnight. Review of Systems: 
Symptom Y/N Comments  Symptom Y/N Comments Fever/Chills n   Chest Pain n   
Poor Appetite n   Edema n   
Cough n   Abdominal Pain n   
Sputum n   Joint Pain SOB/BRASHER n   Pruritis/Rash Nausea/vomit n   Tolerating PT/OT Diarrhea    Tolerating Diet Constipation    Other Could NOT obtain due to:   
 
Objective: VITALS:  
Last 24hrs VS reviewed since prior progress note. Most recent are: 
Patient Vitals for the past 24 hrs: 
 Temp Pulse Resp BP SpO2  
19 1127 98.2 °F (36.8 °C) 79 18 (!) 144/97 95 % 19 0756 98.6 °F (37 °C) 82 18 (!) 171/92 98 % 19 0325 97.8 °F (36.6 °C) 71 18 140/83 99 % 19 2257 98 °F (36.7 °C) 81 18 (!) 152/105 98 % 19 1956 97.9 °F (36.6 °C) 80 18 (!) 144/96 98 % 19 1519 98.2 °F (36.8 °C) 76 18 163/85 96 % Intake/Output Summary (Last 24 hours) at 5/19/2019 1431 Last data filed at 5/19/2019 4366 Gross per 24 hour Intake  Output 300 ml Net -300 ml PHYSICAL EXAM: 
General: WD, WN. Alert, cooperative, no acute distress   
EENT:  EOMI. Anicteric sclerae. MMM Resp:  CTA bilaterally, no wheezing or rales. No accessory muscle use CV:  Regular  rhythm,  No edema GI:  Soft, Non distended, Non tender.  +Bowel sounds Neurologic:  Alert and oriented X 3, normal speech, Psych:   Good insight. Not anxious nor agitated Skin:  No rashes. No jaundice Reviewed most current lab test results and cultures  YES Reviewed most current radiology test results   YES Review and summation of old records today    NO Reviewed patient's current orders and MAR    YES 
PMH/SH reviewed - no change compared to H&P 
________________________________________________________________________ Care Plan discussed with: 
  Comments Patient x Family RN Care Manager Consultant Multidiciplinary team rounds were held today with , nursing, pharmacist and clinical coordinator. Patient's plan of care was discussed; medications were reviewed and discharge planning was addressed. ________________________________________________________________________ Total NON critical care TIME:  25   Minutes Total CRITICAL CARE TIME Spent:   Minutes non procedure based Comments >50% of visit spent in counseling and coordination of care    
________________________________________________________________________ Liseth Marie MD  
 
Procedures: see electronic medical records for all procedures/Xrays and details which were not copied into this note but were reviewed prior to creation of Plan. LABS: 
I reviewed today's most current labs and imaging studies. Pertinent labs include: 
Recent Labs 05/19/19 
5413 05/17/19 
1840 WBC 3.8* 5.4 HGB 13.3 12.5 HCT 41.5 40.2  182 Recent Labs 05/19/19 
5028 05/17/19 
2701  143  
K 3.5 3.3*  
 108 CO2 27 28 * 94 BUN 14 9 CREA 1.02 1.00  
CA 8.2* 7.9* ALB 3.2* 3.1* TBILI 1.1* 1.5* SGOT 18 13* ALT 16 12 Signed: Katherine Snow MD

## 2019-05-19 NOTE — ROUTINE PROCESS
No surgery found * 
* No surgery found * Bedside shift change report given to Poppy (oncoming nurse) by AdAlta (offgoing nurse). Report included the following information [facility]. 
  
[facility] Phone:   [phone] 
  
  
Significant changes during shift:  None.  
  
  
  
Patient Information 
  Vijay Bansal 68 y.o. 
5/16/2019  8:47 AM by Dusty Miles DO. Vijay Bansal was admitted from Home 
  
Problem List 
  
    
Patient Active Problem List  
  Diagnosis Date Noted  TIA (transient ischemic attack) 05/16/2019  Stenosis of both internal carotid arteries 12/14/2016  Cerebral infarction involving right middle cerebral artery ([address] 12/14/2016  Stroke (cerebrum) ([address] 12/13/2016  Rotator cuff tear, left 03/05/2014  Anxiety state, unspecified 08/27/2013  Cataract cortical, senile 08/27/2013  Unspecified reason for consultation 08/27/2013  Hypertensive retinopathy 08/27/2013  Observation for other specified suspected conditions 08/27/2013  Hypercholesterolemia 11/28/2011  CAD (coronary artery disease) 11/28/2011  
 HTN (hypertension), benign 11/28/2011  
  
    
Past Medical History:  
Diagnosis Date  Anxiety state, unspecified 8/27/2013  Arthritis    
 CAD (coronary artery disease)    
 Cataract cortical, senile 8/27/2013  Heart failure ([address]    
  MI 1993  Hypertension    
 Hypertensive retinopathy 8/27/2013  Observation for other specified suspected conditions 8/27/2013  
  [facility]  Other ill-defined conditions(799.89)    
  chronic back pain  Other ill-defined conditions(799.89)    
  AGENT ORANGE EXPOSURE,   
 Psychiatric disorder    
  PTSD, NO MEDS  Stroke ([address]    
  TIA R HAND, RESOLVED  Unspecified adverse effect of anesthesia    
  SLOW TO WAKE, AGITATED WHEN HE WAKES  
 Unspecified reason for consultation 8/27/2013  
  Health maintenance  
  
  
  
Core Measures: 
  
CVA: Yes Yes CHF:No No 
PNA:No No 
  
 Post Op Surgical (If Applicable):  
  
NActivity Status: 
  
OOB to Chair Yes Ambulated this shift Yes Bed Rest No 
  
Supplemental O2: (If Applicable) 
  
NC No 
NRB No 
Venti-mask No 
On  Liters/min 
  
  
LINES AND DRAINS:PIV 
  
  
  
DVT prophylaxis: 
  
DVT prophylaxis Med- Yes DVT prophylaxis SCD or LIV- Yes  
  
Wounds: (If Applicable) 
  
Wounds- No 
  
Location  
  
Patient Safety: 
  
Falls Score Total Score: 3 Safety Level_______ Bed Alarm On? Yes Sitter?  No 
  
Plan for upcoming shift: Safey, pain control, and continue dcp.  
  
  
  
Discharge Plan: Yes INpatient rehab 
  
Active Consults: 
IP CONSULT TO HOSPITALIST 
IP CONSULT TO NEUROLOGY

## 2019-05-20 NOTE — CONSULTS
Consult NAME: Torrie Brar :  1945 MRN:  [id number] Date/Time:  2019 5:28 PM 
 
Patient PCP: Nella Dyer MD 
________________________________________________________________________ Assessment: 1. Dizziness, TIA, MRI + for CVA, Echo concerning for LV thrombus 2. Hx of MI, hx of distant Stent 3. Echo 2019 EF 35%, apical hypokinesis, can not exclude thrombus 4. Sinus with anterior septal Q waves 5. HTN 
6. Dyslipidemia 7. Hx of CVA 8. Allergies 9. OA 10. PTSD 11. , one son , has grandkids, retired, mild functional capacity 12. Cardiologist:  Dr. Isaías Russo Plan:  
 
CVA, echo with anterior HK, can not exclude thrombus, given recurrent CVA on aspirin and plavix, does benefit from anticoagulation. All risk/benefits/alternatives discussed with patient and agrees 1. Change lovenox to xarelto 20mg 2. Cont ASA 3. Stop plavix 4. Cont coreg 5. Cont ARB 6. Cont norvasc 7. Cont statin PT/OT Follow up with Dr. Isaías Russo. [x]        High complexity decision making was performed Subjective: CHIEF COMPLAINT: Dizziness HISTORY OF PRESENT ILLNESS:    
 
This is a 36-year-old male with past medical history of CVA, CAD is coming to the hospital with chief complaints of dizziness and vomiting.  Patient reports being in his usual state of health until last night at around 8 PM when he started having dizziness and spinning sensation like as if the room is spinning around him. [facility] also felt lightheaded and unsteady.  He does not report any recent ear trauma or ear infection.  Does not report similar episodes in the past. [facility] does not report any focal weakness, tingling, numbness, facial deviation or slurred speech.  Does not report any vision deficits.  Upon presenting to the emergency department here he was made to walk and was leaning to the left side which prompted ER physician to do a stroke work-up.  Patient also reported nausea along with 2 episodes of vomitings which are clear.  He does not report any abdominal pain, diarrhea or constipation. On arrival to the hospital he was slightly tachycardic and blood pressure was 156/94.  CBC was within normal limits.  BMP was also normal.  He had a CT head which showed evidence of no acute process but CT angiogram showed moderate proximal short segment narrowing of the left vertebral artery which is patent distally with moderate to severe cervical spondylosis and bilateral thyroid nodules. No chest pain, no dyspnea, no edema, no palps, no syncope. We were asked to consult for work up and evaluation of the above problems. Past Medical History:  
Diagnosis Date  Anxiety state, unspecified 8/27/2013  Arthritis  CAD (coronary artery disease)  Cataract cortical, senile 8/27/2013  Heart failure ([address] MI 1993  Hypertension  Hypertensive retinopathy 8/27/2013  Observation for other specified suspected conditions 8/27/2013 Observation of [facility] Program Patient  Other ill-defined conditions(799.89) chronic back pain  Other ill-defined conditions(799.89) AGENT ORANGE EXPOSURE,   
 Psychiatric disorder PTSD, NO MEDS  Stroke ([address] TIA R HAND, RESOLVED  Unspecified adverse effect of anesthesia SLOW TO WAKE, AGITATED WHEN HE WAKES  
 Unspecified reason for consultation 8/27/2013 Health maintenance Past Surgical History:  
Procedure Laterality Date  ABDOMEN SURGERY PROC UNLISTED    
 x2 hernia repairs  CARDIAC SURG PROCEDURE UNLIST    
 stent, cardiologist Dr. Glorine Nyhan  HX ORTHOPAEDIC Right ROTATOR CUFF X2 Allergies Allergen Reactions  Darvocet A500 [Propoxyphene N-Acetaminophen] Other (comments)  
  floaty/high feeling Meds:  See below Social History Tobacco Use  Smoking status: Former Smoker Packs/day: 0.50 Years: 19.00 Pack years: 9.50  Smokeless tobacco: Former User Quit date: 2/28/1984 Substance Use Topics  Alcohol use: No  
  
Family History Problem Relation Age of Onset  Cancer Mother  Heart Attack Mother  Cancer Father  Anesth Problems Neg Hx REVIEW OF SYSTEMS:   
 []         Unable to obtain  ROS due to --- 
 [x]         Total of 12 systems reviewed as follows: Total of 12 systems reviewed as follows:   
   POSITIVE= Bold text  Negative = normal text General:  fever, chills, sweats, generalized weakness, weight loss/gain,  
   loss of appetite Eyes:    blurred vision, eye pain, loss of vision, double vision ENT:    rhinorrhea, pharyngitis Respiratory:   cough, sputum production, SOB, BRASHER, wheezing, pleuritic pain  
Cardiology:   chest pain, palpitations, orthopnea, PND, edema, syncope Gastrointestinal:  abdominal pain , N/V, diarrhea, dysphagia, constipation, bleeding Genitourinary:  frequency, urgency, dysuria, hematuria, incontinence Muskuloskeletal :  arthralgia, myalgia, back pain Hematology:  easy bruising, nose or gum bleeding, lymphadenopathy Dermatological: rash, ulceration, pruritis, color change / jaundice Endocrine:   hot flashes or polydipsia Neurological:  headache, dizziness, confusion, focal weakness, paresthesia, Speech difficulties, memory loss, gait difficulty Psychological: Feelings of anxiety, depression, agitation Objective:  
  
Physical Exam: 
 
Last 24hrs VS reviewed since prior progress note. Most recent are: 
 
Visit Vitals /89 (BP Patient Position: Sitting) Pulse 81 Temp 98.3 °F (36.8 °C) Resp 18 Ht 5' 9\" (1.753 m) Wt 71.7 kg (158 lb) SpO2 100% BMI 23.33 kg/m² Intake/Output Summary (Last 24 hours) at 5/20/2019 1728 Last data filed at 5/20/2019 1118 Gross per 24 hour Intake  Output 550 ml Net -550 ml General Appearance: Well developed, well nourished, alert & oriented x 3,  
 no acute distress. Ears/Nose/Mouth/Throat: Pupils equal and round, Hearing grossly normal. 
Neck: Supple. JVP within normal limits. Carotids good upstrokes, with no bruit. Chest: Lungs clear to auscultation bilaterally. Cardiovascular: Regular rate and rhythm, S1S2 normal, no murmur, rubs, gallops. Abdomen: Soft, non-tender, bowel sounds are active. No organomegaly. Extremities: No edema bilaterally. Femoral pulses +2, Distal Pulses +1. Skin: Warm and dry. Neuro: CN II-XII grossly intact, Strength and sensation grossly intact. Per neuro Data:  
  
Prior to Admission medications Medication Sig Start Date End Date Taking? Authorizing Provider  
amLODIPine (NORVASC) 10 mg tablet Take 10 mg by mouth daily. Yes Provider, Historical  
atorvastatin (LIPITOR) 40 mg tablet Take 40 mg by mouth nightly. Yes Provider, Historical  
fluticasone propionate (FLONASE) 50 mcg/actuation nasal spray 1 [place] by Both Nostrils route two (2) times daily as needed for Rhinitis. Yes Provider, Historical  
ibuprofen (MOTRIN) 600 mg tablet Take 600 mg by mouth every six (6) hours as needed for Pain. Yes Provider, Historical  
irbesartan-hydroCHLOROthiazide (AVALIDE) 300-12.5 mg per tablet Take 1 Tab by mouth daily. Yes Provider, Historical  
aspirin delayed-release 81 mg tablet Take 81 mg by mouth daily. 1/13/15  Yes Provider, Historical  
carvedilol (COREG CR) 80 mg CR capsule Take 80 mg by mouth daily (with breakfast). 1/14/15  Yes Provider, Historical  
clopidogrel (PLAVIX) 75 mg tablet Take 75 mg by mouth daily. 1/13/15  Yes Provider, Historical  
potassium chloride (K-DUR, KLOR-CON) 20 mEq tablet Take 20 mEq by mouth daily. 1/13/15  Yes Provider, Historical  
ezetimibe (ZETIA) 10 mg tablet Take 10 mg by mouth daily. Yes Provider, Historical  
diclofenac (VOLTAREN) 1 % gel Apply  to affected area four (4) times daily.  5/6/19   Paulina Vieyra MD  
NITROSTAT 0.4 mg SL tablet 0.4 mg by SubLINGual route every five (5) minutes as needed for Chest Pain. 1/13/15   Provider, Historical  
 
 
No results found for this or any previous visit (from the past 24 hour(s)).

## 2019-05-20 NOTE — PROGRESS NOTES
Patient resting comfortably in bed. Head to toes assessment are as charted. Patient denies pain at this time. Pain assessment on going. Falls prevention maintained. Care plan reviewed and patient expressed understanding. Family at bedside. Call light and belongings within reach. Will continue to monitor.

## 2019-05-20 NOTE — PROGRESS NOTES
Hospitalist Progress Note NAME: Ryan Gunter :  1945 MRN:  [id number] Assessment / Plan: 
Acute cva cerebellar vermis -  
cont with plavix, aspirin,statin PTOT 
ECHO concerning for Left Apical Thrombus - Lovenox therpaeutic for Embolic source from Lv thrombus 
-Neuro on board LV apical Thrombus on TTE Systolic CHF, EF 04-11%- NEW Cardiology evalv pending Coreg  [address] cardiology opinion regarding Antiplatelets/anticoagulation On lovenox Hyperlipidemia -  lipitor 40mg po daily. Mild total hyperbilirubinemia 
-improving . History of coronary artery disease status post stents in the past 
Dyslipidemia History of CVA Hypertension 
 -blood pressure controlled 
- home aspirin, Plavix, statin 
  
  
  
Code Status: full Surrogate Decision Maker: Sister Sophie Leija 
  
DVT Prophylaxis: Lovenox GI Prophylaxis: not indicated 
  
Baseline: From home independent Home with HH and PT. Subjective: Chief Complaint / Reason for Physician Visit \"worried about his condition, but not dizzy anymore. Discussed with RN events overnight. Review of Systems: 
Symptom Y/N Comments  Symptom Y/N Comments Fever/Chills n   Chest Pain n   
Poor Appetite n   Edema n   
Cough n   Abdominal Pain n   
Sputum n   Joint Pain SOB/BRAHSER n   Pruritis/Rash Nausea/vomit n   Tolerating PT/OT Diarrhea    Tolerating Diet Constipation    Other Could NOT obtain due to:   
 
Objective: VITALS:  
Last 24hrs VS reviewed since prior progress note. Most recent are: 
Patient Vitals for the past 24 hrs: 
 Temp Pulse Resp BP SpO2  
19 1525 98.3 °F (36.8 °C) 81 18 140/89 100 % 19 1150 98.5 °F (36.9 °C) 88 18 130/89 99 % 19 1000  85  (!) 140/91   
19 0739 98.7 °F (37.1 °C) 64 18 150/68 97 % 19 0500 98.1 °F (36.7 °C) 73 20 (!) 145/94 100 % 19 0012 98.2 °F (36.8 °C) 68 20 134/46 100 % 05/19/19 2109 98.3 °F (36.8 °C) 79 18 129/82 97 % Intake/Output Summary (Last 24 hours) at 5/20/2019 1610 Last data filed at 5/20/2019 1118 Gross per 24 hour Intake  Output 550 ml Net -550 ml PHYSICAL EXAM: 
General: WD, WN. Alert, cooperative, no acute distress   
EENT:  EOMI. Anicteric sclerae. MMM Resp:  CTA bilaterally, no wheezing or rales. No accessory muscle use CV:  Regular  rhythm,  No edema GI:  Soft, Non distended, Non tender.  +Bowel sounds Neurologic:  Alert and oriented X 3, normal speech, Psych:   Good insight. Not anxious nor agitated Skin:  No rashes. No jaundice Reviewed most current lab test results and cultures  YES Reviewed most current radiology test results   YES Review and summation of old records today    NO Reviewed patient's current orders and MAR    YES 
PMH/ reviewed - no change compared to H&P 
________________________________________________________________________ Care Plan discussed with: 
  Comments Patient x Family RN Care Manager Consultant Multidiciplinary team rounds were held today with , nursing, pharmacist and clinical coordinator. Patient's plan of care was discussed; medications were reviewed and discharge planning was addressed. ________________________________________________________________________ Total NON critical care TIME:  25   Minutes Total CRITICAL CARE TIME Spent:   Minutes non procedure based Comments >50% of visit spent in counseling and coordination of care    
________________________________________________________________________ Jeremy Guerrero MD  
 
Procedures: see electronic medical records for all procedures/Xrays and details which were not copied into this note but were reviewed prior to creation of Plan. LABS: 
I reviewed today's most current labs and imaging studies. Pertinent labs include: 
Recent Labs 05/19/19 2127 WBC 3.8*  
 HGB 13.3 HCT 41.5  Recent Labs 05/19/19 
4718   
K 3.5  CO2 27 * BUN 14  
CREA 1.02  
CA 8.2* ALB 3.2* TBILI 1.1*  
SGOT 18 ALT 16  
 
 
Signed: Ross Mcnair MD

## 2019-05-20 NOTE — PROGRESS NOTES
Bedside shift change report given to Robyn (oncoming nurse) by Elizabeth (offgoing nurse). Report included the following information [facility]. 
  
[facility] DAJVS:   0361 
  
  
Significant changes during shift:  None 
  
  
  
Patient Information 
  Lesli Finley 68 y.o. 
5/16/2019  8:47 AM by Samson Goldstein admitted from Home 
  
Problem List 
  
       
Patient Active Problem List  
  Diagnosis Date Noted  TIA (transient ischemic attack) 05/16/2019  Stenosis of both internal carotid arteries 12/14/2016  Cerebral infarction involving right middle cerebral artery ([address] 12/14/2016  Stroke (cerebrum) ([address] 12/13/2016  Rotator cuff tear, left 03/05/2014  Anxiety state, unspecified 08/27/2013  Cataract cortical, senile 08/27/2013  Unspecified reason for consultation 08/27/2013  Hypertensive retinopathy 08/27/2013  Observation for other specified suspected conditions 08/27/2013  Hypercholesterolemia 11/28/2011  CAD (coronary artery disease) 11/28/2011  
 HTN (hypertension), benign 11/28/2011  
  
       
Past Medical History:  
Diagnosis Date  Anxiety state, unspecified 8/27/2013  Arthritis    
 CAD (coronary artery disease)    
 Cataract cortical, senile 8/27/2013  Heart failure ([address]    
  MI 1993  Hypertension    
 Hypertensive retinopathy 8/27/2013  Observation for other specified suspected conditions 8/27/2013  
  Observation of [facility] Program Patient  Other ill-defined conditions(799.89)    
  chronic back pain  Other ill-defined conditions(799.89)    
  AGENT ORANGE EXPOSURE,   
 Psychiatric disorder    
  PTSD, NO MEDS  Stroke ([address]    
  TIA R HAND, RESOLVED  Unspecified adverse effect of anesthesia    
  SLOW TO WAKE, AGITATED WHEN HE WAKES  
 Unspecified reason for consultation 8/27/2013  
  Health maintenance  
  
  
  
Core Measures: 
  
CVA: Yes Yes CHF:No No 
PNA:No No 
  
Post Op Surgical (If Applicable):  
  
 NActivity Status: 
  
OOB to [employer] Ambulated this shift Yes  
Bed Rest No 
  
Supplemental C7: (YX Applicable) 
  
NC No 
NRB No 
Venti-mask No 
On  Liters/min 
  
  
LINES AND DRAINS:PIV 
  
  
  
DVT prophylaxis: 
  
DVT prophylaxis Med- Yes DVT prophylaxis SCD or LIV- Yes  
  
Wounds: (If Applicable) 
  
Wounds- No 
  
Location  
  
Patient Safety: 
  
Falls Score Total Score: 3 Safety Level_______ Bed Alarm On? Yes Sitter? No 
  
Plan for upcoming shift: Safey, pain control, and continue dcp.  
  
  
  
Discharge Plan: Yes INpatient rehab 
  
Active Consults: 
IP CONSULT TO HOSPITALIST 
IP CONSULT TO NEUROLOGY

## 2019-05-20 NOTE — PROGRESS NOTES
Problem: Self Care Deficits Care Plan (Adult) Goal: *Acute Goals and Plan of Care (Insert Text) Description Occupational Therapy Goals: 
Initiated 5/17/2019 1. Patient will perform grooming standing with item retrieval with independence within 7 days. 2. Patient will perform toileting with independence within 7 days. 3. Patient will perform upper body dressing and lower body dressing with independence within 7 days. 4. Patient will perform one IADL task in standing with independence within 7 days. 5. Patient will transfer from toilet with independence within 7 days. Outcome: Progressing Towards Goal 
  
OCCUPATIONAL THERAPY TREATMENT Patient: Parker Vitale (66 y.o. male) Date: 5/20/2019 Diagnosis: TIA (transient ischemic attack) [G45.9] TIA (transient ischemic attack) [G45.9] <principal problem not specified> Precautions: Fall Chart, occupational therapy assessment, plan of care, and goals were reviewed. ASSESSMENT: 
Cleared by RN to see pt for therapy session. Pt received seated in chair, pleasant and agreeable to participate. BUE ROM and strength WFL. Using leg crossover technique pt doffed/donned socks with supervision, mild difficulty RLE due to baseline knee pain. Ambulated around room with standby assist-CGA, mildly unsteady but no LOB. Able to retrieve ADL items and perform standing grooming ADLs at sink with supervision. Toilet transfer with CGA and using grabbar. Returned to chair at end of session with call bell in reach and needs met. Pt with improved ADL performance and mobility from evaluation. He reports he would be able to stay with his girlfriend who is retired at discharge. Recommend outpatient therapy for balance and strengthening at discharge. Progression toward goals: 
?       Improving appropriately and progressing toward goals ? Improving slowly and progressing toward goals ? Not making progress toward goals and plan of care will be adjusted PLAN: 
Patient continues to benefit from skilled intervention to address the above impairments. Continue treatment per established plan of care. Discharge Recommendations:  Outpatient Further Equipment Recommendations for Discharge:  none for OT SUBJECTIVE:  
Patient stated ? It feels good to sit up in the chair. ? OBJECTIVE DATA SUMMARY:  
Cognitive/Behavioral Status: 
Neurologic State: Alert Orientation Level: Oriented X4 Cognition: Follows commands Perception: Appears intact Perseveration: No perseveration noted Safety/Judgement: Awareness of environment; Fall prevention Functional Mobility and Transfers for ADLs: 
Bed Mobility: 
 Received and returned to chair Transfers: 
Sit to Stand: Stand-by assistance Functional Transfers Toilet Transfer : Contact guard assistance; Adaptive equipment(grabbar) Balance: 
Sitting: Intact; Without support Standing: Impaired; With support Standing - Static: Good Standing - Dynamic : Fair ADL Intervention: 
 Pt able to gather items from surface below waist and transfer to higher shelf when ADL was complete without LOB. Grooming Brushing Teeth: Set-up; Supervision(standing at sink) Lower Body Dressing Assistance Socks: Supervision Leg Crossed Method Used: Yes Position Performed: Seated in chair Cognitive Retraining Safety/Judgement: Awareness of environment; Fall prevention Pain: 
Pain Scale 1: Numeric (0 - 10) Pain Intensity 1: 0 Activity Tolerance:  
Good Please refer to the flowsheet for vital signs taken during this treatment. After treatment:  
? Patient left in no apparent distress sitting up in chair ? Patient left in no apparent distress in bed 
? Call bell left within reach ? Nursing notified ? Caregiver present ? Bed alarm activated COMMUNICATION/COLLABORATION:  
The patient?s plan of care was discussed with: Physical Therapist and Registered Nurse Walter Dean OT Time Calculation: 23 mins

## 2019-05-20 NOTE — PROGRESS NOTES
Problem: Falls - Risk of 
Goal: *Absence of Falls Description Document Riki Ortega Fall Risk and appropriate interventions in the flowsheet. Outcome: Progressing Towards Goal 
  
Problem: Patient Education: Go to Patient Education Activity Goal: Patient/Family Education Outcome: Progressing Towards Goal 
  
Problem: Patient Education: Go to Patient Education Activity Goal: Patient/Family Education Outcome: Progressing Towards Goal

## 2019-05-20 NOTE — PROGRESS NOTES
Problem: Mobility Impaired (Adult and Pediatric) Goal: *Acute Goals and Plan of Care (Insert Text) Description Physical Therapy Goals Initiated 5/17/2019 1. Patient will move from supine to sit and sit to supine , scoot up and down and roll side to side in bed with independence within 7 day(s). 2.  Patient will transfer from bed to chair and chair to bed with independence using the least restrictive device within 7 day(s). 3.  Patient will perform sit to stand with independence within 7 day(s). 4.  Patient will ambulate with independence for 150 feet with the least restrictive device within 7 day(s). 5.  Patient will ascend/descend 12 stairs with one sided handrail(s) with modified independence within 7 day(s). 6.  Patient will improve Krishnamurthy Balance score by 7 points within 7 days. Outcome: Progressing Towards Goal 
 PHYSICAL THERAPY TREATMENT Patient: Regi Moreland (73 y.o. male) Date: 5/20/2019 Primary Diagnosis: TIA (transient ischemic attack) [G45.9] TIA (transient ischemic attack) [G45.9] Precautions:   Fall ASSESSMENT Based on the objective data described below, the patient presents with impaired dynamic standing balance, occasional L trunk lean, gait deviations, and overall decreased functional mobility compared to baseline. Prior Level of Function: independent with ADLs and mobility Current Level of Function (mobility/balance, ADL): SBA for sit<>stand transfers. CGA for ambulation with no AD; 275 ft. Occasional Srinivasa needed for L trunk lean to maintain balance with noted path deviations. Completed 12 stairs using R handrail requiring CGA. Other factors to consider for discharge: lives alone, impaired dynamic balance, fall risk Patient will benefit from skilled therapy intervention to address the above noted impairments.   The current recommendation for discharge is OP neuro for further balance training, in order for the patient to meet his/her long term goals. Pt reports he can live with his girlfriend initially when discharged and she is able to drive him to and from appointments if needed. CM notified with current discharge recommendation. PLAN : 
Recommendations and Planned Interventions: transfer training, gait training, therapeutic exercises, neuromuscular re-education, patient and family training/education and therapeutic activities Frequency/Duration: Patient will be followed by physical therapy  5 times a week to address goals. SUBJECTIVE:  
Patient stated ? My walking does feel different, it feels weird but I know I am less steady. ? OBJECTIVE DATA SUMMARY:  
HISTORY:   
Past Medical History:  
Diagnosis Date Anxiety state, unspecified 8/27/2013 Arthritis CAD (coronary artery disease) Cataract cortical, senile 8/27/2013 Heart failure ([facility] [address] [place] 1993 Hypertension Hypertensive retinopathy 8/27/2013 Observation for other specified suspected conditions 8/27/2013 [facility] Other ill-defined conditions(799.89) chronic back pain Other ill-defined conditions(799.89) AGENT ORANGE EXPOSURE, Psychiatric disorder PTSD, NO MEDS Stroke [facility]) TIA R HAND, RESOLVED Unspecified adverse effect of anesthesia SLOW TO WAKE, AGITATED WHEN HE WAKES Unspecified reason for consultation 8/27/2013 Health maintenance Past Surgical History:  
Procedure Laterality Date ABDOMEN SURGERY PROC UNLISTED    
 x2 hernia repairs CARDIAC SURG PROCEDURE UNLIST    
 stent, cardiologist Dr. Power January HX ORTHOPAEDIC Right ROTATOR CUFF X2 Prior Level of Function/Home Situation: independent with ADLs and mobility. Pt lives alone but has a girlfriend who lives nearby and pt is going to live with her at discharge for increased support. Personal factors and/or comorbidities impacting plan of care: lives alone, impaired dynamic balance, fall risk Home Situation Home Environment: Private residence # Steps to Enter: 4 Rails to Enter: No(wall on R) One/Two Story Residence: Two story # of Interior Steps: 12 Interior Rails: Right Lift Chair Available: No 
Living Alone: Yes Support Systems: Family member(s)(and girlfriend ) Patient Expects to be Discharged to[de-identified] Private residence Current DME Used/Available at Home: Cane, straight Tub or Shower Type: Tub/Shower combination EXAMINATION/PRESENTATION/DECISION MAKING:  
Critical Behavior: 
Neurologic State: Alert Orientation Level: Oriented X4 Cognition: Follows commands Safety/Judgement: Awareness of environment, Fall prevention, Home safety Hearing: Auditory Auditory Impairment: None Functional Mobility: 
Transfers: 
Sit to Stand: Stand-by assistance Stand to Sit: Stand-by assistance Balance:  
Sitting: Intact; Without support Standing: Impaired; With support Standing - Static: Good Standing - Dynamic : Fair Ambulation/Gait Training: 
Distance (ft): 275 Feet (ft) Assistive Device: Gait belt Ambulation - Level of Assistance: Contact guard assistance;Minimal assistance Gait Description (WDL): Exceptions to [facility] Gait Abnormalities: Ataxic;Decreased step clearance; Path deviations;Trunk sway increased(L trunk lean) Base of Support: Widened Speed/Veronika: Pace decreased (<100 feet/min) Step Length: Right shortened;Left shortened Stairs: 
Number of Stairs Trained: 12 
Stairs - Level of Assistance: Contact guard assistance Rail Use: Right Activity Tolerance:  
Good Please refer to the flowsheet for vital signs taken during this treatment. After treatment patient left:  
Up in chair Bed alarm/tab alert on Bed/Chair-wheels locked Call light within reach RN notified COMMUNICATION/EDUCATION:  
The patient?s plan of care was discussed with: Registered Nurse and .  
 
Fall prevention education was provided and the patient/caregiver indicated understanding., Patient/family have participated as able in goal setting and plan of care. and Patient/family agree to work toward stated goals and plan of care. Thank you for this referral. 
Mali Gifford Time Calculation: 23 mins Regarding student involvement in patient care: A student participated in this treatment session. Per CMS Medicare statements and APTA guidelines I certify that the following was true: 1. I was present and directly observed the entire session. 2. I made all skilled judgments and clinical decisions regarding care. 3. I am the practitioner responsible for assessment, treatment, and documentation.

## 2019-05-20 NOTE — PROGRESS NOTES
Bedside shift change report given to CECILY MCKEON [facility] HANSELTL (oncoming nurse) by Marlo (offgoing nurse). Report included the following information AR. 
  
Zone RYOTY:   3757 
  
  
Significant changes during shift:  None 
  
  
  
Patient Information 
  Shae Boyer 68 y.o. 
5/16/2019  8:47 AM by Christian Pandey admitted from Home 
  
Problem List 
  
       
Patient Active Problem List  
  Diagnosis Date Noted  TIA (transient ischemic attack) 05/16/2019  Stenosis of both internal carotid arteries 12/14/2016  Cerebral infarction involving right middle cerebral artery ([address] 12/14/2016  Stroke (cerebrum) ([address] 12/13/2016  Rotator cuff tear, left 03/05/2014  Anxiety state, unspecified 08/27/2013  Cataract cortical, senile 08/27/2013  Unspecified reason for consultation 08/27/2013  Hypertensive retinopathy 08/27/2013  Observation for other specified suspected conditions 08/27/2013  Hypercholesterolemia 11/28/2011  CAD (coronary artery disease) 11/28/2011  
 HTN (hypertension), benign 11/28/2011  
  
       
Past Medical History:  
Diagnosis Date  Anxiety state, unspecified 8/27/2013  Arthritis    
 CAD (coronary artery disease)    
 Cataract cortical, senile 8/27/2013  Heart failure ([address]    
  [place] 1993  Hypertension    
 Hypertensive retinopathy 8/27/2013  Observation for other specified suspected conditions 8/27/2013  
  Observation of [facility] Program Patient  Other ill-defined conditions(799.89)    
  chronic back pain  Other ill-defined conditions(799.89)    
  AGENT ORANGE EXPOSURE,   
 Psychiatric disorder    
  PTSD, NO MEDS  Stroke ([address]    
  TIA R HAND, RESOLVED  Unspecified adverse effect of anesthesia    
  SLOW TO WAKE, AGITATED WHEN HE WAKES  
 Unspecified reason for consultation 8/27/2013  
  Health maintenance  
  
  
  
Core Measures: 
  
CVA: Yes Yes CHF:No No 
PNA:No No 
  
Post Op Surgical (If Applicable):  
  
 NActivity Status: 
  
OOB to Njuice Ambulated this shift Yes  
Bed Rest No 
  
Supplemental C6: (BD Applicable) 
  
NC No 
NRB No 
Venti-mask No 
On  Liters/min 
  
  
LINES AND DRAINS:PIV 
  
  
  
DVT prophylaxis: 
  
DVT prophylaxis Med- Yes DVT prophylaxis SCD or LIV- Yes  
  
Wounds: (If Applicable) 
  
Wounds- No 
  
Location  
  
Patient Safety: 
  
Falls Score Total Score: 3 Safety Level_______ Bed Alarm On? Yes Sitter? No 
  
Plan for upcoming shift: Safey, pain control, and continue dcp.  
  
  
  
Discharge Plan: Yes INpatient rehab 
  
Active Consults: 
IP CONSULT TO HOSPITALIST 
IP CONSULT TO NEUROLOGY

## 2019-05-20 NOTE — PROGRESS NOTES
Pharmacy Automatic Direct Oral Anticoagulant Renal Dosing Protocol Patient currently receiving Rivaroxaban 20 mg daily with an indication of A-Fib. Start date: 5/20 Major interacting medications: none significant Platelet inhibitors (dose/frequency): ASA 81mg/day Labs: 
Recent Labs 05/19/19 
6145 CREA 1.02  
HGB 13.3  Wt Readings from Last 1 Encounters:  
05/18/19 71.7 kg (158 lb) Creatinine Clearance: 66 ml/min Impression/Plan: - Rivaroxaban 20mg daily is dosed appropriately based on indication and renal function - BMP & CBC w/o diff every other day Pharmacy will follow daily and adjust as appropriate. Thank you, 
Devora Valles, [facility] Child Mckeon Score calculator 
[employer]. Apixaban Dabigatran Edoxaban Rivaroxaban 
 
[email or web address]. pdf

## 2019-05-21 PROBLEM — G45.9 TIA (TRANSIENT ISCHEMIC ATTACK): Status: RESOLVED | Noted: 2019-01-01 | Resolved: 2019-01-01

## 2019-05-21 PROBLEM — I51.3 LV (LEFT VENTRICULAR) MURAL THROMBUS: Status: ACTIVE | Noted: 2019-01-01

## 2019-05-21 PROBLEM — I69.993 CVA, OLD, ATAXIA: Status: RESOLVED | Noted: 2019-01-01 | Resolved: 2019-01-01

## 2019-05-21 PROBLEM — I51.3 LV (LEFT VENTRICULAR) MURAL THROMBUS: Status: RESOLVED | Noted: 2019-01-01 | Resolved: 2019-01-01

## 2019-05-21 PROBLEM — I69.993 CVA, OLD, ATAXIA: Status: ACTIVE | Noted: 2019-01-01

## 2019-05-21 NOTE — DISCHARGE SUMMARY
Hospitalist Discharge Summary Patient ID: Gena Vieyra [id number] 
86 y.o. 
1945 5/16/2019 PCP on record: Mary Munoz MD 
 
Admit date: 5/16/2019 Discharge date and time: 5/21/2019 DISCHARGE DIAGNOSIS: 
 
Acute cva cerebellar vermis -   
Systolic CHF, EF 77-83%- NEW Hyperlipidemia Mild total hyperbilirubinemia History of coronary artery disease status post stents in the past 
Dyslipidemia History of CVA Hypertension 
  
 
CONSULTATIONS: 
IP CONSULT TO HOSPITALIST 
IP CONSULT TO NEUROLOGY 
IP CONSULT TO CARDIOLOGY Excerpted HPI from H&P of [facility] DO: This is a 46-year-old male with past medical history of CVA, CAD is coming to the hospital with chief complaints of dizziness and vomiting.  Patient reports being in his usual state of health until last night at around 8 PM when he started having dizziness and spinning sensation like as if the room is spinning around him. [facility] also felt lightheaded and unsteady.  He does not report any recent ear trauma or ear infection.  Does not report similar episodes in the past. [facility] does not report any focal weakness, tingling, numbness, facial deviation or slurred speech.  Does not report any vision deficits.  Upon presenting to the emergency department here he was made to walk and was leaning to the left side which prompted ER physician to do a stroke work-up.  Patient also reported nausea along with 2 episodes of vomitings which are clear. [facility] does not report any abdominal pain, diarrhea or constipation. On arrival to the hospital he was slightly tachycardic and blood pressure was 156/94.  CBC was within normal limits.  BMP was also normal.  He had a CT head which showed evidence of no acute process but CT angiogram showed moderate proximal short segment narrowing of the left vertebral artery which is patent distally with moderate to severe cervical spondylosis and bilateral thyroid nodules ______________________________________________________________________ DISCHARGE SUMMARY/HOSPITAL COURSE:  for full details see H&P, daily progress notes, labs, consult notes. Acute cva cerebellar vermis -  
MRI May 16, Small Infarct Cerebellar vermis 
cont with plavix, aspirin,statin- Plavix was DCed as pt will be on xarelto 
 Ptot.  -recommned Home with Outpatient PT 
 
ECHO concerning for Left Apical Thrombus- Placed on Xarelto 20 mg daily 
-Neuro was on board 
  
Systolic CHF, EF 58-00%- NEW Cardiology evalv Coreg,ACEI Follow with his Cardiology at [facility] 
  
Hyperlipidemia -  on lipitor 40mg po daily. Mild total hyperbilirubinemia 
-stable, improved History of coronary artery disease status post stents in the past 
Dyslipidemia History of CVA Hypertension 
 - resumed his home BP meds. 
  
  
 
 
_______________________________________________________________________ Patient seen and examined by me on discharge day. Pertinent Findings: 
Gen:    Not in distress Chest: Clear lungs CVS:   Regular rhythm. No edema Abd:  Soft, not distended, not tender Neuro:  Alert, orientedx3 
_______________________________________________________________________ DISCHARGE MEDICATIONS:  
Current Discharge Medication List  
  
START taking these medications Details  
rivaroxaban (XARELTO) 20 mg tab tablet Take 1 Tab by mouth daily. Qty: 30 Tab, Refills: 0 CONTINUE these medications which have NOT CHANGED Details  
amLODIPine (NORVASC) 10 mg tablet Take 10 mg by mouth daily. atorvastatin (LIPITOR) 40 mg tablet Take 40 mg by mouth nightly. fluticasone propionate (FLONASE) 50 mcg/actuation nasal spray 1 [place] by Both Nostrils route two (2) times daily as needed for Rhinitis. irbesartan-hydroCHLOROthiazide (AVALIDE) 300-12.5 mg per tablet Take 1 Tab by mouth daily. aspirin delayed-release 81 mg tablet Take 81 mg by mouth daily. Refills: 2 carvedilol (COREG CR) 80 mg CR capsule Take 80 mg by mouth daily (with breakfast). Refills: 5  
  
potassium chloride (K-DUR, KLOR-CON) 20 mEq tablet Take 20 mEq by mouth daily. Refills: 5  
  
ezetimibe (ZETIA) 10 mg tablet Take 10 mg by mouth daily. Associated Diagnoses: Hypercholesterolemia; Anemia; Benign prostate hyperplasia; Prostate cancer screening; CAD (coronary artery disease); HTN (hypertension), benign NITROSTAT 0.4 mg SL tablet 0.4 mg by SubLINGual route every five (5) minutes as needed for Chest Pain. Refills: 3 STOP taking these medications  
  
 ibuprofen (MOTRIN) 600 mg tablet Comments:  
Reason for Stopping:   
   
 clopidogrel (PLAVIX) 75 mg tablet Comments:  
Reason for Stopping:   
   
 diclofenac (VOLTAREN) 1 % gel Comments:  
Reason for Stopping:   
   
  
 
 
 
Patient Follow Up Instructions: Activity: Activity as tolerated and no driving for today Diet: Cardiac Diet Wound Care: None needed Follow-up Information Follow up With Specialties Details Why Contact Info Kyra Beltran MD Internal Medicine   [address] 
[phone] [place] Cardiologist  In 2 weeks    
  
 
________________________________________________________________ Risk of deterioration: Low 
 
Condition at Discharge:  Stable 
__________________________________________________________________ Disposition Home with family and home health services 
 
____________________________________________________________________ Code Status: Full Code 
___________________________________________________________________ Total time in minutes spent coordinating this discharge (includes going over instructions, follow-up, prescriptions, and preparing report for sign off to her PCP) :  35 minutes Signed: 
Jan Pink MD

## 2019-05-21 NOTE — DISCHARGE INSTRUCTIONS
HOSPITALIST DISCHARGE INSTRUCTIONS    NAME: Torrie Brar   :  1945   MRN:  [id number]     Date/Time:  2019 8:22 AM    ADMIT DATE: 2019   DISCHARGE DATE: 2019     Discharge Diagnosis:    Acute cva cerebellar vermis -    Systolic CHF, EF 76-40%- NEW  LV Apical Thrombus  Hyperlipidemia   History of coronary artery disease status post stents in the past  Dyslipidemia  History of CVA  Hypertension  Baseline: From home independent    · It is important that you take the medication exactly as they are prescribed. · Keep your medication in the bottles provided by the pharmacist and keep a list of the medication names, dosages, and times to be taken in your wallet. · Do not take other medications without consulting your doctor. What to do at [address]:  Cardiac Diet    Recommended activity: Activity as tolerated and no driving for today      If you have questions regarding the hospital related prescriptions or hospital related issues please call [facility] at [phone]. You can always direct your questions to your primary care doctor if you are unable to reach your hospital physician; your PCP works as an extension of your hospital doctor just like your hospital doctor is an extension of your PCP for your time at the hospital [facility], [facility])    If you experience any of the following symptoms then please call your primary care physician or return to the emergency room if you cannot get hold of your doctor:    Fever, chills, nausea, vomiting, or persistent diarrhea  Worsening weakness or new problems with your speech or balance  Dark stools or visible blood in your stools  New Leg swelling or shortness of breath as these could be signs of a clot    Additional Instructions:      Bring these papers with you to your follow up appointments.  The papers will help your doctors be sure to continue the care plan from the hospital.              Information obtained by :  I understand that if any problems occur once I am at home I am to contact my physician. I understand and acknowledge receipt of the instructions indicated above.                                                                                                                                            Physician's or R.N.'s Signature                                                                  Date/Time                                                                                                                                              Patient or Representative Signature

## 2019-05-21 NOTE — PROGRESS NOTES
Patient discharged per MD order. Patient expressed understanding of discharged instructions and prescriptions. All of patient's belongings with patient.

## 2019-05-21 NOTE — PROGRESS NOTES
Plan: Home with outpatient services ([facility]). Girlfriend will transport home at 15 noon. Pt does not have keys to home or car. CM spoke with pt regarding d/c plan. Pt will discharge with outpatient rehab for balance. Dr. Stephen Duong signed referral form and form sent to [address] [place] [address]. Copy of form given to patient. Pt plans to stay with his girlfriend after discharge for additional safety and support. Pt does not have keys to home and pt ride home will not arrive until noon. Pt is not concerned about obtaining Xarelto  and asked that CM not complete price check or assist with discounts. Pt states that his workers comp from previous job covers the cost of his heart related medications. RN present for this discussion. Care Management has completed the discharge planning needs of the patient at this time. Care Management Interventions PCP Verified by CM: Yes Mode of Transport at Discharge: Other (see comment) Transition of Care Consult (CM Consult): Discharge Planning Physical Therapy Consult: Yes Occupational Therapy Consult: Yes Current Support Network: Family Lives [place], Lives Alone Confirm Follow Up Transport: Family Plan discussed with Pt/Family/Caregiver: Yes Discharge Location Discharge Placement: Home with outpatient services Steve Cervantes, Care Manager [phone]

## 2019-05-22 NOTE — ED PROVIDER NOTES
EMERGENCY DEPARTMENT HISTORY AND PHYSICAL EXAM 
 
 
Date: 5/22/2019 Patient Name: Rj Stewart Patient Age and Sex: 68 y.o. male History of Presenting Illness Chief Complaint Patient presents with  Dizziness History Provided By: Patient and EMS 
 
HPI: Rj Stewart, 68 y.o. male with PMHx significant for HTN, CAD, TIA, heart failure, presents via EMS transport to the ED after having a near syncopal episode this afternoon PTA. Per EMS, pt was hypotensive upon arrival but pt reports feeling better upon arrival to the ED after EMS gave him 500 cc of fluids. Pt states that he had a sudden episode of dizziness, got up to go to find family, and while he was standing outside he had a near syncopal episode. He notes that his family helped him down to the ground and called EMS. Pt denies any LOC. Pt states he was recently admitted on 5/16 for acute CVA. He reports compliance of all his medications since discharge including Xarelto. Pt denies any other alleviating or exacerbating factors. Additionally, pt specifically denies any recent fever, chills, headache, vomiting, diarrhea, abdominal pain, CP, SOB, BLE swelling, heart palpitations, urinary sxs, changes in BM, changes in PO intake, melena, hematochezia, cough, or congestion. PCP: Rissa Abdi MD 
 
There are no other complaints, changes or physical findings at this time. Past History Past Medical History: 
Past Medical History:  
Diagnosis Date  Anxiety state, unspecified 8/27/2013  Arthritis  CAD (coronary artery disease)  Cataract cortical, senile 8/27/2013  Heart failure ([facility] [address] [place] 1993  Hypertension  Hypertensive retinopathy 8/27/2013  Observation for other specified suspected conditions [facility]  Other ill-defined conditions(799.89) chronic back pain  Other ill-defined conditions(799.89) AGENT ORANGE EXPOSURE,   
 Psychiatric disorder PTSD, NO MEDS  Stroke ([facility] [address] TIA R HAND, RESOLVED  Unspecified adverse effect of anesthesia SLOW TO WAKE, AGITATED WHEN HE WAKES  
 Unspecified reason for consultation 8/27/2013 Health maintenance Past Surgical History: 
Past Surgical History:  
Procedure Laterality Date  ABDOMEN SURGERY PROC UNLISTED    
 x2 hernia repairs  CARDIAC SURG PROCEDURE UNLIST    
 stent, cardiologist Dr. Magali Alfonso  HX ORTHOPAEDIC Right ROTATOR CUFF X2 Family History: 
Family History Problem Relation Age of Onset  Cancer Mother  Heart Attack Mother  Cancer Father  Anesth Problems Neg Hx Social History: 
Social History Tobacco Use  Smoking status: Former Smoker Packs/day: 0.50 Years: 19.00 Pack years: 9.50  Smokeless tobacco: Former User Quit date: 2/28/1984 Substance Use Topics  Alcohol use: No  
 Drug use: No  
 
 
Allergies: Allergies Allergen Reactions  Darvocet A500 [Propoxyphene N-Acetaminophen] Other (comments)  
  floaty/high feeling Medications: No current facility-administered medications on file prior to encounter. Current Outpatient Medications on File Prior to Encounter Medication Sig Dispense Refill  rivaroxaban (XARELTO) 20 mg tab tablet Take 1 Tab by mouth daily. 30 Tab 0  
 amLODIPine (NORVASC) 10 mg tablet Take 10 mg by mouth daily.  atorvastatin (LIPITOR) 40 mg tablet Take 40 mg by mouth nightly.  fluticasone propionate (FLONASE) 50 mcg/actuation nasal spray 1 [place] by Both Nostrils route two (2) times daily as needed for Rhinitis.  irbesartan-hydroCHLOROthiazide (AVALIDE) 300-12.5 mg per tablet Take 1 Tab by mouth daily.  aspirin delayed-release 81 mg tablet Take 81 mg by mouth daily. 2  
 carvedilol (COREG CR) 80 mg CR capsule Take 80 mg by mouth daily (with breakfast).   5  
 NITROSTAT 0.4 mg SL tablet 0.4 mg by SubLINGual route every five (5) minutes as needed for Chest Pain. 3  
 potassium chloride (K-DUR, KLOR-CON) 20 mEq tablet Take 20 mEq by mouth daily. 5  
 ezetimibe (ZETIA) 10 mg tablet Take 10 mg by mouth daily. Review of Systems Review of Systems Constitutional: Negative. Negative for chills and fever. HENT: Negative. Negative for congestion, facial swelling, rhinorrhea, sore throat, trouble swallowing and voice change. Eyes: Negative. Respiratory: Negative. Negative for apnea, cough, chest tightness, shortness of breath and wheezing. Cardiovascular: Negative. Negative for chest pain, palpitations and leg swelling. Gastrointestinal: Negative. Negative for abdominal distention, abdominal pain, blood in stool, constipation, diarrhea, nausea and vomiting. Endocrine: Negative. Negative for cold intolerance, heat intolerance and polyuria. Genitourinary: Negative. Negative for difficulty urinating, dysuria, flank pain, frequency, hematuria and urgency. Musculoskeletal: Negative. Negative for arthralgias, back pain, myalgias, neck pain and neck stiffness. Skin: Negative. Negative for color change and rash. Neurological: Positive for dizziness, weakness and light-headedness. Negative for syncope, facial asymmetry, speech difficulty, numbness and headaches. Hematological: Negative. Does not bruise/bleed easily. Psychiatric/Behavioral: Negative. Negative for confusion and self-injury. The patient is not nervous/anxious. Physical Exam  
Physical Exam  
Constitutional: He is oriented to person, place, and time. Vital signs are normal. He appears well-developed and well-nourished. He is cooperative. Non-toxic appearance. HENT:  
Head: Normocephalic and atraumatic. Mouth/Throat: Mucous membranes are normal. No posterior oropharyngeal erythema. Eyes: Pupils are equal, round, and reactive to light. Conjunctivae and EOM are normal.  
Neck: Normal range of motion. Cardiovascular: Normal rate, regular rhythm, normal heart sounds and intact distal pulses. Exam reveals no gallop and no friction rub. No murmur heard. Pulmonary/Chest: Effort normal and breath sounds normal. No respiratory distress. He has no wheezes. He has no rales. He exhibits no tenderness. Abdominal: Soft. Bowel sounds are normal. He exhibits no distension and no mass. There is no tenderness. There is no rebound and no guarding. Musculoskeletal: Normal range of motion. He exhibits no edema, tenderness or deformity. Neurological: He is alert and oriented to person, place, and time. He displays normal reflexes. No cranial nerve deficit. He exhibits normal muscle tone. Coordination normal.  
Skin: Skin is warm. No rash noted. Psychiatric: He has a normal mood and affect. Nursing note and vitals reviewed. Diagnostic Study Results Labs - Recent Results (from the past 24 hour(s)) CBC WITH AUTOMATED DIFF Collection Time: 05/22/19  3:01 PM  
Result Value Ref Range WBC 6.1 4.1 - 11.1 K/uL  
 RBC 4.90 4. 10 - 5.70 M/uL  
 HGB 12.9 12.1 - 17.0 g/dL HCT 41.4 36.6 - 50.3 % MCV 84.5 80.0 - 99.0 FL  
 MCH 26.3 26.0 - 34.0 PG  
 MCHC 31.2 30.0 - 36.5 g/dL  
 RDW 12.5 11.5 - 14.5 % PLATELET 324 749 - 628 K/uL MPV 9.6 8.9 - 12.9 FL  
 NRBC 0.0 0  WBC ABSOLUTE NRBC 0.00 0.00 - 0.01 K/uL NEUTROPHILS 73 32 - 75 % LYMPHOCYTES 16 12 - 49 % MONOCYTES 9 5 - 13 % EOSINOPHILS 1 0 - 7 % BASOPHILS 1 0 - 1 % IMMATURE GRANULOCYTES 0 0.0 - 0.5 % ABS. NEUTROPHILS 4.4 1.8 - 8.0 K/UL  
 ABS. LYMPHOCYTES 1.0 0.8 - 3.5 K/UL  
 ABS. MONOCYTES 0.6 0.0 - 1.0 K/UL  
 ABS. EOSINOPHILS 0.1 0.0 - 0.4 K/UL  
 ABS. BASOPHILS 0.0 0.0 - 0.1 K/UL  
 ABS. IMM. GRANS. 0.0 0.00 - 0.04 K/UL  
 DF AUTOMATED METABOLIC PANEL, COMPREHENSIVE Collection Time: 05/22/19  3:01 PM  
Result Value Ref Range Sodium 141 136 - 145 mmol/L  Potassium 3.7 3.5 - 5.1 mmol/L  
 Chloride 106 97 - 108 mmol/L  
 CO2 29 21 - 32 mmol/L Anion gap 6 5 - 15 mmol/L Glucose 106 (H) 65 - 100 mg/dL BUN 22 (H) 6 - 20 MG/DL Creatinine 1.31 (H) 0.70 - 1.30 MG/DL  
 BUN/Creatinine ratio 17 12 - 20 GFR est AA >60 >60 ml/min/1.73m2 GFR est non-AA 54 (L) >60 ml/min/1.73m2 Calcium 7.8 (L) 8.5 - 10.1 MG/DL Bilirubin, total 0.8 0.2 - 1.0 MG/DL  
 ALT (SGPT) 97 (H) 12 - 78 U/L  
 AST (SGOT) 124 (H) 15 - 37 U/L Alk. phosphatase 81 45 - 117 U/L Protein, total 6.8 6.4 - 8.2 g/dL Albumin 3.2 (L) 3.5 - 5.0 g/dL Globulin 3.6 2.0 - 4.0 g/dL A-G Ratio 0.9 (L) 1.1 - 2.2 CK W/ REFLX CKMB Collection Time: 05/22/19  3:01 PM  
Result Value Ref Range  39 - 308 U/L  
TROPONIN I Collection Time: 05/22/19  3:01 PM  
Result Value Ref Range Troponin-I, Qt. <0.05 <0.05 ng/mL NT-PRO BNP Collection Time: 05/22/19  3:01 PM  
Result Value Ref Range NT pro-BNP 95 <125 PG/ML  
MAGNESIUM Collection Time: 05/22/19  3:01 PM  
Result Value Ref Range Magnesium 2.1 1.6 - 2.4 mg/dL TSH 3RD GENERATION Collection Time: 05/22/19  3:01 PM  
Result Value Ref Range TSH 1.08 0.36 - 3.74 uIU/mL T4, FREE Collection Time: 05/22/19  3:01 PM  
Result Value Ref Range T4, Free 1.2 0.8 - 1.5 NG/DL  
EKG, 12 LEAD, INITIAL Collection Time: 05/22/19  3:37 PM  
Result Value Ref Range Ventricular Rate 76 BPM  
 Atrial Rate 76 BPM  
 P-R Interval 146 ms  
 QRS Duration 76 ms  
 Q-T Interval 404 ms QTC Calculation (Bezet) 454 ms Calculated P Axis 43 degrees Calculated R Axis -18 degrees Calculated T Axis 69 degrees Diagnosis Sinus rhythm with frequent premature ventricular complexes Minimal voltage criteria for LVH, may be normal variant Anteroseptal infarct (cited on or before 18-FEB-2009) When compared with ECG of 16-MAY-2019 10:06, No significant change was found URINALYSIS W/ REFLEX CULTURE  Collection Time: 05/22/19  4:47 PM  
 Result Value Ref Range Color YELLOW/STRAW Appearance CLEAR CLEAR Specific gravity 1.017 1.003 - 1.030    
 pH (UA) 6.0 5.0 - 8.0 Protein NEGATIVE  NEG mg/dL Glucose NEGATIVE  NEG mg/dL Ketone NEGATIVE  NEG mg/dL Bilirubin NEGATIVE  NEG Blood NEGATIVE  NEG Urobilinogen 1.0 0.2 - 1.0 EU/dL Nitrites NEGATIVE  NEG Leukocyte Esterase SMALL (A) NEG    
 WBC 5-10 0 - 4 /hpf  
 RBC 0-5 0 - 5 /hpf Epithelial cells FEW FEW /lpf Bacteria 1+ (A) NEG /hpf  
 UA:UC IF INDICATED URINE CULTURE ORDERED (A) CNI Mucus 1+ (A) NEG /lpf Hyaline cast 2-5 0 - 5 /lpf Radiologic Studies -  
XR CHEST PA LAT Final Result 1. No acute process CT Results  (Last 48 hours) None CXR Results  (Last 48 hours) 05/22/19 1536  XR CHEST PA LAT Final result Impression:  1. No acute process Narrative:  Exam:  2 view chest  
   
Indication: Syncope. COMPARISON: 12/13/2016 PA and lateral views demonstrate normal heart size. The patient is on a cardiac  
monitor. The lungs are clear. No pneumonia or pulmonary edema. Patient status post right shoulder surgery. There is dextroconvex curvature of  
the thoracic spine with degenerative changes. Medical Decision Making I am the first provider for this patient. I reviewed the vital signs, available nursing notes, past medical history, past surgical history, family history and social history. Vital Signs-Reviewed the patient's vital signs. Patient Vitals for the past 24 hrs: 
 Temp Pulse Resp BP SpO2  
05/22/19 1622  79 23 112/65 98 % 05/22/19 1547  76 19 98/75 97 % 05/22/19 1449 98 °F (36.7 °C) 69 22 103/52 97 % Pulse Oximetry Analysis - 97% on RA Cardiac Monitor:  
Rate: 69 bpm 
Rhythm: Normal Sinus Rhythm ED EKG interpretation: 
Rhythm: normal sinus rhythm and PVC's; and regular .  Rate (approx.): 76; Axis: normal; P wave: normal; QRS interval: normal ; ST/T wave: normal. This EKG was interpreted by Jordyn Benson M.D. Records Reviewed: Nursing Notes, Old Medical Records, Previous electrocardiograms, Previous Radiology Studies and Previous Laboratory Studies Provider Notes (Medical Decision Making):  
Patient presenting with generalized fatigue/weakness. Stable vitals and nontoxic appearing. DDx: infection, anemia, electrolyte anomoly (hypo or hyperkalemia, hypomagnesemia), hypothyroid, dehydration, depression, CA, ACS. Will obtain EKG, UA, labwork for any urgent/emergent pathology. Will reassess and monitor while in ED. ED Course:  
Initial assessment performed. The patients presenting problems have been discussed, and they are in agreement with the care plan formulated and outlined with them. I have encouraged them to ask questions as they arise throughout their visit. HYPERTENSION COUNSELING Education was provided to the patient today regarding their hypertension. Patient is made aware of their elevated blood pressure and is instructed to follow up this week with their Primary Care for a recheck. Patient is counseled regarding consequences of chronic, uncontrolled hypertension including kidney disease, heart disease, stroke or even death. Patient states their understanding and agrees to follow up this week. Additionally, during their visit, I discussed sodium restriction, maintaining ideal body weight and regular exercise program as physiologic means to achieve blood pressure control. The patient will strive towards this. I reviewed our electronic medical record system for any past medical records that were available that may contribute to the patient's current condition, the nursing notes and vital signs from today's visit.   Chet Ramos MD 
Medications Administered During ED Course: 
Medications  
cefTRIAXone (ROCEPHIN) 1 g in 0.9% sodium chloride (MBP/ADV) 50 mL (0 g IntraVENous Transfusion Completed 5/22/19 1800) Progress Note: 
Patient has been reassessed and reports feeling better and symptoms have improved after ED treatment. Vijay Bansal is able to tolerate PO and ambulate per baseline. Ever Rouse's final labs and imaging have been reviewed with him. He has been counseled regarding his diagnosis. He verbally conveys understanding and agreement of the signs, symptoms, diagnosis, treatment and prognosis and additionally agrees to follow up as recommended with Dr. Abrahan Ladd MD in 24 - 48 hours. He also agrees with the care-plan and conveys that all of his questions have been answered. I have also put together some discharge instructions for him that include: 1) educational information regarding their diagnosis, 2) how to care for their diagnosis at home, as well a 3) list of reasons why they would want to return to the ED prior to their follow-up appointment, should their condition change. I have answered all questions to the patient's satisfaction. Strict return precautions given. He both understood and agreed with plan as discussed above. Vital signs stable for discharge. Disposition: DISCHARGE The pt is ready for discharge. The pt's signs, symptoms, diagnosis, and discharge instructions have been discussed and pt has conveyed their understanding. The pt is to follow up as recommended or return to ER should their symptoms worsen. Plan has been discussed and pt is in agreement. PLAN: 
1. Discharge Medication List as of 5/22/2019  5:28 PM  
  
START taking these medications Details  
ondansetron (ZOFRAN ODT) 4 mg disintegrating tablet Take 1 Tab by mouth every eight (8) hours as needed for Nausea. , Print, Disp-20 Tab, R-0  
  
cephALEXin (KEFLEX) 500 mg capsule Take 1 Cap by mouth four (4) times daily for 7 days. , Print, Disp-28 Cap, R-0  
  
meclizine (ANTIVERT) 25 mg chewable tablet Take 1 Tab by mouth three (3) times daily as needed for Dizziness or Nausea for up to 10 days. , Print, Disp-20 Tab, R-0  
  
  
CONTINUE these medications which have NOT CHANGED Details  
rivaroxaban (XARELTO) 20 mg tab tablet Take 1 Tab by mouth daily. , Print, Disp-30 Tab, R-0  
  
amLODIPine (NORVASC) 10 mg tablet Take 10 mg by mouth daily. , Historical Med  
  
atorvastatin (LIPITOR) 40 mg tablet Take 40 mg by mouth nightly., Historical Med  
  
fluticasone propionate (FLONASE) 50 mcg/actuation nasal spray 1 [place] by Both Nostrils route two (2) times daily as needed for Rhinitis., Historical Med  
  
irbesartan-hydroCHLOROthiazide (AVALIDE) 300-12.5 mg per tablet Take 1 Tab by mouth daily. , Historical Med  
  
aspirin delayed-release 81 mg tablet Take 81 mg by mouth daily. , Historical Med, R-2  
  
carvedilol (COREG CR) 80 mg CR capsule Take 80 mg by mouth daily (with breakfast). , Historical Med, R-5  
  
NITROSTAT 0.4 mg SL tablet 0.4 mg by SubLINGual route every five (5) minutes as needed for Chest Pain., Historical Med, R-3  
  
potassium chloride (K-DUR, KLOR-CON) 20 mEq tablet Take 20 mEq by mouth daily. , Historical Med, R-5  
  
ezetimibe (ZETIA) 10 mg tablet Take 10 mg by mouth daily. , Historical Med 2. Follow-up Information Follow up With Specialties Details Why Contact Info Anival Thacker MD Internal Medicine   [address] 
[phone] [facility] EMERGENCY DEPT Emergency Medicine  As needed, If symptoms worsen [address] [place] [address] 
[phone] Return to ED if worse Diagnosis Clinical Impression: 1. Postural dizziness 2. Urinary tract infection without hematuria, site unspecified 3. Dehydration 4. Near syncope Attestation: 
 
I personally performed the services described in this documentation on this date 5/22/2019 for patient Francis Long. I am the sole author of this note.  Samaria Clemente MD 
 
 Please note that this dictation was completed with [employer]ize, the [employer]olve voice recognition software. Quite often unanticipated grammatical, syntax, homophones, and other interpretive errors are inadvertently transcribed by the computer software. Please disregard these errors. Please excuse any errors that have escaped final proofreading. Thank you. This note will not be viewable in [address]

## 2019-05-22 NOTE — DISCHARGE INSTRUCTIONS
Thank you for allowing us to take care of you today! We hope we addressed all of your concerns and needs. We strive to provide excellent quality care in the Emergency Department. You will receive a survey after your visit to evaluate the care you were provided. Should you receive a survey from us, we invite you to share your experience and tell us what made it excellent. It was a pleasure serving you, we invite you to share your experience with us, in our pursuit for excellence, should you be selected to receive a survey. The exam and treatment you received in the Emergency Department were for an urgent problem and are not intended as complete care. It is important that you follow up with a doctor, nurse practitioner, or physician assistant for ongoing care. If your symptoms become worse or you do not improve as expected and you are unable to reach your usual health care provider, you should return to the Emergency Department. We are available 24 hours a day. Please take your discharge instructions with you when you go to your follow-up appointment. If you have any problem arranging a follow-up appointment, contact the Emergency Department immediately. If a prescription has been provided, please have it filled as soon as possible to prevent a delay in treatment. Read the entire medication instruction sheet provided to you by the pharmacy. If you have any questions or reservations about taking the medication due to side effects or interactions with other medications, please call your primary care physician or contact the ER to speak with the charge nurse. Make an appointment with your family doctor or the physician you were referred to for follow-up of this visit as instructed on your discharge paperwork, as this is mandatory follow-up. Return to the ER if you are unable to be seen or if you are unable to be seen in a timely manner.     If you have any problem arranging the follow-up visit, contact the Emergency Department immediately. I hope you feel better and thank you again for allow us to provide you with excellent care today at [facility]! Warmest regards,    Barbara Siemens, MD  Emergency Medicine Physician  [facility]              Patient Education        Dehydration: Care Instructions  Your Care Instructions  Dehydration happens when your body loses too much fluid. This might happen when you do not drink enough water or you lose large amounts of fluids from your body because of diarrhea, vomiting, or sweating. Severe dehydration can be life-threatening. Water and minerals called electrolytes help put your body fluids back in balance. Learn the early signs of fluid loss, and drink more fluids to prevent dehydration. Follow-up care is a key part of your treatment and safety. Be sure to make and go to all appointments, and call your doctor if you are having problems. It's also a good idea to know your test results and keep a list of the medicines you take. How can you care for yourself at home? · To prevent dehydration, drink plenty of fluids, enough so that your urine is light yellow or clear like water. Choose water and other caffeine-free clear liquids until you feel better. If you have kidney, heart, or liver disease and have to limit fluids, talk with your doctor before you increase the amount of fluids you drink. · If you do not feel like eating or drinking, try taking small sips of water, sports drinks, or other rehydration drinks. · Get plenty of rest.  To prevent dehydration  · Add more fluids to your diet and daily routine, unless your doctor has told you not to. · During hot weather, drink more fluids. Drink even more fluids if you exercise a lot. Stay away from drinks with alcohol or caffeine. · Watch for the symptoms of dehydration. These include:  ? A dry, sticky mouth.   ? Dark yellow urine, and not much of it. ? Dry and sunken eyes. ? Feeling very tired. · Learn what problems can lead to dehydration. These include:  ? Diarrhea, fever, and vomiting. ? Any illness with a fever, such as pneumonia or the flu. ? Activities that cause heavy sweating, such as endurance races and heavy outdoor work in hot or humid weather. ? Alcohol or drug abuse or withdrawal.  ? Certain medicines, such as cold and allergy pills (antihistamines), diet pills (diuretics), and laxatives. ? Certain diseases, such as diabetes, cancer, and heart or kidney disease. When should you call for help? Call 911 anytime you think you may need emergency care. For example, call if:    · You passed out (lost consciousness).    Call your doctor now or seek immediate medical care if:    · You are confused and cannot think clearly.     · You are dizzy or lightheaded, or you feel like you may faint.     · You have signs of needing more fluids. You have sunken eyes and a dry mouth, and you pass only a little dark urine.     · You cannot keep fluids down.    Watch closely for changes in your health, and be sure to contact your doctor if:    · You are not making tears.     · Your skin is very dry and sags slowly back into place after you pinch it.     · Your mouth and eyes are very dry. Where can you learn more? Go to http://krystle-tamia.info/. Enter D887 in the search box to learn more about \"Dehydration: Care Instructions. \"  Current as of: September 23, 2018  Content Version: 11.9  © 9433-9634 [employer]. Care instructions adapted under license by [employer] (which disclaims liability or warranty for this information). If you have questions about a medical condition or this instruction, always ask your healthcare professional. [place] [address] any warranty or liability for your use of this information.          Patient Education        Dizziness: Care Instructions  Your Care Instructions  Dizziness is the feeling of unsteadiness or fuzziness in your head. It is different than having vertigo, which is a feeling that the room is spinning or that you are moving or falling. It is also different from lightheadedness, which is the feeling that you are about to faint. It can be hard to know what causes dizziness. Some people feel dizzy when they have migraine headaches. Sometimes bouts of flu can make you feel dizzy. Some medical conditions, such as heart problems or high blood pressure, can make you feel dizzy. Many medicines can cause dizziness, including medicines for high blood pressure, pain, or anxiety. If a medicine causes your symptoms, your doctor may recommend that you stop or change the medicine. If it is a problem with your heart, you may need medicine to help your heart work better. If there is no clear reason for your symptoms, your doctor may suggest watching and waiting for a while to see if the dizziness goes away on its own. Follow-up care is a key part of your treatment and safety. Be sure to make and go to all appointments, and call your doctor if you are having problems. It's also a good idea to know your test results and keep a list of the medicines you take. How can you care for yourself at home? · If your doctor recommends or prescribes medicine, take it exactly as directed. Call your doctor if you think you are having a problem with your medicine. · Do not drive while you feel dizzy. · Try to prevent falls. Steps you can take include:  ? Using nonskid mats, adding grab bars near the tub, and using night-lights. ? Clearing your home so that walkways are free of anything you might trip on.  ? Letting family and friends know that you have been feeling dizzy. This will help them know how to help you. When should you call for help? Call 911 anytime you think you may need emergency care.  For example, call if:    · You passed out (lost consciousness).     · You have dizziness along with symptoms of a heart attack. These may include:  ? Chest pain or pressure, or a strange feeling in the chest.  ? Sweating. ? Shortness of breath. ? Nausea or vomiting. ? Pain, pressure, or a strange feeling in the back, neck, jaw, or upper belly or in one or both shoulders or arms. ? Lightheadedness or sudden weakness. ? A fast or irregular heartbeat.     · You have symptoms of a stroke. These may include:  ? Sudden numbness, tingling, weakness, or loss of movement in your face, arm, or leg, especially on only one side of your body. ? Sudden vision changes. ? Sudden trouble speaking. ? Sudden confusion or trouble understanding simple statements. ? Sudden problems with walking or balance. ? A sudden, severe headache that is different from past headaches.    Call your doctor now or seek immediate medical care if:    · You feel dizzy and have a fever, headache, or ringing in your ears.     · You have new or increased nausea and vomiting.     · Your dizziness does not go away or comes back.    Watch closely for changes in your health, and be sure to contact your doctor if:    · You do not get better as expected. Where can you learn more? Go to http://krystle-tamia.info/. Enter S185 in the search box to learn more about \"Dizziness: Care Instructions. \"  Current as of: September 23, 2018  Content Version: 11.9  © 7055-6198 [employer]. Care instructions adapted under license by [employer] (which disclaims liability or warranty for this information). If you have questions about a medical condition or this instruction, always ask your healthcare professional. [place] [address] any warranty or liability for your use of this information.

## 2019-05-22 NOTE — ED TRIAGE NOTES
Patient had a near syncopal episode this afternoon. Medic stated hypotensive when they arrived, patient states he feels better now.

## 2019-05-22 NOTE — ED NOTES
Patient discharged to home, denies any further questions, states he is feeling much better. Physician spoke with patient at great length and reviewed discharge instructions.

## 2019-05-28 NOTE — PROGRESS NOTES
Chief Complaint Patient presents with  Transitions Of Care 1. Have you been to the ER, urgent care clinic since your last visit? Hospitalized since your last visit? Yes When: 05/16/19 Where: [facility] ED Reason for visit:  TIA 2. Have you seen or consulted any other health care providers outside of the [address] [place] [address] since your last visit? Include any pap smears or colon screening.  No

## 2019-05-28 NOTE — PROGRESS NOTES
Caro Garcia is a 68 y.o. male and presents with Transitions Of Care Candance Huger Subjective: He presents for a follow up cerebellar infarct. He is to undergo physical therapy and OT therapy. Hypertension Review: 
The patient has hypertension . Diet and Lifestyle: generally follows a low sodium diet, exercises sporadically Home BP Monitoring: is not measured at home. Pertinent ROS: taking medications as instructed, no medication side effects noted, no TIA's, no chest pain on exertion, no dyspnea on exertion, no swelling of ankles. Dyslipidemia Review: 
Patient presents for evaluation of lipids. Compliance with treatment thus far has been excellent. A repeat fasting lipid profile was done. The patient does not use medications that may worsen dyslipidemias . The patient exercises sporadically. Review of Systems Constitutional: negative for fevers, chills, anorexia and weight loss Eyes:   negative for visual disturbance and irritation ENT:   negative for tinnitus,sore throat,nasal congestion,ear pains. hoarseness Respiratory:  negative for cough, hemoptysis, dyspnea,wheezing CV:   negative for chest pain, palpitations, lower extremity edema GI:   negative for nausea, vomiting, diarrhea, abdominal pain,melena Endo:               negative for polyuria,polydipsia,polyphagia,heat intolerance Genitourinary: negative for frequency, dysuria and hematuria Integument:  negative for rash and pruritus Hematologic:  negative for easy bruising and gum/nose bleeding Musculoskel: negative for myalgias, arthralgias, back pain, muscle weakness, joint pain Neurological:  negative for headaches, dizziness, vertigo, memory problems and gait Behavl/Psych: negative for feelings of anxiety, depression, mood changes Past Medical History:  
Diagnosis Date  Anxiety state, unspecified 8/27/2013  Arthritis  CAD (coronary artery disease)  Cataract cortical, senile 8/27/2013  Heart failure ([facility] [address] [place] 1993  Hypertension  Hypertensive retinopathy 8/27/2013  Observation for other specified suspected conditions [facility]27/2013 [facility]  Other ill-defined conditions(799.89) chronic back pain  Other ill-defined conditions(799.89) AGENT ORANGE EXPOSURE,   
 Psychiatric disorder PTSD, NO MEDS  Stroke ([facility] [address] TIA R HAND, RESOLVED  Unspecified adverse effect of anesthesia SLOW TO WAKE, AGITATED WHEN HE WAKES  
 Unspecified reason for consultation 8/27/2013 Health maintenance Past Surgical History:  
Procedure Laterality Date  ABDOMEN SURGERY PROC UNLISTED    
 x2 hernia repairs  CARDIAC SURG PROCEDURE UNLIST    
 stent, cardiologist Dr. Anastacia Douglas  HX ORTHOPAEDIC Right ROTATOR CUFF X2 Social History Socioeconomic History  Marital status: SINGLE Spouse name: Not on file  Number of children: Not on file  Years of education: Not on file  Highest education level: Not on file Tobacco Use  Smoking status: Former Smoker Packs/day: 0.50 Years: 19.00 Pack years: 9.50  Smokeless tobacco: Former User Quit date: 2/28/1984 Substance and Sexual Activity  Alcohol use: No  
 Drug use: No  
 
Family History Problem Relation Age of Onset  Cancer Mother  Heart Attack Mother  Cancer Father  Anesth Problems Neg Hx Current Outpatient Medications Medication Sig Dispense Refill  olmesartan-hydroCHLOROthiazide (BENICAR HCT) 40-12.5 mg per tablet TK 1 T PO ONCE D FOR HYPERTENSION  5  
 ondansetron (ZOFRAN ODT) 4 mg disintegrating tablet Take 1 Tab by mouth every eight (8) hours as needed for Nausea. 20 Tab 0  cephALEXin (KEFLEX) 500 mg capsule Take 1 Cap by mouth four (4) times daily for 7 days. 28 Cap 0  
 meclizine (ANTIVERT) 25 mg chewable tablet Take 1 Tab by mouth three (3) times daily as needed for Dizziness or Nausea for up to 10 days.  20 Tab 0  
  rivaroxaban (XARELTO) 20 mg tab tablet Take 1 Tab by mouth daily. 30 Tab 0  
 amLODIPine (NORVASC) 10 mg tablet Take 10 mg by mouth daily.  atorvastatin (LIPITOR) 40 mg tablet Take 40 mg by mouth nightly.  fluticasone propionate (FLONASE) 50 mcg/actuation nasal spray 1 [place] by Both Nostrils route two (2) times daily as needed for Rhinitis.  aspirin delayed-release 81 mg tablet Take 81 mg by mouth daily. 2  
 carvedilol (COREG CR) 80 mg CR capsule Take 80 mg by mouth daily (with breakfast). 5  
 potassium chloride (K-DUR, KLOR-CON) 20 mEq tablet Take 20 mEq by mouth daily. 5  
 ezetimibe (ZETIA) 10 mg tablet Take 10 mg by mouth daily.  irbesartan-hydroCHLOROthiazide (AVALIDE) 300-12.5 mg per tablet Take 1 Tab by mouth daily. Allergies Allergen Reactions  Darvocet A500 [Propoxyphene N-Acetaminophen] Other (comments)  
  floaty/high feeling Objective: 
Visit Vitals /69 (BP 1 Location: Right arm, BP Patient Position: Sitting) Pulse 77 Temp 98.1 °F (36.7 °C) (Oral) Resp 18 Ht 5' 9\" (1.753 m) Wt 170 lb (77.1 kg) SpO2 99% BMI 25.10 kg/m² Physical Exam:  
General appearance - alert, well appearing, and in no distress Mental status - alert, oriented to person, place, and time EYE-JARED, EOMI, corneas normal, no foreign bodies ENT-ENT exam normal, no neck nodes or sinus tenderness Nose - normal and patent, no erythema, discharge or polyps Mouth - mucous membranes moist, pharynx normal without lesions Neck - supple, no significant adenopathy Chest - clear to auscultation, no wheezes, rales or rhonchi, symmetric air entry Heart - normal rate, regular rhythm, normal S1, S2, no murmurs, rubs, clicks or gallops Abdomen - soft, nontender, nondistended, no masses or organomegaly Lymph- no adenopathy palpable Ext-peripheral pulses normal, no pedal edema, no clubbing or cyanosis Skin-Warm and dry. no hyperpigmentation, vitiligo, or suspicious lesions Neuro -alert, oriented, normal speech, no focal findings or movement disorder noted Neck-normal C-spine, no tenderness, full ROM without pain Feet-no nail deformities or callus formation with good pulses noted Results for orders placed or performed during the hospital encounter of 05/22/19 CULTURE, URINE Result Value Ref Range Special Requests: NO SPECIAL REQUESTS Reflexed from [id number] [place] Count [id number] COLONIES/mL Culture result: ALPHA STREPTOCOCCUS (A) CBC WITH AUTOMATED DIFF Result Value Ref Range WBC 6.1 4.1 - 11.1 K/uL  
 RBC 4.90 4. 10 - 5.70 M/uL  
 HGB 12.9 12.1 - 17.0 g/dL HCT 41.4 36.6 - 50.3 % MCV 84.5 80.0 - 99.0 FL  
 MCH 26.3 26.0 - 34.0 PG  
 MCHC 31.2 30.0 - 36.5 g/dL  
 RDW 12.5 11.5 - 14.5 % PLATELET 299 376 - 775 K/uL MPV 9.6 8.9 - 12.9 FL  
 NRBC 0.0 0  WBC ABSOLUTE NRBC 0.00 0.00 - 0.01 K/uL NEUTROPHILS 73 32 - 75 % LYMPHOCYTES 16 12 - 49 % MONOCYTES 9 5 - 13 % EOSINOPHILS 1 0 - 7 % BASOPHILS 1 0 - 1 % IMMATURE GRANULOCYTES 0 0.0 - 0.5 % ABS. NEUTROPHILS 4.4 1.8 - 8.0 K/UL  
 ABS. LYMPHOCYTES 1.0 0.8 - 3.5 K/UL  
 ABS. MONOCYTES 0.6 0.0 - 1.0 K/UL  
 ABS. EOSINOPHILS 0.1 0.0 - 0.4 K/UL  
 ABS. BASOPHILS 0.0 0.0 - 0.1 K/UL  
 ABS. IMM. GRANS. 0.0 0.00 - 0.04 K/UL  
 DF AUTOMATED METABOLIC PANEL, COMPREHENSIVE Result Value Ref Range Sodium 141 136 - 145 mmol/L Potassium 3.7 3.5 - 5.1 mmol/L Chloride 106 97 - 108 mmol/L  
 CO2 29 21 - 32 mmol/L Anion gap 6 5 - 15 mmol/L Glucose 106 (H) 65 - 100 mg/dL BUN 22 (H) 6 - 20 MG/DL Creatinine 1.31 (H) 0.70 - 1.30 MG/DL  
 BUN/Creatinine ratio 17 12 - 20 GFR est AA >60 >60 ml/min/1.73m2 GFR est non-AA 54 (L) >60 ml/min/1.73m2 Calcium 7.8 (L) 8.5 - 10.1 MG/DL  Bilirubin, total 0.8 0.2 - 1.0 MG/DL  
 ALT (SGPT) 97 (H) 12 - 78 U/L  
 AST (SGOT) 124 (H) 15 - 37 U/L  
 Alk. phosphatase 81 45 - 117 U/L Protein, total 6.8 6.4 - 8.2 g/dL Albumin 3.2 (L) 3.5 - 5.0 g/dL Globulin 3.6 2.0 - 4.0 g/dL A-G Ratio 0.9 (L) 1.1 - 2.2 CK W/ REFLX CKMB Result Value Ref Range  39 - 308 U/L  
TROPONIN I Result Value Ref Range Troponin-I, Qt. <0.05 <0.05 ng/mL NT-PRO BNP Result Value Ref Range NT pro-BNP 95 <125 PG/ML  
MAGNESIUM Result Value Ref Range Magnesium 2.1 1.6 - 2.4 mg/dL TSH 3RD GENERATION Result Value Ref Range TSH 1.08 0.36 - 3.74 uIU/mL T4, FREE Result Value Ref Range T4, Free 1.2 0.8 - 1.5 NG/DL URINALYSIS W/ REFLEX CULTURE Result Value Ref Range Color YELLOW/STRAW Appearance CLEAR CLEAR Specific gravity 1.017 1.003 - 1.030    
 pH (UA) 6.0 5.0 - 8.0 Protein NEGATIVE  NEG mg/dL Glucose NEGATIVE  NEG mg/dL Ketone NEGATIVE  NEG mg/dL Bilirubin NEGATIVE  NEG Blood NEGATIVE  NEG Urobilinogen 1.0 0.2 - 1.0 EU/dL Nitrites NEGATIVE  NEG Leukocyte Esterase SMALL (A) NEG    
 WBC 5-10 0 - 4 /hpf  
 RBC 0-5 0 - 5 /hpf Epithelial cells FEW FEW /lpf Bacteria 1+ (A) NEG /hpf  
 UA:UC IF INDICATED URINE CULTURE ORDERED (A) CNI Mucus 1+ (A) NEG /lpf Hyaline cast 2-5 0 - 5 /lpf EKG, 12 LEAD, INITIAL Result Value Ref Range Ventricular Rate 76 BPM  
 Atrial Rate 76 BPM  
 P-R Interval 146 ms  
 QRS Duration 76 ms  
 Q-T Interval 404 ms QTC Calculation (Bezet) 454 ms Calculated P Axis 43 degrees Calculated R Axis -18 degrees Calculated T Axis 69 degrees Diagnosis Sinus rhythm with frequent premature ventricular complexes Minimal voltage criteria for LVH, may be normal variant Anteroseptal infarct (cited on or before 18-FEB-2009) When compared with ECG of 16-MAY-2019 10:06, No significant change was found Confirmed by Xuan Garg, P.V. ([id number]) on 5/23/2019 1:10:33 PM 
  
 
 
Assessment/Plan: ICD-10-CM ICD-9-CM 1. New infarction of cerebellum (HCC) I63.9 434.91   
2. Coronary artery disease involving native coronary artery of native heart without angina pectoris I25.10 414.01   
3. HTN (hypertension), benign I10 401.1 4. Primary osteoarthritis of right knee M17.11 715.16   
5. Hypercholesteremia E78.00 272.0 Orders Placed This Encounter  olmesartan-hydroCHLOROthiazide (BENICAR HCT) 40-12.5 mg per tablet Sig: TK 1 T PO ONCE D FOR HYPERTENSION Refill:  5  
 
increase physical activity, follow low fat diet, follow low salt diet, continue present plan,Take 81mg aspirin daily Patient Instructions MyChart Activation Thank you for requesting access to [employer]. Please follow the instructions below to securely access and download your online medical record. [employer] allows you to send messages to your doctor, view your test results, renew your prescriptions, schedule appointments, and more. How Do I Sign Up? 1. In your internet browser, go to [email or web address] 
2. Click on the First Time User? Click Here link in the Sign In box. You will be redirect to the New Member Sign Up page. 3. Enter your [employer] Access Code exactly as it appears below. You will not need to use this code after youve completed the sign-up process. If you do not sign up before the expiration date, you must request a new code. [employer] Access Code: [email or web address] Expires: 2019 10:04 AM (This is the date your [employer] access code will ) 4. Enter the last four digits of your Social Security Number (xxxx) and Date of Birth (mm/dd/yyyy) as indicated and click Submit. You will be taken to the next sign-up page. 5. Create a [employer] ID. This will be your [employer] login ID and cannot be changed, so think of one that is secure and easy to remember. 6. Create a [employer] password. You can change your password at any time. 7. Enter your Password Reset Question and Answer.  This can be used at a later time if you forget your password. 8. Enter your e-mail address. You will receive e-mail notification when new information is available in [address] 9. Click Sign Up. You can now view and download portions of your medical record. 10. Click the Download Summary menu link to download a portable copy of your medical information. Additional Information If you have questions, please visit the Frequently Asked Questions section of the [email or web address] website at [email or web address] Remember, [email or web address] is NOT to be used for urgent needs. For medical emergencies, dial 911. Follow-up and Dispositions · Return in about 1 month (around 6/25/2019), or if symptoms worsen or fail to improve. I have reviewed with the patient details of the assessment and plan and all questions were answered. Relevent patient education was performed. The most recent lab findings were reviewed with the patient. An After Visit Summary was printed and given to the patient.

## 2019-05-28 NOTE — PATIENT INSTRUCTIONS
[email or web address] Activation Thank you for requesting access to [email or web address]. Please follow the instructions below to securely access and download your online medical record. [email or web address] allows you to send messages to your doctor, view your test results, renew your prescriptions, schedule appointments, and more. How Do I Sign Up? 1. In your internet browser, go to [email or web address] 
2. Click on the First Time User? Click Here link in the Sign In box. You will be redirect to the New Member Sign Up page. 3. Enter your SmartStudy.[email or web address] Access Code exactly as it appears below. You will not need to use this code after youve completed the sign-up process. If you do not sign up before the expiration date, you must request a new code. SmartStudy.[email or web address] Access Code: [email or web address] Expires: 2019 10:04 AM (This is the date your [email or web address] access code will ) 4. Enter the last four digits of your Social Security Number (xxxx) and Date of Birth (mm/dd/yyyy) as indicated and click Submit. You will be taken to the next sign-up page. 5. Create a [email or web address] ID. This will be your [email or web address] login ID and cannot be changed, so think of one that is secure and easy to remember. 6. Create a [email or web address] password. You can change your password at any time. 7. Enter your Password Reset Question and Answer. This can be used at a later time if you forget your password. 8. Enter your e-mail address. You will receive e-mail notification when new information is available in [address] 9. Click Sign Up. You can now view and download portions of your medical record. 10. Click the Download Summary menu link to download a portable copy of your medical information. Additional Information If you have questions, please visit the Frequently Asked Questions section of the [email or web address] website at [email or web address] Remember, [email or web address] is NOT to be used for urgent needs. For medical emergencies, dial 911.

## 2019-06-26 NOTE — PROGRESS NOTES
Chief Complaint Patient presents with  Cerebrovascular Accident 3 most recent PHQ Screens 6/26/2019 PHQ Not Done - Little interest or pleasure in doing things Not at all Feeling down, depressed, irritable, or hopeless Not at all Total Score PHQ 2 0  
 
1. Have you been to the ER, urgent care clinic since your last visit? Hospitalized since your last visit?no 2. Have you seen or consulted any other health care providers outside of the [facility] since your last visit? Include any pap smears or colon screening.  No

## 2019-06-26 NOTE — PROGRESS NOTES
Melvin Guevara is a 68 y.o. male and presents with Cerebrovascular Accident Jemalrobyn Milton Subjective: He presents for a follow up cerebellar infarct. He has completed physical therapy and OT therapy. Hypertension Review: 
The patient has hypertension . Diet and Lifestyle: generally follows a low sodium diet, exercises sporadically Home BP Monitoring: is not measured at home. Pertinent ROS: taking medications as instructed, no medication side effects noted, no TIA's, no chest pain on exertion, no dyspnea on exertion, no swelling of ankles. Dyslipidemia Review: 
Patient presents for evaluation of lipids. Compliance with treatment thus far has been excellent. A repeat fasting lipid profile was done. The patient does not use medications that may worsen dyslipidemias . The patient exercises sporadically. Health maintenance suggests the needs for a DEXA BONE DENSITY SCAN Needs AAA screen Review of Systems Constitutional: negative for fevers, chills, anorexia and weight loss Eyes:   negative for visual disturbance and irritation ENT:   negative for tinnitus,sore throat,nasal congestion,ear pains. hoarseness Respiratory:  negative for cough, hemoptysis, dyspnea,wheezing CV:   negative for chest pain, palpitations, lower extremity edema GI:   negative for nausea, vomiting, diarrhea, abdominal pain,melena Endo:               negative for polyuria,polydipsia,polyphagia,heat intolerance Genitourinary: negative for frequency, dysuria and hematuria Integument:  negative for rash and pruritus Hematologic:  negative for easy bruising and gum/nose bleeding Musculoskel: negative for myalgias, arthralgias, back pain, muscle weakness, joint pain Neurological:  negative for headaches, dizziness, vertigo, memory problems and gait Behavl/Psych: negative for feelings of anxiety, depression, mood changes Past Medical History:  
Diagnosis Date  Anxiety state, unspecified 8/27/2013  Arthritis  CAD (coronary artery disease)  Cataract cortical, senile 8/27/2013  Heart failure ([facility] [address] [place] 1993  Hypertension  Hypertensive retinopathy 8/27/2013  [facility] for other specified suspected conditions [facility]  Other ill-defined conditions(799.89) chronic back pain  Other ill-defined conditions(799.89) AGENT ORANGE EXPOSURE,   
 Psychiatric disorder PTSD, NO MEDS  Stroke ([facility] [address] TIA R HAND, RESOLVED  Unspecified adverse effect of anesthesia SLOW TO WAKE, AGITATED WHEN HE WAKES  
 Unspecified reason for consultation 8/27/2013 Health maintenance Past Surgical History:  
Procedure Laterality Date  ABDOMEN SURGERY PROC UNLISTED    
 x2 hernia repairs  CARDIAC SURG PROCEDURE UNLIST    
 stent, cardiologist Dr. Toño Smith  HX ORTHOPAEDIC Right ROTATOR CUFF X2 Social History Socioeconomic History  Marital status: SINGLE Spouse name: Not on file  Number of children: Not on file  Years of education: Not on file  Highest education level: Not on file Tobacco Use  Smoking status: Former Smoker Packs/day: 0.50 Years: 19.00 Pack years: 9.50  Smokeless tobacco: Former User Quit date: 2/28/1984 Substance and Sexual Activity  Alcohol use: No  
 Drug use: No  
 
Family History Problem Relation Age of Onset  Cancer Mother  Heart Attack Mother  Cancer Father  Anesth Problems Neg Hx Current Outpatient Medications Medication Sig Dispense Refill  olmesartan-hydroCHLOROthiazide (BENICAR HCT) 40-12.5 mg per tablet TK 1 T PO ONCE D FOR HYPERTENSION  5  
 rivaroxaban (XARELTO) 20 mg tab tablet Take 1 Tab by mouth daily. 30 Tab 0  
 amLODIPine (NORVASC) 10 mg tablet Take 10 mg by mouth daily.  atorvastatin (LIPITOR) 40 mg tablet Take 40 mg by mouth nightly.     
 fluticasone propionate (FLONASE) 50 mcg/actuation nasal spray 1 Spray by Both Nostrils route two (2) times daily as needed for Rhinitis.  aspirin delayed-release 81 mg tablet Take 81 mg by mouth daily. 2  
 carvedilol (COREG CR) 80 mg CR capsule Take 80 mg by mouth daily (with breakfast). 5  
 potassium chloride (K-DUR, KLOR-CON) 20 mEq tablet Take 20 mEq by mouth daily. 5  
 ezetimibe (ZETIA) 10 mg tablet Take 10 mg by mouth daily.  ondansetron (ZOFRAN ODT) 4 mg disintegrating tablet Take 1 Tab by mouth every eight (8) hours as needed for Nausea. 20 Tab 0  
 irbesartan-hydroCHLOROthiazide (AVALIDE) 300-12.5 mg per tablet Take 1 Tab by mouth daily. Allergies Allergen Reactions  Darvocet A500 [Propoxyphene N-Acetaminophen] Other (comments)  
  floaty/high feeling Objective: 
Visit Vitals /70 (BP 1 Location: Right arm, BP Patient Position: Sitting) Pulse 60 Temp 97.9 °F (36.6 °C) (Oral) Resp 17 Ht 5' 9\" (1.753 m) Wt 175 lb (79.4 kg) SpO2 96% BMI 25.84 kg/m² Physical Exam:  
General appearance - alert, well appearing, and in no distress Mental status - alert, oriented to person, place, and time EYE-JARED, EOMI, corneas normal, no foreign bodies ENT-ENT exam normal, no neck nodes or sinus tenderness Nose - normal and patent, no erythema, discharge or polyps Mouth - mucous membranes moist, pharynx normal without lesions Neck - supple, no significant adenopathy Chest - clear to auscultation, no wheezes, rales or rhonchi, symmetric air entry Heart - normal rate, regular rhythm, normal S1, S2, no murmurs, rubs, clicks or gallops Abdomen - soft, nontender, nondistended, no masses or organomegaly Lymph- no adenopathy palpable Ext-peripheral pulses normal, no pedal edema, no clubbing or cyanosis Skin-Warm and dry. no hyperpigmentation, vitiligo, or suspicious lesions Neuro -alert, oriented, normal speech, no focal findings or movement disorder noted Neck-normal C-spine, no tenderness, full ROM without pain Feet-no nail deformities or callus formation with good pulses noted Results for orders placed or performed during the hospital encounter of 05/22/19 CULTURE, URINE Result Value Ref Range Special Requests: NO SPECIAL REQUESTS Reflexed from [id number] Wilson Count [id number] COLONIES/mL Culture result: ALPHA STREPTOCOCCUS (A) CBC WITH AUTOMATED DIFF Result Value Ref Range WBC 6.1 4.1 - 11.1 K/uL  
 RBC 4.90 4. 10 - 5.70 M/uL  
 HGB 12.9 12.1 - 17.0 g/dL HCT 41.4 36.6 - 50.3 % MCV 84.5 80.0 - 99.0 FL  
 MCH 26.3 26.0 - 34.0 PG  
 MCHC 31.2 30.0 - 36.5 g/dL  
 RDW 12.5 11.5 - 14.5 % PLATELET 977 403 - 921 K/uL MPV 9.6 8.9 - 12.9 FL  
 NRBC 0.0 0  WBC ABSOLUTE NRBC 0.00 0.00 - 0.01 K/uL NEUTROPHILS 73 32 - 75 % LYMPHOCYTES 16 12 - 49 % MONOCYTES 9 5 - 13 % EOSINOPHILS 1 0 - 7 % BASOPHILS 1 0 - 1 % IMMATURE GRANULOCYTES 0 0.0 - 0.5 % ABS. NEUTROPHILS 4.4 1.8 - 8.0 K/UL  
 ABS. LYMPHOCYTES 1.0 0.8 - 3.5 K/UL  
 ABS. MONOCYTES 0.6 0.0 - 1.0 K/UL  
 ABS. EOSINOPHILS 0.1 0.0 - 0.4 K/UL  
 ABS. BASOPHILS 0.0 0.0 - 0.1 K/UL  
 ABS. IMM. GRANS. 0.0 0.00 - 0.04 K/UL  
 DF AUTOMATED METABOLIC PANEL, COMPREHENSIVE Result Value Ref Range Sodium 141 136 - 145 mmol/L Potassium 3.7 3.5 - 5.1 mmol/L Chloride 106 97 - 108 mmol/L  
 CO2 29 21 - 32 mmol/L Anion gap 6 5 - 15 mmol/L Glucose 106 (H) 65 - 100 mg/dL BUN 22 (H) 6 - 20 MG/DL Creatinine 1.31 (H) 0.70 - 1.30 MG/DL  
 BUN/Creatinine ratio 17 12 - 20 GFR est AA >60 >60 ml/min/1.73m2 GFR est non-AA 54 (L) >60 ml/min/1.73m2 Calcium 7.8 (L) 8.5 - 10.1 MG/DL Bilirubin, total 0.8 0.2 - 1.0 MG/DL  
 ALT (SGPT) 97 (H) 12 - 78 U/L  
 AST (SGOT) 124 (H) 15 - 37 U/L Alk. phosphatase 81 45 - 117 U/L Protein, total 6.8 6.4 - 8.2 g/dL Albumin 3.2 (L) 3.5 - 5.0 g/dL Globulin 3.6 2.0 - 4.0 g/dL A-G Ratio 0.9 (L) 1.1 - 2.2 CK W/ REFLX CKMB Result Value Ref Range  39 - 308 U/L  
TROPONIN I Result Value Ref Range Troponin-I, Qt. <0.05 <0.05 ng/mL NT-PRO BNP Result Value Ref Range NT pro-BNP 95 <125 PG/ML  
MAGNESIUM Result Value Ref Range Magnesium 2.1 1.6 - 2.4 mg/dL TSH 3RD GENERATION Result Value Ref Range TSH 1.08 0.36 - 3.74 uIU/mL T4, FREE Result Value Ref Range T4, Free 1.2 0.8 - 1.5 NG/DL URINALYSIS W/ REFLEX CULTURE Result Value Ref Range Color YELLOW/STRAW Appearance CLEAR CLEAR Specific gravity 1.017 1.003 - 1.030    
 pH (UA) 6.0 5.0 - 8.0 Protein NEGATIVE  NEG mg/dL Glucose NEGATIVE  NEG mg/dL Ketone NEGATIVE  NEG mg/dL Bilirubin NEGATIVE  NEG Blood NEGATIVE  NEG Urobilinogen 1.0 0.2 - 1.0 EU/dL Nitrites NEGATIVE  NEG Leukocyte Esterase SMALL (A) NEG    
 WBC 5-10 0 - 4 /hpf  
 RBC 0-5 0 - 5 /hpf Epithelial cells FEW FEW /lpf Bacteria 1+ (A) NEG /hpf  
 UA:UC IF INDICATED URINE CULTURE ORDERED (A) CNI Mucus 1+ (A) NEG /lpf Hyaline cast 2-5 0 - 5 /lpf EKG, 12 LEAD, INITIAL Result Value Ref Range Ventricular Rate 76 BPM  
 Atrial Rate 76 BPM  
 P-R Interval 146 ms  
 QRS Duration 76 ms  
 Q-T Interval 404 ms QTC Calculation (Bezet) 454 ms Calculated P Axis 43 degrees Calculated R Axis -18 degrees Calculated T Axis 69 degrees Diagnosis Sinus rhythm with frequent premature ventricular complexes Minimal voltage criteria for LVH, may be normal variant Anteroseptal infarct (cited on or before 18-FEB-2009) When compared with ECG of 16-MAY-2019 10:06, No significant change was found Confirmed by Sukumar Neil, P.V. ([id number]) on 5/23/2019 1:10:33 PM 
  
 
 
Assessment/Plan: ICD-10-CM ICD-9-CM 1. Coronary artery disease involving native coronary artery of native heart without angina pectoris I25.10 414.01   
2. HTN (hypertension), benign I10 401.1 3. Primary osteoarthritis of right knee M17.11 715.16   
4. Hypercholesteremia E78.00 272.0 5. New infarction of cerebellum (HCC) I63.9 434.91   
6. Hypercholesterolemia E78.00 272.0 No orders of the defined types were placed in this encounter. increase physical activity, follow low fat diet, follow low salt diet, continue present plan,Take 81mg aspirin daily There are no Patient Instructions on file for this visit. I have reviewed with the patient details of the assessment and plan and all questions were answered. Relevent patient education was performed. The most recent lab findings were reviewed with the patient. An After Visit Summary was printed and given to the patient.

## 2019-07-03 NOTE — PATIENT INSTRUCTIONS

## 2019-07-03 NOTE — PROGRESS NOTES
NEUROLOGY NOTE     Chief Complaint   Patient presents with   [facility] Follow Up     cva       SUBJECTIVE:    Ade Johnson is a 68 y.o. male who presents to the hospital because of vertigo and imbalance. Patient states that symptoms started on 5/15/2019 at around 8 PM.  He was going upstairs when he started feeling imbalance. He was also vomiting as well. Symptoms did not improve and hence he came to the hospital.  He did not have any numbness or weakness of the upper or lower extremities. He denies any dysarthria. When he is laying down he is not having any symptoms and it comes on only when he sits up and walks. Initial CT scan of the head was normal.  MRI of the brain did show ventral vermis infarct. The patient is now on Xarelto and aspirin 81 mg a day. Work-up did not show show a cardiac thrombus. He is following up with cardiology.     ROS  A ten system review of constitutional, cardiovascular, respiratory, musculoskeletal, endocrine, skin, SHEENT, genitourinary, psychiatric and neurologic systems was obtained and is unremarkable except as stated in HPI     PMH  Past Medical History:   Diagnosis Date    Anxiety state, unspecified 8/27/2013    Arthritis     CAD (coronary artery disease)     Cataract cortical, senile 8/27/2013    Heart failure ([address]     MI 1993    Hypertension     Hypertensive retinopathy 8/27/2013    Observation for other specified suspected conditions 8/27/2013    [facility]    Other ill-defined conditions(799.89)     chronic back pain    Other ill-defined conditions(799.89)     AGENT ORANGE EXPOSURE,     Psychiatric disorder     PTSD, NO MEDS    Stroke ([address]     TIA R HAND, RESOLVED    Unspecified adverse effect of anesthesia     SLOW TO WAKE, AGITATED WHEN HE WAKES    Unspecified reason for consultation 8/27/2013    Health maintenance       FH  Family History   Problem Relation Age of Onset    Cancer Mother     Heart Attack Mother    [facility] Cancer Father     Anesth Problems Neg Hx        SH  Social History     Socioeconomic History    Marital status: SINGLE     Spouse name: Not on file    Number of children: Not on file    Years of education: Not on file    Highest education level: Not on file   Tobacco Use    Smoking status: Former Smoker     Packs/day: 0.50     Years: 19.00     Pack years: 9.50    Smokeless tobacco: Former User     Quit date: 2/28/1984   Substance and Sexual Activity    Alcohol use: No    Drug use: No       ALLERGIES  Allergies   Allergen Reactions    Darvocet A500 [Propoxyphene N-Acetaminophen] Other (comments)     floaty/high feeling       PHYSICAL EXAMINATION:   Visit Vitals  /62   Pulse (!) 56   Ht 5' 9\" (1.753 m)   Wt 174 lb (78.9 kg)   SpO2 98%   BMI 25.70 kg/m²      General:   General appearance: Pt is in no acute distress   Distal pulses are preserved    Neurological Examination:   Mental Status:  AAO x3. Speech is fluent. Follows commands, has normal fund of knowledge, attention, short term recall, comprehension and insight. Cranial Nerves: Visual fields are full. PERRL, Extraocular movements are full. Facial sensation intact. Facial movement intact. Hearing intact to conversation. Palate elevates symmetrically. Shoulder shrug symmetric. Tongue midline. Motor: Strength is 5/5 in all 4 ext. Normal tone. No atrophy. Sensation: Normal to light touch    Reflexes: DTRs 2+ throughout. Plantar responses downgoing. Coordination/Cerebellar: Intact to finger-nose-finger     Gait: Mildly ataxic on walking    Skin: No significant bruising or lacerations.     LAB DATA REVIEWED:    Results for orders placed or performed during the hospital encounter of 05/22/19   CULTURE, URINE   Result Value Ref Range    Special Requests: NO SPECIAL REQUESTS  Reflexed from [id number]        Bronson Count [id number]  COLONIES/mL        Culture result: ALPHA STREPTOCOCCUS (A)     CBC WITH AUTOMATED DIFF   Result Value Ref Range WBC 6.1 4.1 - 11.1 K/uL    RBC 4.90 4. 10 - 5.70 M/uL    HGB 12.9 12.1 - 17.0 g/dL    HCT 41.4 36.6 - 50.3 %    MCV 84.5 80.0 - 99.0 FL    MCH 26.3 26.0 - 34.0 PG    MCHC 31.2 30.0 - 36.5 g/dL    RDW 12.5 11.5 - 14.5 %    PLATELET 139 267 - 020 K/uL    MPV 9.6 8.9 - 12.9 FL    NRBC 0.0 0  WBC    ABSOLUTE NRBC 0.00 0.00 - 0.01 K/uL    NEUTROPHILS 73 32 - 75 %    LYMPHOCYTES 16 12 - 49 %    MONOCYTES 9 5 - 13 %    EOSINOPHILS 1 0 - 7 %    BASOPHILS 1 0 - 1 %    IMMATURE GRANULOCYTES 0 0.0 - 0.5 %    ABS. NEUTROPHILS 4.4 1.8 - 8.0 K/UL    ABS. LYMPHOCYTES 1.0 0.8 - 3.5 K/UL    ABS. MONOCYTES 0.6 0.0 - 1.0 K/UL    ABS. EOSINOPHILS 0.1 0.0 - 0.4 K/UL    ABS. BASOPHILS 0.0 0.0 - 0.1 K/UL    ABS. IMM. GRANS. 0.0 0.00 - 0.04 K/UL    DF AUTOMATED     METABOLIC PANEL, COMPREHENSIVE   Result Value Ref Range    Sodium 141 136 - 145 mmol/L    Potassium 3.7 3.5 - 5.1 mmol/L    Chloride 106 97 - 108 mmol/L    CO2 29 21 - 32 mmol/L    Anion gap 6 5 - 15 mmol/L    Glucose 106 (H) 65 - 100 mg/dL    BUN 22 (H) 6 - 20 MG/DL    Creatinine 1.31 (H) 0.70 - 1.30 MG/DL    BUN/Creatinine ratio 17 12 - 20      GFR est AA >60 >60 ml/min/1.73m2    GFR est non-AA 54 (L) >60 ml/min/1.73m2    Calcium 7.8 (L) 8.5 - 10.1 MG/DL    Bilirubin, total 0.8 0.2 - 1.0 MG/DL    ALT (SGPT) 97 (H) 12 - 78 U/L    AST (SGOT) 124 (H) 15 - 37 U/L    Alk.  phosphatase 81 45 - 117 U/L    Protein, total 6.8 6.4 - 8.2 g/dL    Albumin 3.2 (L) 3.5 - 5.0 g/dL    Globulin 3.6 2.0 - 4.0 g/dL    A-G Ratio 0.9 (L) 1.1 - 2.2     CK W/ REFLX CKMB   Result Value Ref Range     39 - 308 U/L   TROPONIN I   Result Value Ref Range    Troponin-I, Qt. <0.05 <0.05 ng/mL   NT-PRO BNP   Result Value Ref Range    NT pro-BNP 95 <125 PG/ML   MAGNESIUM   Result Value Ref Range    Magnesium 2.1 1.6 - 2.4 mg/dL   TSH 3RD GENERATION   Result Value Ref Range    TSH 1.08 0.36 - 3.74 uIU/mL   T4, FREE   Result Value Ref Range    T4, Free 1.2 0.8 - 1.5 NG/DL   URINALYSIS W/ REFLEX CULTURE   Result Value Ref Range    Color YELLOW/STRAW      Appearance CLEAR CLEAR      Specific gravity 1.017 1.003 - 1.030      pH (UA) 6.0 5.0 - 8.0      Protein NEGATIVE  NEG mg/dL    Glucose NEGATIVE  NEG mg/dL    Ketone NEGATIVE  NEG mg/dL    Bilirubin NEGATIVE  NEG      Blood NEGATIVE  NEG      Urobilinogen 1.0 0.2 - 1.0 EU/dL    Nitrites NEGATIVE  NEG      Leukocyte Esterase SMALL (A) NEG      WBC 5-10 0 - 4 /hpf    RBC 0-5 0 - 5 /hpf    Epithelial cells FEW FEW /lpf    Bacteria 1+ (A) NEG /hpf    UA:UC IF INDICATED URINE CULTURE ORDERED (A) CNI      Mucus 1+ (A) NEG /lpf    Hyaline cast 2-5 0 - 5 /lpf   EKG, 12 LEAD, INITIAL   Result Value Ref Range    Ventricular Rate 76 BPM    Atrial Rate 76 BPM    P-R Interval 146 ms    QRS Duration 76 ms    Q-T Interval 404 ms    QTC Calculation (Bezet) 454 ms    Calculated P Axis 43 degrees    Calculated R Axis -18 degrees    Calculated T Axis 69 degrees    Diagnosis       Sinus rhythm with frequent premature ventricular complexes  Minimal voltage criteria for LVH, may be normal variant  Anteroseptal infarct (cited on or before 18-FEB-2009)  When compared with ECG of 16-MAY-2019 10:06,  No significant change was found  Confirmed by Connie Hanks P.V. ([id number]) on 5/23/2019 1:10:33 PM        Current Outpatient Medications   Medication Sig Dispense Refill    ranolazine ER (RANEXA) 500 mg SR tablet Take  by mouth two (2) times a day.  olmesartan-hydroCHLOROthiazide (BENICAR HCT) 40-12.5 mg per tablet TK 1 T PO ONCE D FOR HYPERTENSION  5    ondansetron (ZOFRAN ODT) 4 mg disintegrating tablet Take 1 Tab by mouth every eight (8) hours as needed for Nausea. 20 Tab 0    rivaroxaban (XARELTO) 20 mg tab tablet Take 1 Tab by mouth daily. 30 Tab 0    amLODIPine (NORVASC) 10 mg tablet Take 10 mg by mouth daily.  atorvastatin (LIPITOR) 40 mg tablet Take 40 mg by mouth nightly.       fluticasone propionate (FLONASE) 50 mcg/actuation nasal spray 1 [place] by Both Nostrils route two (2) times daily as needed for Rhinitis.  aspirin delayed-release 81 mg tablet Take 81 mg by mouth daily. 2    carvedilol (COREG CR) 80 mg CR capsule Take 80 mg by mouth daily (with breakfast). 5    potassium chloride (K-DUR, KLOR-CON) 20 mEq tablet Take 20 mEq by mouth daily. 5    ezetimibe (ZETIA) 10 mg tablet Take 10 mg by mouth daily. Stroke workup    MRI Brain  Small acute infarction ventral vermis. Chronic right matter disease and remote  right parietal infarction as above. CT Head/CTA head and neck  No evidence for acute large vessel arterial occlusion. Moderate proximal short  segment narrowing of the left vertebral artery which is patent distally. Moderate to severe cervical spondylosis      Numerous bilateral thyroid nodules     CT perfusion brain: No significant perfusion abnormality    TTE:   · Estimated left ventricular ejection fraction is 36 - 40%. Abnormal left ventricular wall motion. echogenic possible thrombus present in the left ventricle. Thrombus is fixed and located in the apex. Stroke labs:  HgBA1c    Lab Results   Component Value Date/Time    Hemoglobin A1c 5.5 05/17/2019 03:56 AM     LDL   Lab Results   Component Value Date/Time    LDL, calculated 55.6 05/17/2019 03:56 AM       IMPRESSION:  Sydni Ledbetter is a 68 y.o. male who presents with new onset vertigo that started on 5/15/2019. Symptoms are improving. Stroke work-up did show a ventral vermis infarct. He did also have a cardiac thrombus as well. Patient is taking aspirin 81 mg a day and Xarelto. We will continue the same. We will continue the Lipitor and blood pressure goal is less than 140/90. Blood pressure is well controlled at this time.       Follow-up as needed    Ursula Moreno MD  Neurologist

## 2019-07-03 NOTE — LETTER
7/3/19 Patient: Shana Solano YOB: 1945 Date of Visit: 7/3/2019 Sandra Lin MD 
[address] [address] VIA In Basket Dear Sandra Lin MD, Thank you for referring Mr. Mey Lyon to [address] [place] [address] for evaluation. My notes for this consultation are attached. If you have questions, please do not hesitate to call me. I look forward to following your patient along with you. Sincerely, Eva Webster MD

## 2019-08-11 PROBLEM — D62 ACUTE BLOOD LOSS ANEMIA: Status: ACTIVE | Noted: 2019-01-01

## 2019-08-11 PROBLEM — K92.2 GI BLEED: Status: ACTIVE | Noted: 2019-01-01

## 2019-08-11 NOTE — ED NOTES
Assumed care from triage. Patient comes to the ED complaining of low systolic BP in the 67'P. Patient states that he has to call his BP into the [place]. Patient states that he had a stroke in May and a previous heart attack. Patient states he just hasnt felt well since May.

## 2019-08-11 NOTE — ED PROVIDER NOTES
EMERGENCY DEPARTMENT HISTORY AND PHYSICAL EXAM 
 
 
Date: 8/11/2019 Patient Name: Violeta Solorzano History of Presenting Illness Chief Complaint Patient presents with  Dizziness  
  lightheaded when he went to [employer] this morning; he has to call his bp readings into the [place] every day. bp he got was in the 90s History Provided By: Patient HPI: Violeta Solorzano, 68 y.o. male with PMHx significant for hypertension, hyperlipidemia, CAD/MI, CVA, PE, presents to the ED with cc of mild episodes of hypotension and lightheadedness while in [employer] this afternoon. Patient reports feeling lightheaded but not having any syncopal episode, chest pain, shortness of breath, palpitations, fevers, chills, or any other associated symptoms. Patient reports he checks his blood pressure multiple times per day because of his cardiac history. He is on several medications for his blood pressure and he has not missed any recent doses. He denies any recent illness or any medication dosing changes. No others exacerbating or militating factors. There are no other complaints, changes, or physical findings at this time. PCP: Diamond Lance., MD 
 
No current facility-administered medications on file prior to encounter. Current Outpatient Medications on File Prior to Encounter Medication Sig Dispense Refill  ranolazine ER (RANEXA) 500 mg SR tablet Take  by mouth two (2) times a day.  olmesartan-hydroCHLOROthiazide (BENICAR HCT) 40-12.5 mg per tablet TK 1 T PO ONCE D FOR HYPERTENSION  5  
 ondansetron (ZOFRAN ODT) 4 mg disintegrating tablet Take 1 Tab by mouth every eight (8) hours as needed for Nausea. 20 Tab 0  
 rivaroxaban (XARELTO) 20 mg tab tablet Take 1 Tab by mouth daily. 30 Tab 0  
 amLODIPine (NORVASC) 10 mg tablet Take 10 mg by mouth daily.  atorvastatin (LIPITOR) 40 mg tablet Take 40 mg by mouth nightly.  fluticasone propionate (FLONASE) 50 mcg/actuation nasal spray 1 [place] by Both Nostrils route two (2) times daily as needed for Rhinitis.  aspirin delayed-release 81 mg tablet Take 81 mg by mouth daily. 2  
 carvedilol (COREG CR) 80 mg CR capsule Take 80 mg by mouth daily (with breakfast). 5  
 potassium chloride (K-DUR, KLOR-CON) 20 mEq tablet Take 20 mEq by mouth daily. 5  
 ezetimibe (ZETIA) 10 mg tablet Take 10 mg by mouth daily. Past History Past Medical History: 
Past Medical History:  
Diagnosis Date  Anxiety state, unspecified 8/27/2013  Arthritis  CAD (coronary artery disease)  Cataract cortical, senile 8/27/2013  Heart failure ([facility] [address] [place] 1993  Hypertension  Hypertensive retinopathy 8/27/2013  Observation for other specified suspected conditions [facility]/27/2013 [facility]  Other ill-defined conditions(799.89) chronic back pain  Other ill-defined conditions(799.89) AGENT ORANGE EXPOSURE,   
 Psychiatric disorder PTSD, NO MEDS  Stroke ([facility] [address] TIA R HAND, RESOLVED  Unspecified adverse effect of anesthesia SLOW TO WAKE, AGITATED WHEN HE WAKES  
 Unspecified reason for consultation 8/27/2013 Health maintenance Past Surgical History: 
Past Surgical History:  
Procedure Laterality Date  ABDOMEN SURGERY PROC UNLISTED    
 x2 hernia repairs  CARDIAC SURG PROCEDURE UNLIST    
 stent, cardiologist Dr. Khang Hidalgo  HX ORTHOPAEDIC Right ROTATOR CUFF X2 Family History: 
Family History Problem Relation Age of Onset  Cancer Mother  Heart Attack Mother  Cancer Father  Anesth Problems Neg Hx Social History: 
Social History Tobacco Use  Smoking status: Former Smoker Packs/day: 0.50 Years: 19.00 Pack years: 9.50  Smokeless tobacco: Former User Quit date: 2/28/1984 Substance Use Topics  Alcohol use: No  
 Drug use: No  
 
 
Allergies: Allergies Allergen Reactions  Darvocet A500 [Propoxyphene N-Acetaminophen] Other (comments)  
  floaty/high feeling Review of Systems Review of Systems Constitutional: Negative for chills, diaphoresis, fatigue and fever. HENT: Negative for ear pain and sore throat. Eyes: Negative for pain and redness. Respiratory: Negative for cough and shortness of breath. Cardiovascular: Negative for chest pain and leg swelling. Gastrointestinal: Negative for abdominal pain, diarrhea, nausea and vomiting. Endocrine: Negative for cold intolerance and heat intolerance. Genitourinary: Negative for flank pain and hematuria. Musculoskeletal: Negative for back pain and neck stiffness. Skin: Negative for rash and wound. Neurological: Negative for dizziness, syncope and headaches. All other systems reviewed and are negative. Physical Exam  
Physical Exam  
Constitutional: He is oriented to person, place, and time. He appears well-developed and well-nourished. HENT:  
Head: Normocephalic and atraumatic. Mouth/Throat: Oropharynx is clear and moist. No oropharyngeal exudate. Eyes: Pupils are equal, round, and reactive to light. Conjunctivae and EOM are normal.  
Neck: Normal range of motion. Cardiovascular: Normal rate and regular rhythm. No murmur heard. Pulmonary/Chest: Effort normal and breath sounds normal. No respiratory distress. He has no wheezes. Abdominal: Soft. Bowel sounds are normal. He exhibits no distension. There is no tenderness. Musculoskeletal: Normal range of motion. He exhibits no edema or deformity. Neurological: He is alert and oriented to person, place, and time. Coordination normal.  
Patient is alert and oriented x3. Cranial nerves II through XII are intact. He has no facial droop, pronator drift, or any lower extremity weakness. He has no sensory deficits in his face or any 4 of his extremities. He has no hyperreflexia.   He has a normal gait, normal speech pattern, no notable cognitive deficits. Skin: Skin is warm and dry. No rash noted. Psychiatric: He has a normal mood and affect. His behavior is normal.  
Nursing note and vitals reviewed. Diagnostic Study Results Labs - Recent Results (from the past 24 hour(s)) EKG, 12 LEAD, INITIAL Collection Time: 08/11/19  5:38 PM  
Result Value Ref Range Ventricular Rate 73 BPM  
 Atrial Rate 73 BPM  
 P-R Interval 182 ms QRS Duration 84 ms Q-T Interval 408 ms QTC Calculation (Bezet) 449 ms Calculated P Axis 56 degrees Calculated R Axis -5 degrees Calculated T Axis 43 degrees Diagnosis Sinus rhythm with occasional premature ventricular complexes Anteroseptal infarct (cited on or before 18-FEB-2009) When compared with ECG of 22-MAY-2019 15:37, No significant change was found CBC WITH AUTOMATED DIFF Collection Time: 08/11/19  5:39 PM  
Result Value Ref Range WBC 5.2 4.1 - 11.1 K/uL  
 RBC 3.46 (L) 4.10 - 5.70 M/uL HGB 9.3 (L) 12.1 - 17.0 g/dL HCT 30.1 (L) 36.6 - 50.3 % MCV 87.0 80.0 - 99.0 FL  
 MCH 26.9 26.0 - 34.0 PG  
 MCHC 30.9 30.0 - 36.5 g/dL  
 RDW 13.5 11.5 - 14.5 % PLATELET 022 229 - 435 K/uL MPV 9.7 8.9 - 12.9 FL  
 NRBC 0.0 0  WBC ABSOLUTE NRBC 0.00 0.00 - 0.01 K/uL NEUTROPHILS 53 32 - 75 % LYMPHOCYTES 33 12 - 49 % MONOCYTES 11 5 - 13 % EOSINOPHILS 2 0 - 7 % BASOPHILS 1 0 - 1 % IMMATURE GRANULOCYTES 0 0.0 - 0.5 % ABS. NEUTROPHILS 2.7 1.8 - 8.0 K/UL  
 ABS. LYMPHOCYTES 1.7 0.8 - 3.5 K/UL  
 ABS. MONOCYTES 0.6 0.0 - 1.0 K/UL  
 ABS. EOSINOPHILS 0.1 0.0 - 0.4 K/UL  
 ABS. BASOPHILS 0.0 0.0 - 0.1 K/UL  
 ABS. IMM. GRANS. 0.0 0.00 - 0.04 K/UL  
 DF AUTOMATED METABOLIC PANEL, COMPREHENSIVE Collection Time: 08/11/19  5:39 PM  
Result Value Ref Range Sodium 140 136 - 145 mmol/L Potassium 3.8 3.5 - 5.1 mmol/L Chloride 105 97 - 108 mmol/L  
 CO2 29 21 - 32 mmol/L  Anion gap 6 5 - 15 mmol/L  
 Glucose 118 (H) 65 - 100 mg/dL BUN 24 (H) 6 - 20 MG/DL Creatinine 1.50 (H) 0.70 - 1.30 MG/DL  
 BUN/Creatinine ratio 16 12 - 20 GFR est AA 56 (L) >60 ml/min/1.73m2 GFR est non-AA 46 (L) >60 ml/min/1.73m2 Calcium 8.2 (L) 8.5 - 10.1 MG/DL Bilirubin, total 0.7 0.2 - 1.0 MG/DL  
 ALT (SGPT) 20 12 - 78 U/L  
 AST (SGOT) 18 15 - 37 U/L Alk. phosphatase 68 45 - 117 U/L Protein, total 6.9 6.4 - 8.2 g/dL Albumin 3.6 3.5 - 5.0 g/dL Globulin 3.3 2.0 - 4.0 g/dL A-G Ratio 1.1 1.1 - 2.2    
TROPONIN I Collection Time: 08/11/19  5:39 PM  
Result Value Ref Range Troponin-I, Qt. <0.05 <0.05 ng/mL CK W/ REFLX CKMB Collection Time: 08/11/19  5:39 PM  
Result Value Ref Range CK 89 39 - 308 U/L  
PROCALCITONIN Collection Time: 08/11/19  6:23 PM  
Result Value Ref Range Procalcitonin 0.1 ng/mL OCCULT BLOOD, STOOL Collection Time: 08/11/19  8:14 PM  
Result Value Ref Range Occult blood, stool POSITIVE (A) NEG Radiologic Studies -  
XR CHEST PORT Final Result IMPRESSION:  
  
No acute process. Stable exam. CT Results  (Last 48 hours) None CXR Results  (Last 48 hours) 08/11/19 1805  XR CHEST PORT Final result Impression:  IMPRESSION:  
   
No acute process. Stable exam.  
   
  
 Narrative:  EXAM:  XR CHEST PORT INDICATION: Weakness COMPARISON: 5/22/2019 TECHNIQUE: Portable AP upright chest view at 1737 hours FINDINGS: Cardiac monitoring wires overlie the thorax. The cardiomediastinal  
contours are stable. The pulmonary vasculature is within normal limits. The lungs and pleural spaces are clear. There is no pneumothorax. The bones and  
upper abdomen are stable. Medical Decision Making I am the first provider for this patient. I reviewed the vital signs, available nursing notes, past medical history, past surgical history, family history and social history. Vital Signs-Reviewed the patient's vital signs. Patient Vitals for the past 24 hrs: 
 Temp Pulse Resp BP SpO2  
08/11/19 2100  74 20 126/68 98 % 08/11/19 2045  74 17 111/82 99 % 08/11/19 2000  66 18 126/72 100 % 08/11/19 1900  73 19 113/76 100 % 08/11/19 1822  69 16 106/60 99 % 08/11/19 1820  67 18 102/60 100 % 08/11/19 1815  69 17 109/61 100 % 08/11/19 1726 98.1 °F (36.7 °C) 70 16 117/66 100 % Pulse Oximetry Analysis -100 % on room air Cardiac Monitor:  
Rate: 70 bpm 
Rhythm: Normal Sinus Rhythm Records Reviewed: Nursing Notes and Old Medical Records Differential Diagnosis: 
 
Patient presenting with generalized fatigue/weakness. Stable vitals and nontoxic appearing. DDx: infection, anemia, electrolyte anomoly (hypo or hyperkalemia, hypomagnesemia), hypothyroid, dehydration, depression, CA, ACS. Will obtain EKG, UA, labwork for any urgent/emergent pathology. Will reassess and monitor while in ED. Provider Notes (Medical Decision Making):  
Patient with signs of upper GI bleed. Hemoglobin of 9, baseline of 3, but hemodynamically stable at this time. On Xarelto which we will hold. Case discussed with Dr. Camilo Bui from GI. Plan for EGD in the morning. Serial hemoglobins as inpatient. IV Protonix given. ED Course:  
 
Initial assessment performed. The patients presenting problems have been discussed, and they are in agreement with the care plan formulated and outlined with them. I have encouraged them to ask questions as they arise throughout their visit. Critical Care Time:  
 
None Disposition: 
Admit Note: 
9:35 PM 
Pt is being admitted by hospitalist. The results of their tests and reason(s) for their admission have been discussed with pt and/or available family. They convey agreement and understanding for the need to be admitted and for admission diagnosis. PLAN: 
1. Current Discharge Medication List  
  
 
2. Follow-up Information None 
  
 
Return to ED if worse Diagnosis Clinical Impression:  
1. Gastrointestinal hemorrhage, unspecified gastrointestinal hemorrhage type

## 2019-08-12 NOTE — ANESTHESIA PREPROCEDURE EVALUATION
Relevant Problems No relevant active problems Anesthetic History Comments: Hx of agitation on emergence, delayed emergence Review of Systems / Medical History Patient summary reviewed, nursing notes reviewed and pertinent labs reviewed Pulmonary Smoker Comments: Former Smoker - 9.5 pack years Neuro/Psych  
 
 
CVA (5-2019, some balace issues) TIA and psychiatric history Comments: Anxiety PTSD Cerebral infarction involving right middle cerebral artery  Cardiovascular Hypertension CHF 
 
CAD and PAD Exercise tolerance: <4 METS Comments: Coronary stent Stenosis of both internal carotid arteries Chronic blood clot in apex of heart GI/Hepatic/Renal 
  
 
 
Renal disease: CRI Comments: Hx GI bleed Endo/Other Arthritis and anemia Other Findings Comments: Hypertensive retinopathy  
chronic back pain Physical Exam 
 
Airway Mallampati: II 
TM Distance: 4 - 6 cm Neck ROM: normal range of motion Mouth opening: Normal 
 
 Cardiovascular Regular rate and rhythm,  S1 and S2 normal,  no murmur, click, rub, or gallop Dental 
No notable dental hx Pulmonary Breath sounds clear to auscultation Abdominal 
GI exam deferred Other Findings Anesthetic Plan ASA: 3 Anesthesia type: total IV anesthesia Induction: Intravenous Anesthetic plan and risks discussed with: Patient

## 2019-08-12 NOTE — DISCHARGE SUMMARY
Hospitalist Discharge Summary Patient ID: Devyn Guerra [id number] 
61 y.o. 
1945 8/11/2019 PCP on record: Camille Gomes MD 
 
Admit date: 8/11/2019 Discharge date and time: 8/12/2019 DISCHARGE DIAGNOSIS: 
 
Acute blood loss anemia due to gastric ulcer with antral gastritis and positive sonya test  
CAD s/p stents in the past/HTN Chronic systolic HF EF 21%  
CKD stage III  
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 
  
 
CONSULTATIONS: 
IP CONSULT TO GASTROENTEROLOGY Excerpted HPI from H&P of Moises Mac MD: Daryle Schooner is a 68 y.o.  male who presents with above complaint. Pt started with lightheaded when he went to [employer] this morning. He is on BP monitoring program at home. His BP today was in 90s. At one point he felt that he was about to pass out. No CP/dyspnea. No HA/weakness. He is on Xarelto. In ED Hb was found to be at 9.3. His baseline Hb ~ 12. He admits to dark stools. Stool in ED was found to be hem positive. No abdominal pain.  
  
We were asked to admit for work up and evaluation of the above problems. ______________________________________________________________________ DISCHARGE SUMMARY/HOSPITAL COURSE:  for full details see H&P, daily progress notes, labs, consult notes. The pt was admitted to acute inpt medicine with blood loss anemia. He underwent an egd which showed a gastric ulcer with no active bleeding. Sonya test was positive and he is planned to complete 2 weeks of augmentin/clarithromycin/ppi. He will fu with gi for biopsy results. He will resume eliquis and aspirin in 72 hrs post egd. 
 
 
 
_______________________________________________________________________ Patient seen and examined by me on discharge day. Pertinent Findings: 
Gen:    Not in distress Chest: Clear lungs CVS:   Regular rhythm. No edema Abd:  Soft, not distended, not tender Neuro:  Alert, nad _______________________________________________________________________ DISCHARGE MEDICATIONS:  
Current Discharge Medication List  
  
START taking these medications Details  
amoxicillin (AMOXIL) 500 mg capsule Take 2 Caps by mouth every twelve (12) hours. Qty: 28 Cap, Refills: 0  
  
clarithromycin (BIAXIN) 500 mg tablet Take 1 Tab by mouth two (2) times a day. Qty: 28 Tab, Refills: 0  
  
pantoprazole (PROTONIX) 40 mg tablet Take 1 Tab by mouth Before breakfast and dinner. Take 1 tablet twice a day x 2 weeks and then daily there after 
Qty: 28 Tab, Refills: 4 CONTINUE these medications which have CHANGED Details  
ezetimibe (ZETIA) 10 mg tablet Take 1 Tab by mouth daily. Hold for 2 weeks while taking antibiotics Qty: 30 Tab, Refills: 0 Associated Diagnoses: Hypercholesterolemia; Anemia; Benign prostate hyperplasia; Prostate cancer screening; CAD (coronary artery disease); HTN (hypertension), benign  
  
atorvastatin (LIPITOR) 40 mg tablet Take 1 Tab by mouth nightly. Hold for 2 weeks while taking antibioticx Qty: 30 Tab, Refills: 0  
  
ranolazine ER (RANEXA) 500 mg SR tablet Take 1 Tab by mouth two (2) times a day. Hold ranexa for two weeks while taking antibiotics Qty: 30 Tab, Refills: 0  
  
amLODIPine (NORVASC) 10 mg tablet Take 1 Tab by mouth daily. Hold norvasc for 2 weeks while taking antibiotics Qty: 30 Tab, Refills: 0 CONTINUE these medications which have NOT CHANGED Details  
olmesartan-hydroCHLOROthiazide (BENICAR HCT) 40-12.5 mg per tablet TK 1 T PO ONCE D FOR HYPERTENSION Refills: 5  
  
ondansetron (ZOFRAN ODT) 4 mg disintegrating tablet Take 1 Tab by mouth every eight (8) hours as needed for Nausea. Qty: 20 Tab, Refills: 0  
  
fluticasone propionate (FLONASE) 50 mcg/actuation nasal spray 1 [place] by Both Nostrils route two (2) times daily as needed for Rhinitis. carvedilol (COREG CR) 80 mg CR capsule Take 80 mg by mouth daily (with breakfast). Refills: 5  
  
potassium chloride (K-DUR, KLOR-CON) 20 mEq tablet Take 20 mEq by mouth daily. Refills: 5 STOP taking these medications  
  
 rivaroxaban (XARELTO) 20 mg tab tablet Comments:  
Reason for Stopping:   
   
 aspirin delayed-release 81 mg tablet Comments:  
Reason for Stopping:   
   
  
 
 
 
Patient Follow Up Instructions: Activity: Activity as tolerated Diet: Regular Diet Wound Care: None needed Follow-up with gi in 1 week. Follow-up tests/labs none Follow-up Information Follow up With Specialties Details Why Contact Info Diamond Flores MD Internal Medicine   [address] 
[phone] Fanny Lawson MD Gastroenterology In 1 week ulcer [address] [facility] [address] [address] [phone] 
  
  
 
________________________________________________________________ Risk of deterioration: High 
 
Condition at Discharge:  Stable 
__________________________________________________________________ Disposition Home with family, no needs 
 
____________________________________________________________________ Code Status: Full Code 
___________________________________________________________________ Total time in minutes spent coordinating this discharge (includes going over instructions, follow-up, prescriptions, and preparing report for sign off to her PCP) :  31 minutes Signed: 
Kisha Omalley DO

## 2019-08-12 NOTE — PHYSICIAN ADVISORY
Short Stay Review Pt Name:  Rekha Seth MR#  [id number] CSN#   [id number] Room and [facility]  @ [facility] Hospitalization date  8/11/2019  5:29 PM 
No discharge date for patient encounter. Current Attending Physician  Ifeoma Bridges DO  
 
A discharge order has been placed for this episode of hospital care for Mr. Rekha Seth; since this hospital stay is less than two midnights, I reviewed Mr. Lizbeth Rouse's chart. Mr. Lizbeth Rouse's healthcare insurance/benefit include: 
Payor: [place] MED[place]ARE / Plan: VA MEDICARE PART A & B / Product Type: Medicare /  
 
Utilization Review related case summary:  
Age  68 y.o.  
BMI  Body mass index is 26.66 kg/m². PMHx includes  CAD, HTN, CKD, CVA Hospital course  The pt presented with signs of GI bleed EGD showed Gastric ulcer and gastritis Risk of deterioration at the time this patient  was hospitalized     Moderate On the basis of chart review, this patient's hospitalization status     
is appropriate for INPATIENT Courtney Robert MD MPH FACP Cell : [phone] Physician Advisor Anil [id number] [facility] [address]  
Utilization Review, Care Management CSN:  [id number] KRYSTA:    
Admitted on :  8/11/2019 Discharge order

## 2019-08-12 NOTE — PROGRESS NOTES
Amy Nations 1945 
[id number] Situation: 
Verbal report received from: Marco A Domínguez Procedure: Procedure(s) with comments: 
ESOPHAGOGASTRODUODENOSCOPY (EGD) - Room 2139 ESOPHAGOGASTRODUODENAL (EGD) BIOPSY Background: 
 
Preoperative diagnosis: anemia Postoperative diagnosis: hiatal hernia, gastritis, antrum ulcer :  Dr. Jazmin Sandoval Assistant(s): Endoscopy Technician-1: Fatou Reagan Endoscopy RN-1: Farhad Patterson RN Endoscopy RN-2: Ron Jones RN Specimens:  
ID Type Source Tests Collected by Time Destination 1 : antrum ulcer biopsy Preservative   Alissa Thomas MD 8/12/2019 1135 Pathology 2 : g e junction biopsy Preservative   Alissa Thomas MD 8/12/2019 1136 Pathology H. Pylori  Yes and is positive Assessment: 
Intra-procedure medication Anesthesia gave intra-procedure sedation and medications, see anesthesia flow sheet yes Intravenous fluids: NS@ Tedra Melissa Vital signs stable Abdominal assessment: round and soft Recommendation: 
Discharge patient per MD order Dahiana Betancourt Return to floor yes room 2139 Family or Friend  Girlfriend will be here soon Permission to share finding with family or friend yes

## 2019-08-12 NOTE — PROGRESS NOTES
TRANSFER - IN REPORT: 
 
Verbal report received from Gerald Evans (name) on Nora Shepherd  being received from [facility] (unit) for ordered procedure Report consisted of patients Situation, Background, Assessment and  
Recommendations(SBAR). Information from the following report(s) SBAR, Kardex, [facility] and Recent Results was reviewed with the receiving nurse. Opportunity for questions and clarification was provided. Patient's procedure is schedules at 12:00.

## 2019-08-12 NOTE — PROCEDURES
NAME:  Rene Parsons :   1945 MRN:   [id number] Date/Time:  2019 11:37 AM 
 
Esophagogastroduodenoscopy (EGD) Procedure Note Procedure: Esophagogastroduodenoscopy with biopsy Indication:  Melena/hematochezia, Occult blood in stool with possible UGI origin Pre-operative Diagnosis: see indication above Post-operative Diagnosis: see findings below Primary GI:  Christopher Scales MD 
:  Matilde Flores MD 
Referring Provider:   -Rocio Grant MD 
 
Exam: Airway: clear, no airway problems anticipated Heart: RRR, without gallops or rubs Lungs: clear bilaterally without wheezes, crackles, or rhonchi Abdomen: soft, nontender, nondistended, bowel sounds present Mental Status: awake, alert and oriented to person, place and time Anethesia/Sedation:  MAC anesthesia Propofol 150mg IV Procedure Details After informed consent was obtained for the procedure, with all risks and benefits of procedure explained the patient was taken to the endoscopy suite and placed in the left lateral decubitus position. Following sequential administration of sedation as per above, the [id number] gastroscope was inserted into the mouth and advanced under direct vision to second portion of the duodenum. A careful inspection was made as the gastroscope was withdrawn, including a retroflexed view of the proximal stomach; findings and interventions are described below. Findings:   
-Normal esophageal mucosa; biopsied to exclude inflammation 
-Small 2cm hiatal hernia from 42-44cm -Single shallow gastric antral ulceration without vessel or active bleeding; biopsied 
-Associated antral gastritis; CHAUNCEY test obtained and noted to be positive prior to completion of endoscopy 
-Normal duodenal mucosa Therapies:  biopsy of esophagus; biopsy of stomach Specimens: CHAUNCEY test; #1 gastric ulcer bx; #2 g-e jxn EBL:  None. Complications:   None; patient tolerated the procedure well. Impression:   
-Normal esophageal mucosa; biopsied to exclude inflammation 
-Small 2cm hiatal hernia from 42-44cm -Single shallow gastric antral ulceration without vessel or active bleeding; biopsied 
-Associated antral gastritis; CHAUNCEY test obtained 
-Normal duodenal mucosa Recommendations: 
-Acid suppression with a proton pump inhibitor can be given BID orally 
-Await pathology. ,  
-Treat for positive CHAUNCEY test result to eradicate Helicobacter pylori; hold Statin-agent during treatment 
-Follow symptoms. ,  
-Resume diet 
-Resume ASA and Xaralto in 3 days 
-Consider for discharge home later today Discharge disposition:  To room after recovery in Endoscopy Ricardo Gasca MD

## 2019-08-12 NOTE — PERIOP NOTES
Anesthesia reports 150mg Propofol, 100mg Lidocaine and 200mL NS given during procedure. Received report from anesthesia staff on vital signs and status of patient.

## 2019-08-12 NOTE — PROGRESS NOTES
.I have reviewed discharge instructions with the patient. The patient stated understanding instructions and had no further questions. PIV removed. Telemetry removed. [facility]

## 2019-08-12 NOTE — CONSULTS
GI Consultation Note Amada Malhotraer) NAME: Kelsie Santana : 1945 MRN: [id number] ATTG: MD Mal Prim GI: Elois Aase, MD  PCP: Marisol Hwang MD 
Date/Time:  2019 7:51 AM 
Subjective:  
REASON FOR CONSULT:     
Ramirez Hood is a 68 y.o.  male with hx CAD w/ EF 35% on Xarelto, last dose yesterday and CVA, on ASA, who I was asked to see for evaluation of GIB. He presented to ER yesterday c/o LH and dizziness, beginning on rising that AM, with SBP 90s, feeling like he might pass out. He described dark stool which were variable formed and then soft, but denied abdominal pain, N/V, CP, SOB, BRBPR, or hematemesis. He is not on any GI bleeding prophylaxis as OP. ER eval noted Hgb 9.3 (now 8.7 with baseline 12), BUN/Cr 24/1. 50. Stool noted to be guaiac positive. He has had no further BMs since admission. He states he last underwent colonoscopy with Yan Hansen 5 years ago at [facility] with no polyps noted per pt. Marlys Schwab was contacted by me today and notes he is not in [place] and asks that we take care of his patient at this time. Past Medical History:  
Diagnosis Date  Anxiety state, unspecified 2013  Arthritis  CAD (coronary artery disease)  Cataract cortical, senile 2013  Heart failure ([facility] [address] MI   Hypertension  Hypertensive retinopathy 2013  [facility]ation for other specified suspected conditions 2013 [facility]  Other ill-defined conditions(799.89) chronic back pain  Other ill-defined conditions(799.89) AGENT ORANGE EXPOSURE,   
 Psychiatric disorder PTSD, NO MEDS  Stroke ([address] TIA R HAND, RESOLVED  Unspecified adverse effect of anesthesia SLOW TO WAKE, AGITATED WHEN HE WAKES  
 Unspecified reason for consultation 2013 Health maintenance Past Surgical History:  
Procedure Laterality Date [address] UNLISTED    
 x2 hernia repairs  CARDIAC SURG PROCEDURE UNLIST    
 stent, cardiologist Dr. Quan Summers  HX ORTHOPAEDIC Right ROTATOR CUFF X2 Social History Tobacco Use  Smoking status: Former Smoker Packs/day: 0.50 Years: 19.00 Pack years: 9.50  Smokeless tobacco: Former User Quit date: 1984 Substance Use Topics  Alcohol use: No  
  
Family History Problem Relation Age of Onset  Cancer Mother  Heart Attack Mother  Cancer Father  Anesth Problems Neg Hx Allergies Allergen Reactions  Darvocet A500 [Propoxyphene N-Acetaminophen] Other (comments)  
  floaty/high feeling Home Medications: 
Prior to Admission Medications Prescriptions Last Dose Informant Patient Reported? Taking? amLODIPine (NORVASC) 10 mg tablet  Self Yes No  
Sig: Take 10 mg by mouth daily. aspirin delayed-release 81 mg tablet  Self Yes No  
Sig: Take 81 mg by mouth daily. atorvastatin (LIPITOR) 40 mg tablet  Self Yes No  
Sig: Take 40 mg by mouth nightly. carvedilol (COREG CR) 80 mg CR capsule  Self Yes No  
Sig: Take 80 mg by mouth daily (with breakfast). ezetimibe (ZETIA) 10 mg tablet  Self Yes No  
Sig: Take 10 mg by mouth daily. fluticasone propionate (FLONASE) 50 mcg/actuation nasal spray  Self Yes No  
[place] by Both Nostrils route two (2) times daily as needed for Rhinitis. olmesartan-hydroCHLOROthiazide (BENICAR HCT) 40-12.5 mg per tablet   Yes No  
Sig: TK 1 T PO ONCE D FOR HYPERTENSION  
ondansetron (ZOFRAN ODT) 4 mg disintegrating tablet   No No  
Sig: Take 1 Tab by mouth every eight (8) hours as needed for Nausea. potassium chloride (K-DUR, KLOR-CON) 20 mEq tablet  Self Yes No  
Sig: Take 20 mEq by mouth daily. ranolazine ER (RANEXA) 500 mg SR tablet   Yes No  
Sig: Take  by mouth two (2) times a day. rivaroxaban (XARELTO) 20 mg tab tablet   No No  
Sig: Take 1 Tab by mouth daily. Facility-Administered Medications: None Hospital medications: 
Current Facility-Administered Medications Medication Dose Route Frequency  ondansetron (ZOFRAN) injection 4 mg  4 mg IntraVENous Q4H PRN  
 atorvastatin (LIPITOR) tablet 40 mg  40 mg Oral QHS  sodium chloride (NS) flush 5-40 mL  5-40 mL IntraVENous Q8H  
 sodium chloride (NS) flush 5-40 mL  5-40 mL IntraVENous PRN  
 dextrose 5% and 0.9% NaCl infusion  25 mL/hr IntraVENous CONTINUOUS  
 acetaminophen (TYLENOL) tablet 650 mg  650 mg Oral Q4H PRN  
 naloxone (NARCAN) injection 0.4 mg  0.4 mg IntraVENous PRN  pantoprazole (PROTONIX) 40 mg in sodium chloride 0.9% 10 mL injection  40 mg IntraVENous Q12H  
 
REVIEW OF SYSTEMS:   
 [x]    Total of 11 systems reviewed as follows: 
Const:   negative fever, negative chills, negative weight loss Eyes:   negative diplopia or visual changes, negative eye pain ENT:   negative coryza, negative sore throat Resp:   negative cough, hemoptysis, dyspnea Cards:   negative for chest pain, palpitations, lower extremity edema :  negative for frequency, dysuria and hematuria Skin:   negative for rash and pruritus Heme:   negative for easy bruising and gum/nose bleeding MS:  negative for myalgias, arthralgias, back pain and muscle weakness Neurolo:  negative for headaches, dizziness, vertigo, memory problems Psych:   negative for feelings of anxiety, depression Pertinent Positives include : 
 
Objective: VITALS:   
Visit Vitals /62 Pulse 60 Temp 98.3 °F (36.8 °C) Resp 16 Ht 5' 9\" (1.753 m) Wt 81.9 kg (180 lb 8.9 oz) SpO2 100% BMI 26.66 kg/m² Temp (24hrs), Av.1 °F (36.7 °C), Min:97.5 °F (36.4 °C), Max:98.3 °F (36.8 °C) PHYSICAL EXAM:  
General:    Alert, cooperative, no distress, appears stated age. Head:   Normocephalic, without obvious abnormality, atraumatic. Eyes:   Conjunctivae clear, anicteric sclerae. Pupils are equal 
Nose:  Nares normal. No drainage or sinus tenderness. Throat:    Lips, mucosa, and tongue normal.  No Thrush Neck:  Supple, symmetrical,  no adenopathy, thyroid: non tender Back:    Symmetric,  No CVA tenderness. Lungs:   CTA bilaterally. No wheezing/rhonchi/rales. Chest wall:  No tenderness or deformity. No Accessory muscle use. Heart:   Regular rate and rhythm,  no murmur, rub or gallop. Abdomen:   Soft, non-tender. Not distended. Bowel sounds normal. No masses Extremities: Atraumatic, No cyanosis. No edema. No clubbing Skin:     Texture, turgor normal. No rashes/lesions/jaundice Lymph:  Cervical, supraclavicular normal. 
Psych:  Good insight. Not depressed. Not anxious or agitated. Neurologic: EOMs intact. No facial asymmetry/aphasia/slurred speech. Normal strength, A/O X 3. LAB DATA REVIEWED:   
Recent Results (from the past 48 hour(s)) EKG, 12 LEAD, INITIAL Collection Time: 08/11/19  5:38 PM  
Result Value Ref Range Ventricular Rate 73 BPM  
 Atrial Rate 73 BPM  
 P-R Interval 182 ms QRS Duration 84 ms Q-T Interval 408 ms QTC Calculation (Bezet) 449 ms Calculated P Axis 56 degrees Calculated R Axis -5 degrees Calculated T Axis 43 degrees Diagnosis Sinus rhythm with occasional premature ventricular complexes Anteroseptal infarct (cited on or before 18-FEB-2009) When compared with ECG of 22-MAY-2019 15:37, No significant change was found CBC WITH AUTOMATED DIFF Collection Time: 08/11/19  5:39 PM  
Result Value Ref Range WBC 5.2 4.1 - 11.1 K/uL  
 RBC 3.46 (L) 4.10 - 5.70 M/uL HGB 9.3 (L) 12.1 - 17.0 g/dL HCT 30.1 (L) 36.6 - 50.3 % MCV 87.0 80.0 - 99.0 FL  
 MCH 26.9 26.0 - 34.0 PG  
 MCHC 30.9 30.0 - 36.5 g/dL  
 RDW 13.5 11.5 - 14.5 % PLATELET 417 017 - 735 K/uL MPV 9.7 8.9 - 12.9 FL  
 NRBC 0.0 0  WBC ABSOLUTE NRBC 0.00 0.00 - 0.01 K/uL NEUTROPHILS 53 32 - 75 % LYMPHOCYTES 33 12 - 49 % MONOCYTES 11 5 - 13 % EOSINOPHILS 2 0 - 7 % BASOPHILS 1 0 - 1 % IMMATURE GRANULOCYTES 0 0.0 - 0.5 % ABS. NEUTROPHILS 2.7 1.8 - 8.0 K/UL  
 ABS. LYMPHOCYTES 1.7 0.8 - 3.5 K/UL  
 ABS. MONOCYTES 0.6 0.0 - 1.0 K/UL  
 ABS. EOSINOPHILS 0.1 0.0 - 0.4 K/UL  
 ABS. BASOPHILS 0.0 0.0 - 0.1 K/UL  
 ABS. IMM. GRANS. 0.0 0.00 - 0.04 K/UL  
 DF AUTOMATED METABOLIC PANEL, COMPREHENSIVE Collection Time: 08/11/19  5:39 PM  
Result Value Ref Range Sodium 140 136 - 145 mmol/L Potassium 3.8 3.5 - 5.1 mmol/L Chloride 105 97 - 108 mmol/L  
 CO2 29 21 - 32 mmol/L Anion gap 6 5 - 15 mmol/L Glucose 118 (H) 65 - 100 mg/dL BUN 24 (H) 6 - 20 MG/DL Creatinine 1.50 (H) 0.70 - 1.30 MG/DL  
 BUN/Creatinine ratio 16 12 - 20 GFR est AA 56 (L) >60 ml/min/1.73m2 GFR est non-AA 46 (L) >60 ml/min/1.73m2 Calcium 8.2 (L) 8.5 - 10.1 MG/DL Bilirubin, total 0.7 0.2 - 1.0 MG/DL  
 ALT (SGPT) 20 12 - 78 U/L  
 AST (SGOT) 18 15 - 37 U/L Alk. phosphatase 68 45 - 117 U/L Protein, total 6.9 6.4 - 8.2 g/dL Albumin 3.6 3.5 - 5.0 g/dL Globulin 3.3 2.0 - 4.0 g/dL A-G Ratio 1.1 1.1 - 2.2    
TROPONIN I Collection Time: 08/11/19  5:39 PM  
Result Value Ref Range Troponin-I, Qt. <0.05 <0.05 ng/mL CK W/ REFLX CKMB Collection Time: 08/11/19  5:39 PM  
Result Value Ref Range CK 89 39 - 308 U/L  
PROCALCITONIN Collection Time: 08/11/19  6:23 PM  
Result Value Ref Range Procalcitonin 0.1 ng/mL OCCULT BLOOD, STOOL Collection Time: 08/11/19  8:14 PM  
Result Value Ref Range Occult blood, stool POSITIVE (A) NEG    
TYPE & SCREEN Collection Time: 08/11/19  8:29 PM  
Result Value Ref Range Crossmatch Expiration 08/14/2019 ABO/Rh(D) A POSITIVE Antibody screen NEG METABOLIC PANEL, BASIC Collection Time: 08/12/19  2:31 AM  
Result Value Ref Range Sodium 142 136 - 145 mmol/L Potassium 3.8 3.5 - 5.1 mmol/L Chloride 109 (H) 97 - 108 mmol/L  
 CO2 26 21 - 32 mmol/L Anion gap 7 5 - 15 mmol/L Glucose 117 (H) 65 - 100 mg/dL BUN 20 6 - 20 MG/DL Creatinine 1.26 0.70 - 1.30 MG/DL  
 BUN/Creatinine ratio 16 12 - 20 GFR est AA >60 >60 ml/min/1.73m2 GFR est non-AA 56 (L) >60 ml/min/1.73m2 Calcium 7.9 (L) 8.5 - 10.1 MG/DL  
CBC W/O DIFF Collection Time: 08/12/19  2:31 AM  
Result Value Ref Range WBC 4.8 4.1 - 11.1 K/uL  
 RBC 3.26 (L) 4.10 - 5.70 M/uL HGB 8.7 (L) 12.1 - 17.0 g/dL HCT 27.8 (L) 36.6 - 50.3 % MCV 85.3 80.0 - 99.0 FL  
 MCH 26.7 26.0 - 34.0 PG  
 MCHC 31.3 30.0 - 36.5 g/dL  
 RDW 13.2 11.5 - 14.5 % PLATELET 123 948 - 101 K/uL MPV 9.3 8.9 - 12.9 FL  
 NRBC 0.0 0  WBC ABSOLUTE NRBC 0.00 0.00 - 0.01 K/uL MAGNESIUM Collection Time: 08/12/19  2:31 AM  
Result Value Ref Range Magnesium 2.1 1.6 - 2.4 mg/dL PHOSPHORUS Collection Time: 08/12/19  2:31 AM  
Result Value Ref Range Phosphorus 2.7 2.6 - 4.7 MG/DL IMAGING RESULTS: 
 [x]      I have personally reviewed the actual   [x]    CXR nl 
 
Recommendations/Plan: Active Problems: 
  Acute blood loss anemia (8/11/2019) GI bleed (8/11/2019) 
 
  
___________________________________________________ RECOMMENDATIONS:   
68yo M on anticoagulation with symptomactic acute blood loss anemia manifest as dark soft stool and anemia with near syncope. Symptoms likely exacerbated by combination of ASA and Xarelto. He is not on any GI bleeding prophylaxis as OP. Plan: 
1) Keep NPO 
2) IVF 
3) IV PPI Q12hrs for now 4) Hold ASAS and Xarelto 5) EGD today 6) Serial H/H and transfuse PRN to keep Hgb >7 
7) Type and Scree Discussed Code Status:    []    Full Code      []    DNR   
___________________________________________________ Care Plan discussed with: 
  [x]    Patient   []    Family   [x]    Nursing   []    Attending Total Time :  50  minutes 
 ___________________________________________________ GI: J Carlos Castro MD

## 2019-08-12 NOTE — PROGRESS NOTES
General Surgery End of Shift Nursing Note Bedside shift change report given to [address] [place] [address] (oncoming nurse) by Stephanie Rodriguez (offgoing nurse). Report included the following information SBAR, Kardex, Intake/Output, MAR and Recent Results. Shift worked:   7p-7a Summary of shift:    Admitted to unit from ER at . Patient had no complaints overnight rested well. Issues for physician to address:     
 
Number times ambulated in hallway past shift: 1 Number of times OOB to chair past shift: 0 Pain Management: 
Current medication: None requested GI: 
 
Current diet:  DIET NPO Except Meds Tolerating current diet: YES Passing flatus: YES Last Bowel Movement: yesterday Respiratory: 
 
Incentive Spirometer at bedside: NO 
Patient instructed on use: NO 
 
Patient Safety: 
 
Falls Score: 2 Bed Alarm On? No 
Sitter? No 
 
Glenda Weathers

## 2019-08-12 NOTE — PERIOP NOTES
TRANSFER - OUT REPORT: 
 
Verbal report given to Pat rn(name) on Devyn Guerra  being transferred to 2139(unit) for routine progression of care Report consisted of patients Situation, Background, Assessment and  
Recommendations(SBAR). Information from the following report(s) Procedure Summary was reviewed with the receiving nurse. Lines:    
 
Opportunity for questions and clarification was provided.    
 
Patient transported with:

## 2019-08-12 NOTE — PROGRESS NOTES
Problem: Falls - Risk of 
Goal: *Absence of Falls Description Document Pat Crain Fall Risk and appropriate interventions in the flowsheet. Outcome: Progressing Towards Goal 
Note:  
Fall Risk Interventions: 
  
 
  
 
Medication Interventions: Teach patient to arise slowly Problem: Patient Education: Go to Patient Education Activity Goal: Patient/Family Education Outcome: Progressing Towards Goal

## 2019-08-12 NOTE — H&P
Hospitalist Admission Note NAME: Adrián Hammer :  1945 MRN:  [id number] Date/Time:  2019 9:43 PM 
 
Patient PCP: Magnolia Damon MD 
______________________________________________________________________ Given the patient's current clinical presentation, I have a high level of concern for decompensation if discharged from the emergency department. Complex decision making was performed, which includes reviewing the patient's available past medical records, laboratory results, and x-ray films. My assessment of this patient's clinical condition and my plan of care is as follows. Assessment / Plan: 
Acute blood loss anemia due to likely UGI bleeding in settings of Xarelto  
-Sx with dizziness Not orthostatic in ED but BP low side in 90/100s 
-GI consulted: keep NPO, EGD in am 
-hold Xarelto/ ASA 
-closely monitor h/h, transfuse if hb < 7.0 or hypotensive - c/w IV PPI  
-IVF  
-blood consent signed and placed on chart Type and screen done CAD s/p stents in the past/HTN Chronic systolic HF EF 23% 
-BP low side; no signs of fluid overload  
-holding BP meds -ECHO 2019:Left Apical Thrombus--> Placed on Xarelto 20 mg daily CKD stage III Cr at baseline, monitor closely. Avoid nephrotoxic drugs, adjust all meds to GFR. H/o stroke 2019 cerebellar, holding ASA Hyperlipidemia, cont statin Code Status: Full code Surrogate Decision Maker: Sister Dino Winchester DVT Prophylaxis: SCD  
GI Prophylaxis: not indicated Baseline: independent Subjective: CHIEF COMPLAINT: dizziness HISTORY OF PRESENT ILLNESS:    
Jon Alejandre is a 68 y.o.  male who presents with above complaint. Pt started with lightheaded when he went to [employer] this morning. He is on BP monitoring program at home. His BP today was in 90s. At one point he felt that he was about to pass out. No CP/dyspnea.  No HA/weakness. He is on Xarelto. In ED Hb was found to be at 9.3. His baseline Hb ~ 12. He admits to dark stools. Stool in ED was found to be hem positive. No abdominal pain. We were asked to admit for work up and evaluation of the above problems. Past Medical History:  
Diagnosis Date  Anxiety state, unspecified 8/27/2013  Arthritis  CAD (coronary artery disease)  Cataract cortical, senile 8/27/2013  Heart failure ([facility] [address] [place] 1993  Hypertension  Hypertensive retinopathy 8/27/2013  [facility] for other specified suspected conditions [facility]  Other ill-defined conditions(799.89) chronic back pain  Other ill-defined conditions(799.89) AGENT ORANGE EXPOSURE,   
 Psychiatric disorder PTSD, NO MEDS  Stroke ([facility] [address] TIA R HAND, RESOLVED  Unspecified adverse effect of anesthesia SLOW TO WAKE, AGITATED WHEN HE WAKES  
 Unspecified reason for consultation 8/27/2013 Health maintenance Past Surgical History:  
Procedure Laterality Date  ABDOMEN SURGERY PROC UNLISTED    
 x2 hernia repairs  CARDIAC SURG PROCEDURE UNLIST    
 stent, cardiologist Dr. Maeve Espitia  HX ORTHOPAEDIC Right ROTATOR CUFF X2 Social History Tobacco Use  Smoking status: Former Smoker Packs/day: 0.50 Years: 19.00 Pack years: 9.50  Smokeless tobacco: Former User Quit date: 2/28/1984 Substance Use Topics  Alcohol use: No  
  
 
Family History Problem Relation Age of Onset  Cancer Mother  Heart Attack Mother  Cancer Father  Anesth Problems Neg Hx Allergies Allergen Reactions  Darvocet A500 [Propoxyphene N-Acetaminophen] Other (comments)  
  floaty/high feeling Prior to Admission medications Medication Sig Start Date End Date Taking? Authorizing Provider  
ranolazine ER (RANEXA) 500 mg SR tablet Take  by mouth two (2) times a day.     Provider, Historical  
 olmesartan-hydroCHLOROthiazide (BENICAR HCT) 40-12.5 mg per tablet TK 1 T PO ONCE D FOR HYPERTENSION 5/14/19   Provider, Historical  
ondansetron (ZOFRAN ODT) 4 mg disintegrating tablet Take 1 Tab by mouth every eight (8) hours as needed for Nausea. 5/22/19   Gio Mcneal MD  
rivaroxaban (XARELTO) 20 mg tab tablet Take 1 Tab by mouth daily. 5/21/19   Lina Marie MD  
amLODIPine (NORVASC) 10 mg tablet Take 10 mg by mouth daily. Provider, Historical  
atorvastatin (LIPITOR) 40 mg tablet Take 40 mg by mouth nightly. Provider, Historical  
fluticasone propionate (FLONASE) 50 mcg/actuation nasal spray 1 [place] by Both Nostrils route two (2) times daily as needed for Rhinitis. Provider, Historical  
aspirin delayed-release 81 mg tablet Take 81 mg by mouth daily. 1/13/15   Provider, Historical  
carvedilol (COREG CR) 80 mg CR capsule Take 80 mg by mouth daily (with breakfast). 1/14/15   Provider, Historical  
potassium chloride (K-DUR, KLOR-CON) 20 mEq tablet Take 20 mEq by mouth daily. 1/13/15   Provider, Historical  
ezetimibe (ZETIA) 10 mg tablet Take 10 mg by mouth daily. Provider, Historical  
 
 
REVIEW OF SYSTEMS:    
I am not able to complete the review of systems because: The patient is intubated and sedated The patient has altered mental status due to his acute medical problems The patient has baseline aphasia from prior stroke(s) The patient has baseline dementia and is not reliable historian The patient is in acute medical distress and unable to provide information Total of 12 systems reviewed as follows:   
   POSITIVE= underlined text  Negative = text not underlined General:  fever, chills, sweats, generalized weakness, weight loss/gain,  
   loss of appetite Eyes:    blurred vision, eye pain, loss of vision, double vision ENT:    rhinorrhea, pharyngitis Respiratory:   cough, sputum production, SOB, BRASHER, wheezing, pleuritic pain Cardiology:   chest pain, palpitations, orthopnea, PND, edema, syncope Gastrointestinal:  abdominal pain , N/V, diarrhea, dysphagia, constipation, bleeding Genitourinary:  frequency, urgency, dysuria, hematuria, incontinence Muskuloskeletal :  arthralgia, myalgia, back pain Hematology:  easy bruising, nose or gum bleeding, lymphadenopathy Dermatological: rash, ulceration, pruritis, color change / jaundice Endocrine:   hot flashes or polydipsia Neurological:  headache, dizziness, confusion, focal weakness, paresthesia, Speech difficulties, memory loss, gait difficulty Psychological: Feelings of anxiety, depression, agitation Objective: VITALS:   
Visit Vitals /68 Pulse 74 Temp 98.1 °F (36.7 °C) Resp 20 Ht 5' 9\" (1.753 m) Wt 81.9 kg (180 lb 8.9 oz) SpO2 98% BMI 26.66 kg/m² PHYSICAL EXAM: 
 
 
_______________________________________________________________________ Care Plan discussed with: 
  Comments Patient y Family  y girlfriend bedside RN y   
Care Manager Consultant:  y ED provider _______________________________________________________________________ Expected  Disposition:  
Home with Family y HH/PT/OT/RN   
SNF/LTC   
MARY ANN   
________________________________________________________________________ TOTAL TIME: 65 Minutes Critical Care Provided     Minutes non procedure based Comments Reviewed previous records  
>50% of visit spent in counseling and coordination of care  Discussion with patient and/or family and questions answered 
  
 
________________________________________________________________________ Signed: Naida Strauss MD 
 
Procedures: see electronic medical records for all procedures/Xrays and details which were not copied into this note but were reviewed prior to creation of Plan. LAB DATA REVIEWED:   
Recent Results (from the past 24 hour(s)) EKG, 12 LEAD, INITIAL Collection Time: 08/11/19  5:38 PM  
Result Value Ref Range Ventricular Rate 73 BPM  
 Atrial Rate 73 BPM  
 P-R Interval 182 ms QRS Duration 84 ms Q-T Interval 408 ms QTC Calculation (Bezet) 449 ms Calculated P Axis 56 degrees Calculated R Axis -5 degrees Calculated T Axis 43 degrees Diagnosis Sinus rhythm with occasional premature ventricular complexes Anteroseptal infarct (cited on or before 18-FEB-2009) When compared with ECG of 22-MAY-2019 15:37, No significant change was found CBC WITH AUTOMATED DIFF Collection Time: 08/11/19  5:39 PM  
Result Value Ref Range WBC 5.2 4.1 - 11.1 K/uL  
 RBC 3.46 (L) 4.10 - 5.70 M/uL HGB 9.3 (L) 12.1 - 17.0 g/dL HCT 30.1 (L) 36.6 - 50.3 % MCV 87.0 80.0 - 99.0 FL  
 MCH 26.9 26.0 - 34.0 PG  
 MCHC 30.9 30.0 - 36.5 g/dL  
 RDW 13.5 11.5 - 14.5 % PLATELET 322 465 - 734 K/uL MPV 9.7 8.9 - 12.9 FL  
 NRBC 0.0 0  WBC ABSOLUTE NRBC 0.00 0.00 - 0.01 K/uL NEUTROPHILS 53 32 - 75 % LYMPHOCYTES 33 12 - 49 % MONOCYTES 11 5 - 13 % EOSINOPHILS 2 0 - 7 % BASOPHILS 1 0 - 1 % IMMATURE GRANULOCYTES 0 0.0 - 0.5 % ABS. NEUTROPHILS 2.7 1.8 - 8.0 K/UL  
 ABS. LYMPHOCYTES 1.7 0.8 - 3.5 K/UL  
 ABS. MONOCYTES 0.6 0.0 - 1.0 K/UL  
 ABS. EOSINOPHILS 0.1 0.0 - 0.4 K/UL  
 ABS. BASOPHILS 0.0 0.0 - 0.1 K/UL  
 ABS. IMM. GRANS. 0.0 0.00 - 0.04 K/UL  
 DF AUTOMATED METABOLIC PANEL, COMPREHENSIVE Collection Time: 08/11/19  5:39 PM  
Result Value Ref Range Sodium 140 136 - 145 mmol/L Potassium 3.8 3.5 - 5.1 mmol/L Chloride 105 97 - 108 mmol/L  
 CO2 29 21 - 32 mmol/L Anion gap 6 5 - 15 mmol/L Glucose 118 (H) 65 - 100 mg/dL BUN 24 (H) 6 - 20 MG/DL Creatinine 1.50 (H) 0.70 - 1.30 MG/DL  
 BUN/Creatinine ratio 16 12 - 20 GFR est AA 56 (L) >60 ml/min/1.73m2 GFR est non-AA 46 (L) >60 ml/min/1.73m2 Calcium 8.2 (L) 8.5 - 10.1 MG/DL Bilirubin, total 0.7 0.2 - 1.0 MG/DL  
 ALT (SGPT) 20 12 - 78 U/L  
 AST (SGOT) 18 15 - 37 U/L Alk. phosphatase 68 45 - 117 U/L Protein, total 6.9 6.4 - 8.2 g/dL Albumin 3.6 3.5 - 5.0 g/dL Globulin 3.3 2.0 - 4.0 g/dL A-G Ratio 1.1 1.1 - 2.2    
TROPONIN I Collection Time: 08/11/19  5:39 PM  
Result Value Ref Range Troponin-I, Qt. <0.05 <0.05 ng/mL CK W/ REFLX CKMB Collection Time: 08/11/19  5:39 PM  
Result Value Ref Range CK 89 39 - 308 U/L  
PROCALCITONIN Collection Time: 08/11/19  6:23 PM  
Result Value Ref Range Procalcitonin 0.1 ng/mL OCCULT BLOOD, STOOL Collection Time: 08/11/19  8:14 PM  
Result Value Ref Range Occult blood, stool POSITIVE (A) NEG    
TYPE & SCREEN Collection Time: 08/11/19  8:29 PM  
Result Value Ref Range Crossmatch Expiration 08/14/2019 ABO/Rh(D) A POSITIVE  Antibody screen NEG

## 2019-08-12 NOTE — ANESTHESIA POSTPROCEDURE EVALUATION
Procedure(s): ESOPHAGOGASTRODUODENOSCOPY (EGD) ESOPHAGOGASTRODUODENAL (EGD) BIOPSY. total IV anesthesia Anesthesia Post Evaluation Patient location during evaluation: PACU Note status: Adequate. Level of consciousness: responsive to verbal stimuli and sleepy but conscious Pain management: satisfactory to patient Airway patency: patent Anesthetic complications: no 
Cardiovascular status: acceptable Respiratory status: acceptable Hydration status: acceptable Comments: +Post-Anesthesia Evaluation and Assessment Patient: Frannie James MRN: [id number]  SSN: [id number] YOB: 1945  Age: 68 y.o. Sex: male Cardiovascular Function/Vital Signs /68   Pulse 66   Temp 36.3 °C (97.4 °F)   Resp 18   Ht 5' 9\" (1.753 m)   Wt 81.9 kg (180 lb 8.9 oz)   SpO2 98%   BMI 26.66 kg/m² Patient is status post Procedure(s) with comments: 
ESOPHAGOGASTRODUODENOSCOPY (EGD) - Room 2139 ESOPHAGOGASTRODUODENAL (EGD) BIOPSY. Nausea/Vomiting: Controlled. Postoperative hydration reviewed and adequate. Pain: 
Pain Scale 1: Numeric (0 - 10) (08/12/19 1205) Pain Intensity 1: 0 (08/12/19 1205) Managed. Neurological Status: At baseline. Mental Status and Level of Consciousness: Arousable. Pulmonary Status:  
O2 Device: Room air (08/12/19 1205) Adequate oxygenation and airway patent. Complications related to anesthesia: None Post-anesthesia assessment completed. No concerns. Signed By: Rachel Bynum DO  
 8/12/2019 Post anesthesia nausea and vomiting:  controlled Vitals Value Taken Time /68 8/12/2019 12:05 PM  
Temp 36.3 °C (97.4 °F) 8/12/2019 11:51 AM  
Pulse 66 8/12/2019 12:06 PM  
Resp 0 8/12/2019 12:06 PM  
SpO2 98 % 8/12/2019 12:06 PM  
Vitals shown include unvalidated device data.

## 2019-08-13 NOTE — PROGRESS NOTES
Reason for Admission:   GI Bleed RRAT Score:     20 Do you (patient/family) have any concerns for transition/discharge? No concerns about after hospital care - active with PCP - Dr. Selene Manuel - and [facility] at [address] - all medications from [address] [facility] Plan for utilizing home health:   No currentr home health needs - patient alert and oriented x 4 and independent with all ADL's Current Advanced Directive/Advance Care Plan:  No current ACP and does not want to complete paperwork at present time Transition of Care Plan:       
 
1) Home w/ girlfriend - family also close by 2) D/C after hours -patient to make his own PCP follow-up appt. Care Management Interventions PCP Verified by CM: Yes(Dr. Selene Manuel) Mode of Transport at Discharge: Other (see comment)(Home w/ girlfriend - private vehicle) MyChart Signup: No 
Discharge Durable Medical Equipment: No 
Physical Therapy Consult: No 
Occupational Therapy Consult: No 
Speech Therapy Consult: No 
Current Support Network: Other(Patient has his own home -has been staying w/ girlfriend in one story home) Confirm Follow Up Transport: Family(Usually drives himself - family to help if needed) Discharge Location Discharge Placement: Home(Home ) Colleen Zhao, RN, BSN, ACM [address] [facility]   [phone]

## 2019-08-15 NOTE — PATIENT INSTRUCTIONS
[email or web address] Activation    Thank you for requesting access to [facility]. Please follow the instructions below to securely access and download your online medical record. [facility] allows you to send messages to your doctor, view your test results, renew your prescriptions, schedule appointments, and more. How Do I Sign Up? 1. In your internet browser, go to [email or web address]  2. Click on the First Time User? Click Here link in the Sign In box. You will be redirect to the New Member Sign Up page. 3. Enter your [facility] Access Code exactly as it appears below. You will not need to use this code after youve completed the sign-up process. If you do not sign up before the expiration date, you must request a new code. [facility] Access Code: Activation code not generated  Current [facility] Status: Active (This is the date your [facility] access code will )    4. Enter the last four digits of your Social Security Number (xxxx) and Date of Birth (mm/dd/yyyy) as indicated and click Submit. You will be taken to the next sign-up page. 5. Create a [facility] ID. This will be your [facility] login ID and cannot be changed, so think of one that is secure and easy to remember. 6. Create a [facility] password. You can change your password at any time. 7. Enter your Password Reset Question and Answer. This can be used at a later time if you forget your password. 8. Enter your e-mail address. You will receive e-mail notification when new information is available in [address] 9. Click Sign Up. You can now view and download portions of your medical record. 10. Click the Download Summary menu link to download a portable copy of your medical information. Additional Information    If you have questions, please visit the Frequently Asked Questions section of the [facility] website at [email or web address] Remember, [facility] is NOT to be used for urgent needs. For medical emergencies, dial 911.

## 2019-08-15 NOTE — PROGRESS NOTES
Ade Johnson is a 68 y.o. male and presents with Transitions Of Care; Anemia; and GI Problem  . Subjective:    He has a new ulcer reported. the ulcer was located in the antral region. Hypertension Review:  The patient has hypertension . he states that his bp has recently been low. Diet and Lifestyle: generally follows a low sodium diet, exercises sporadically  Home BP Monitoring: is not measured at home. Pertinent ROS: taking medications as instructed, no medication side effects noted, no TIA's, no chest pain on exertion, no dyspnea on exertion, no swelling of ankles. Dyslipidemia Review:  Patient presents for evaluation of lipids. Compliance with treatment thus far has been excellent. A repeat fasting lipid profile was done. The patient does not use medications that may worsen dyslipidemias . The patient exercises sporadically. Review of Systems  Constitutional: negative for fevers, chills, anorexia and weight loss  Eyes:   negative for visual disturbance and irritation  ENT:   negative for tinnitus,sore throat,nasal congestion,ear pains. hoarseness  Respiratory:  negative for cough, hemoptysis, dyspnea,wheezing  CV:   negative for chest pain, palpitations, lower extremity edema  GI:   negative for nausea, vomiting, diarrhea, abdominal pain,melena  Endo:               negative for polyuria,polydipsia,polyphagia,heat intolerance  Genitourinary: negative for frequency, dysuria and hematuria  Integument:  negative for rash and pruritus  Hematologic:  negative for easy bruising and gum/nose bleeding  Musculoskel: negative for myalgias, arthralgias, back pain, muscle weakness, joint pain  Neurological:  negative for headaches, dizziness, vertigo, memory problems and gait   Behavl/Psych: negative for feelings of anxiety, depression, mood changes    Past Medical History:   Diagnosis Date    Anxiety state, unspecified 8/27/2013    Arthritis     CAD (coronary artery disease)     Cataract cortical, senile 8/27/2013    Heart failure ([facility] [address]     MI 1993    Hypertension     Hypertensive retinopathy 8/27/2013    Observation for other specified suspected conditions 8/27/2013    [facility]    Other ill-defined conditions(799.89)     chronic back pain    Other ill-defined conditions(799.89)     AGENT ORANGE EXPOSURE,     Psychiatric disorder     PTSD, NO MEDS    Stroke ([facility] [address]     TIA R HAND, RESOLVED    Unspecified adverse effect of anesthesia     SLOW TO WAKE, AGITATED WHEN HE WAKES    Unspecified reason for consultation 8/27/2013    Health maintenance     Past Surgical History:   Procedure Laterality Date    ABDOMEN SURGERY PROC UNLISTED      x2 hernia repairs    CARDIAC SURG PROCEDURE UNLIST      stent, cardiologist Dr. Aster Dyer HX ORTHOPAEDIC Right     ROTATOR CUFF X2    UPPER GI ENDOSCOPY,BIOPSY  8/12/2019          Social History     Socioeconomic History    Marital status: SINGLE     Spouse name: Not on file    Number of children: Not on file    Years of education: Not on file    Highest education level: Not on file   Tobacco Use    Smoking status: Former Smoker     Packs/day: 0.50     Years: 19.00     Pack years: 9.50    Smokeless tobacco: Former User     Quit date: 2/28/1984   Substance and Sexual Activity    Alcohol use: No    Drug use: No     Family History   Problem Relation Age of Onset    Cancer Mother     Heart Attack Mother     Cancer Father     Anesth Problems Neg Hx      Current Outpatient Medications   Medication Sig Dispense Refill    amoxicillin (AMOXIL) 500 mg capsule Take 2 Caps by mouth every twelve (12) hours. 28 Cap 0    pantoprazole (PROTONIX) 40 mg tablet Take 1 Tab by mouth Before breakfast and dinner.  Take 1 tablet twice a day x 2 weeks and then daily there after 28 Tab 4    olmesartan-hydroCHLOROthiazide (BENICAR HCT) 40-12.5 mg per tablet TK 1 T PO ONCE D FOR HYPERTENSION  5    fluticasone propionate (FLONASE) 50 mcg/actuation nasal spray 1 Cope by Both Nostrils route two (2) times daily as needed for Rhinitis.  carvedilol (COREG CR) 80 mg CR capsule Take 80 mg by mouth daily (with breakfast). 5    potassium chloride (K-DUR, KLOR-CON) 20 mEq tablet Take 20 mEq by mouth daily. 5    clarithromycin (BIAXIN) 500 mg tablet Take 1 Tab by mouth two (2) times a day. 28 Tab 0    ezetimibe (ZETIA) 10 mg tablet Take 1 Tab by mouth daily. Hold for 2 weeks while taking antibiotics 30 Tab 0    atorvastatin (LIPITOR) 40 mg tablet Take 1 Tab by mouth nightly. Hold for 2 weeks while taking antibioticx 30 Tab 0    ranolazine ER (RANEXA) 500 mg SR tablet Take 1 Tab by mouth two (2) times a day. Hold ranexa for two weeks while taking antibiotics 30 Tab 0    amLODIPine (NORVASC) 10 mg tablet Take 1 Tab by mouth daily.  Hold norvasc for 2 weeks while taking antibiotics 30 Tab 0     Allergies   Allergen Reactions    Darvocet A500 [Propoxyphene N-Acetaminophen] Other (comments)     floaty/high feeling       Objective:  Visit Vitals  BP (!) 80/60 (BP 1 Location: Left arm, BP Patient Position: Sitting)   Pulse 71   Temp 98.2 °F (36.8 °C) (Oral)   Resp 20   Ht 5' 9\" (1.753 m)   Wt 179 lb (81.2 kg)   SpO2 97%   BMI 26.43 kg/m²     Physical Exam:   General appearance - alert, well appearing, and in no distress  Mental status - alert, oriented to person, place, and time  EYE-JARED, EOMI, corneas normal, no foreign bodies  ENT-ENT exam normal, no neck nodes or sinus tenderness  Nose - normal and patent, no erythema, discharge or polyps  Mouth - mucous membranes moist, pharynx normal without lesions  Neck - supple, no significant adenopathy   Chest - clear to auscultation, no wheezes, rales or rhonchi, symmetric air entry   Heart - normal rate, regular rhythm, normal S1, S2, no murmurs, rubs, clicks or gallops   Abdomen - soft, nontender, nondistended, no masses or organomegaly  Lymph- no adenopathy palpable  Ext-peripheral pulses normal, no pedal edema, no clubbing or cyanosis  Skin-Warm and dry. no hyperpigmentation, vitiligo, or suspicious lesions  Neuro -alert, oriented, normal speech, no focal findings or movement disorder noted  Neck-normal C-spine, no tenderness, full ROM without pain  Feet-no nail deformities or callus formation with good pulses noted      Results for orders placed or performed during the hospital encounter of 08/11/19   CBC WITH AUTOMATED DIFF   Result Value Ref Range    WBC 5.2 4.1 - 11.1 K/uL    RBC 3.46 (L) 4.10 - 5.70 M/uL    HGB 9.3 (L) 12.1 - 17.0 g/dL    HCT 30.1 (L) 36.6 - 50.3 %    MCV 87.0 80.0 - 99.0 FL    MCH 26.9 26.0 - 34.0 PG    MCHC 30.9 30.0 - 36.5 g/dL    RDW 13.5 11.5 - 14.5 %    PLATELET 719 502 - 384 K/uL    MPV 9.7 8.9 - 12.9 FL    NRBC 0.0 0  WBC    ABSOLUTE NRBC 0.00 0.00 - 0.01 K/uL    NEUTROPHILS 53 32 - 75 %    LYMPHOCYTES 33 12 - 49 %    MONOCYTES 11 5 - 13 %    EOSINOPHILS 2 0 - 7 %    BASOPHILS 1 0 - 1 %    IMMATURE GRANULOCYTES 0 0.0 - 0.5 %    ABS. NEUTROPHILS 2.7 1.8 - 8.0 K/UL    ABS. LYMPHOCYTES 1.7 0.8 - 3.5 K/UL    ABS. MONOCYTES 0.6 0.0 - 1.0 K/UL    ABS. EOSINOPHILS 0.1 0.0 - 0.4 K/UL    ABS. BASOPHILS 0.0 0.0 - 0.1 K/UL    ABS. IMM. GRANS. 0.0 0.00 - 0.04 K/UL    DF AUTOMATED     METABOLIC PANEL, COMPREHENSIVE   Result Value Ref Range    Sodium 140 136 - 145 mmol/L    Potassium 3.8 3.5 - 5.1 mmol/L    Chloride 105 97 - 108 mmol/L    CO2 29 21 - 32 mmol/L    Anion gap 6 5 - 15 mmol/L    Glucose 118 (H) 65 - 100 mg/dL    BUN 24 (H) 6 - 20 MG/DL    Creatinine 1.50 (H) 0.70 - 1.30 MG/DL    BUN/Creatinine ratio 16 12 - 20      GFR est AA 56 (L) >60 ml/min/1.73m2    GFR est non-AA 46 (L) >60 ml/min/1.73m2    Calcium 8.2 (L) 8.5 - 10.1 MG/DL    Bilirubin, total 0.7 0.2 - 1.0 MG/DL    ALT (SGPT) 20 12 - 78 U/L    AST (SGOT) 18 15 - 37 U/L    Alk.  phosphatase 68 45 - 117 U/L    Protein, total 6.9 6.4 - 8.2 g/dL    Albumin 3.6 3.5 - 5.0 g/dL    Globulin 3.3 2.0 - 4.0 g/dL    A-G Ratio 1.1 1.1 - 2.2 TROPONIN I   Result Value Ref Range    Troponin-I, Qt. <0.05 <0.05 ng/mL   CK W/ REFLX CKMB   Result Value Ref Range    CK 89 39 - 308 U/L   PROCALCITONIN   Result Value Ref Range    Procalcitonin 0.1 ng/mL   OCCULT BLOOD, STOOL   Result Value Ref Range    Occult blood, stool POSITIVE (A) NEG     METABOLIC PANEL, BASIC   Result Value Ref Range    Sodium 142 136 - 145 mmol/L    Potassium 3.8 3.5 - 5.1 mmol/L    Chloride 109 (H) 97 - 108 mmol/L    CO2 26 21 - 32 mmol/L    Anion gap 7 5 - 15 mmol/L    Glucose 117 (H) 65 - 100 mg/dL    BUN 20 6 - 20 MG/DL    Creatinine 1.26 0.70 - 1.30 MG/DL    BUN/Creatinine ratio 16 12 - 20      GFR est AA >60 >60 ml/min/1.73m2    GFR est non-AA 56 (L) >60 ml/min/1.73m2    Calcium 7.9 (L) 8.5 - 10.1 MG/DL   CBC W/O DIFF   Result Value Ref Range    WBC 4.8 4.1 - 11.1 K/uL    RBC 3.26 (L) 4.10 - 5.70 M/uL    HGB 8.7 (L) 12.1 - 17.0 g/dL    HCT 27.8 (L) 36.6 - 50.3 %    MCV 85.3 80.0 - 99.0 FL    MCH 26.7 26.0 - 34.0 PG    MCHC 31.3 30.0 - 36.5 g/dL    RDW 13.2 11.5 - 14.5 %    PLATELET 206 884 - 430 K/uL    MPV 9.3 8.9 - 12.9 FL    NRBC 0.0 0  WBC    ABSOLUTE NRBC 0.00 0.00 - 0.01 K/uL   MAGNESIUM   Result Value Ref Range    Magnesium 2.1 1.6 - 2.4 mg/dL   PHOSPHORUS   Result Value Ref Range    Phosphorus 2.7 2.6 - 4.7 MG/DL   HGB & HCT   Result Value Ref Range    HGB 8.5 (L) 12.1 - 17.0 g/dL    HCT 28.0 (L) 36.6 - 50.3 %   POC H. PYLORI, TISSUE   Result Value Ref Range    H. pylori from tissue Positive Negative    Positive control Positive     Negative control Negative     Lot no. [id number]    EKG, 12 LEAD, INITIAL   Result Value Ref Range    Ventricular Rate 73 BPM    Atrial Rate 73 BPM    P-R Interval 182 ms    QRS Duration 84 ms    Q-T Interval 408 ms    QTC Calculation (Bezet) 449 ms    Calculated P Axis 56 degrees    Calculated R Axis -5 degrees    Calculated T Axis 43 degrees    Diagnosis       Sinus rhythm with occasional premature ventricular complexes  Anteroseptal infarct (cited on or before 2009)  When compared with ECG of 22-MAY-2019 15:37,  No significant change was found  Confirmed by Shwetha Gillette ([id number]) on 2019 9:33:35 AM     TYPE & SCREEN   Result Value Ref Range    Crossmatch Expiration 2019     ABO/Rh(D) A POSITIVE     Antibody screen NEG        Assessment/Plan:    ICD-10-CM ICD-9-CM    1. Acute gastric ulcer with hemorrhage K25.0 531.00 AMB POC HEMOGLOBIN (HGB)   2. 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 Coronary artery disease involving native coronary artery of native heart without angina pectoris I25.10 414.01    4. Hypercholesteremia E78.00 272.0    5. HTN (hypertension), benign I10 401.1    6. Iron deficiency anemia due to chronic blood loss D50.0 280.0      Orders Placed This Encounter    AMB POC HEMOGLOBIN (HGB)     increase physical activity, follow low fat diet, follow low salt diet, continue present plan,Take 81mg aspirin daily  Patient Instructions   [email or web address] Activation    Thank you for requesting access to [employer]. Please follow the instructions below to securely access and download your online medical record. [employer] allows you to send messages to your doctor, view your test results, renew your prescriptions, schedule appointments, and more. How Do I Sign Up? 1. In your internet browser, go to [email or web address]  2. Click on the First Time User? Click Here link in the Sign In box. You will be redirect to the New Member Sign Up page. 3. Enter your [employer] Access Code exactly as it appears below. You will not need to use this code after youve completed the sign-up process. If you do not sign up before the expiration date, you must request a new code. [employer] [facility] [employer] Access Code: Activation code not generated  Current [employer] Status: Active (This is the date your [employer] access code will )    4.  Enter the last four digits of your Social Security Number (xxxx) and Date of Birth (mm/dd/yyyy) as indicated and click Submit. You will be taken to the next sign-up page. 5. Create a View the Spacet ID. This will be your [employer] login ID and cannot be changed, so think of one that is secure and easy to remember. 6. Create a [employer] password. You can change your password at any time. 7. Enter your Password Reset Question and Answer. This can be used at a later time if you forget your password. 8. Enter your e-mail address. You will receive e-mail notification when new information is available in [address] 9. Click Sign Up. You can now view and download portions of your medical record. 10. Click the Download Summary menu link to download a portable copy of your medical information. Additional Information    If you have questions, please visit the Frequently Asked Questions section of the [employer] website at [email or web address] Remember, [employer] is NOT to be used for urgent needs. For medical emergencies, dial 911. Follow-up and Dispositions    · Return in about 2 weeks (around 8/29/2019), or if symptoms worsen or fail to improve. I have reviewed with the patient details of the assessment and plan and all questions were answered. Relevent patient education was performed. The most recent lab findings were reviewed with the patient. An After Visit Summary was printed and given to the patient.

## 2019-08-15 NOTE — PROGRESS NOTES
Met with patient at follow up visit with Dr. Drake. Introduced self and role nurse navigator. Patient verbalized understanding. Patient states his goal he will go on his cruise on 8/24/2019 if cleared by Ryan Drake and Daysi Kemp. Goals        Post Hospitalization     Knowledge and adherence of prescribed medication       8/13  Patient will hold medications (zetia, lipitor, ranexa, and norvasc) as directed while taking antibiotics. He will resume these medication when antibiotics are completed in 14 days or as directed by physician (8/26). BEB     8/15/2019   Patient continues to hold medications as directed and will follow up with Ryan Drake and Daysi Kemp per discharge instructions.  Understands red flags post discharge. 8/13  Patient will monitor for s/s bleeding and report any symptoms to provider. GI bleed educational material provided. BEB     8/15   Patient verbalized understanding sign symptoms of GIB.

## 2019-08-22 NOTE — PROGRESS NOTES
Outgoing call placed to patient reintroduced self and reason for the call given. Patient verbalized understanding and reports he did attend follow up appointment with Dr. Sarbjit Forrest on 8/20 and is traveling to [place] for his cruise. Patient reports he may not have enough of one his medication. Patient confirms [facility] as his pharmacy . Encourage patient to utilize the local [facility] to obtain the medication. Patient was not sure of which medication but will be able to identify the med once he unpacks. Will contact patient 9/4/2019. Goals        Post Hospitalization     Knowledge and adherence of prescribed medication       8/13  Patient will hold medications (zetia, lipitor, ranexa, and norvasc) as directed while taking antibiotics. He will resume these medication when antibiotics are completed in 14 days or as directed by physician (8/26). BEB     8/15/2019   Patient continues to hold medications as directed and will follow up with Ryan Brewer per discharge instructions.- [address]      8/22/2019   Patient will contact local [facility] to obtain remaining medication.  Understands red flags post discharge. 8/13  Patient will monitor for s/s bleeding and report any symptoms to provider. GI bleed educational material provided.   BEB     8/15   Patient verbalized understanding sign symptoms of GIB.      8/22   Patient denies any signs symptoms of GIB- SGP

## 2019-09-09 NOTE — PROGRESS NOTES
Outgoing call placed spoke to patient reintroduced self and reason for the call given. Patient report he will need to have follow up lab work but will need to contact Dr. Kash Santos office for the date. Patient did not voice any concerns. Will contact patient next week     Goals        Post Hospitalization     Knowledge and adherence of prescribed medication       8/13  Patient will hold medications (zetia, lipitor, ranexa, and norvasc) as directed while taking antibiotics. He will resume these medication when antibiotics are completed in 14 days or as directed by physician (8/26). BEB     8/15/2019   Patient continues to hold medications as directed and will follow up with Ryan Blandon per discharge instructions.- [address]      8/22/2019   Patient will contact local [facility] to obtain remaining medication. 9/9  Patient reports he has completed the antibiotics and resumed all of his home meds except Xeralto and Aspirin. Patient will contact Dr Kash Santos before restarting medication        Understands red flags post discharge. 8/13  Patient will monitor for s/s bleeding and report any symptoms to provider. GI bleed educational material provided.   BEB     8/15   Patient verbalized understanding sign symptoms of GIB.      8/22   Patient denies any signs symptoms of GIB- SGP    9/9   No s/s reported

## 2019-09-15 NOTE — ED NOTES
Report given to CIRO Griffith. They were informed of patient chief complaint, current status, orders completed (to include IV access/medications/radiology testing), outstanding orders that still need to be completed, and the treatment plan. Ensured no questions or concerns regarding the patient prior to departure.

## 2019-09-15 NOTE — ED NOTES
All discharge paperwork reviewed with patient and MD and he denies any need for further explanation regarding these instructions.   He is making phone calls in search for a ride home

## 2019-09-15 NOTE — DISCHARGE INSTRUCTIONS
Thank you for allowing us to take care of you today! We hope we addressed all of your concerns and needs. We strive to provide excellent quality care in the Emergency Department. You will receive a survey after your visit to evaluate the care you were provided. Should you receive a survey from us, we invite you to share your experience and tell us what made it excellent. It was a pleasure serving you, we invite you to share your experience with us, in our pursuit for excellence, should you be selected to receive a survey. The exam and treatment you received in the Emergency Department were for an urgent problem and are not intended as complete care. It is important that you follow up with a doctor, nurse practitioner, or physician assistant for ongoing care. If your symptoms become worse or you do not improve as expected and you are unable to reach your usual health care provider, you should return to the Emergency Department. We are available 24 hours a day. Please take your discharge instructions with you when you go to your follow-up appointment. If you have any problem arranging a follow-up appointment, contact the Emergency Department immediately. If a prescription has been provided, please have it filled as soon as possible to prevent a delay in treatment. Read the entire medication instruction sheet provided to you by the pharmacy. If you have any questions or reservations about taking the medication due to side effects or interactions with other medications, please call your primary care physician or contact the ER to speak with the charge nurse. Make an appointment with your family doctor or the physician you were referred to for follow-up of this visit as instructed on your discharge paperwork, as this is mandatory follow-up. Return to the ER if you are unable to be seen or if you are unable to be seen in a timely manner.     If you have any problem arranging the follow-up visit, contact the Emergency Department immediately. I hope you feel better and thank you again for allow us to provide you with excellent care today at [address] [place] [address]! Warmest regards,    Bienvenido Rodriguez MD  Emergency Medicine Physician  [address] [facility]          ]    Patient Education        Nausea and Vomiting: Care Instructions  Your Care Instructions    When you are nauseated, you may feel weak and sweaty and notice a lot of saliva in your mouth. Nausea often leads to vomiting. Most of the time you do not need to worry about nausea and vomiting, but they can be signs of other illnesses. Two common causes of nausea and vomiting are stomach flu and food poisoning. Nausea and vomiting from viral stomach flu will usually start to improve within 24 hours. Nausea and vomiting from food poisoning may last from 12 to 48 hours. The doctor has checked you carefully, but problems can develop later. If you notice any problems or new symptoms, get medical treatment right away. Follow-up care is a key part of your treatment and safety. Be sure to make and go to all appointments, and call your doctor if you are having problems. It's also a good idea to know your test results and keep a list of the medicines you take. How can you care for yourself at home? · To prevent dehydration, drink plenty of fluids, enough so that your urine is light yellow or clear like water. Choose water and other caffeine-free clear liquids until you feel better. If you have kidney, heart, or liver disease and have to limit fluids, talk with your doctor before you increase the amount of fluids you drink. · Rest in bed until you feel better. · When you are able to eat, try clear soups, mild foods, and liquids until all symptoms are gone for 12 to 48 hours. Other good choices include dry toast, crackers, cooked cereal, and gelatin dessert, such as Jell-O.   When should you call for help?  Call 911 anytime you think you may need emergency care. For example, call if:    · You passed out (lost consciousness).    Call your doctor now or seek immediate medical care if:    · You have symptoms of dehydration, such as:  ? Dry eyes and a dry mouth. ? Passing only a little dark urine. ? Feeling thirstier than usual.     · You have new or worsening belly pain.     · You have a new or higher fever.     · You vomit blood or what looks like coffee grounds.    Watch closely for changes in your health, and be sure to contact your doctor if:    · You have ongoing nausea and vomiting.     · Your vomiting is getting worse.     · Your vomiting lasts longer than 2 days.     · You are not getting better as expected. Where can you learn more? Go to http://krystle-tamia.info/. Enter 25 [id number] in the search box to learn more about \"Nausea and Vomiting: Care Instructions. \"  Current as of: September 23, 2018  Content Version: 12.1  © 1737-0737 Healthwise, Incorporated. Care instructions adapted under license by [employer] (which disclaims liability or warranty for this information). If you have questions about a medical condition or this instruction, always ask your healthcare professional. [place] [address] any warranty or liability for your use of this information.

## 2019-09-16 NOTE — PROGRESS NOTES
Shana Solano is a 68 y.o. male and presents with ED Follow-up (seen at [facility] on 9/15/19 for vomiting and had a fall.)  . 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 he states his balance has been office. Hypertension Review:  The patient has hypertension . he states that his bp has recently been low. Diet and Lifestyle: generally follows a low sodium diet, exercises sporadically  Home BP Monitoring: is not measured at home. Pertinent ROS: taking medications as instructed, no medication side effects noted, no TIA's, no chest pain on exertion, no dyspnea on exertion, no swelling of ankles. Dyslipidemia Review:  Patient presents for evaluation of lipids. Compliance with treatment thus far has been excellent. A repeat fasting lipid profile was done. The patient does not use medications that may worsen dyslipidemias . The patient exercises sporadically. Review of Systems  Constitutional: negative for fevers, chills, anorexia and weight loss  Eyes:   negative for visual disturbance and irritation  ENT:   negative for tinnitus,sore throat,nasal congestion,ear pains. hoarseness  Respiratory:  negative for cough, hemoptysis, dyspnea,wheezing  CV:   negative for chest pain, palpitations, lower extremity edema  GI:   negative for nausea, vomiting, diarrhea, abdominal pain,melena  Endo:               negative for polyuria,polydipsia,polyphagia,heat intolerance  Genitourinary: negative for frequency, dysuria and hematuria  Integument:  negative for rash and pruritus  Hematologic:  negative for easy bruising and gum/nose bleeding  Musculoskel: negative for myalgias, arthralgias, back pain, muscle weakness, joint pain  Neurological:  negative for headaches, dizziness, vertigo, memory problems and gait   Behavl/Psych: negative for feelings of anxiety, depression, mood changes    Past Medical History:   Diagnosis Date    Anxiety state, unspecified 8/27/2013    Arthritis     CAD (coronary artery disease)     Cataract cortical, senile 8/27/2013    Heart failure ([facility] [address]     MI 1993    Hypertension     Hypertensive retinopathy 8/27/2013    Observation for other specified suspected conditions 8/27/2013    [facility]    Other ill-defined conditions(799.89)     chronic back pain    Other ill-defined conditions(799.89)     AGENT ORANGE EXPOSURE,     Psychiatric disorder     PTSD, NO MEDS    Stroke ([facility] [address]     TIA R HAND, RESOLVED    Unspecified adverse effect of anesthesia     SLOW TO WAKE, AGITATED WHEN HE WAKES    Unspecified reason for consultation 8/27/2013    Health maintenance     Past Surgical History:   Procedure Laterality Date    ABDOMEN SURGERY PROC UNLISTED      x2 hernia repairs    CARDIAC SURG PROCEDURE UNLIST      stent, cardiologist Dr. Adilson Call HX ORTHOPAEDIC Right     ROTATOR CUFF X2    UPPER GI ENDOSCOPY,BIOPSY  8/12/2019          Social History     Socioeconomic History    Marital status: SINGLE     Spouse name: Not on file    Number of children: Not on file    Years of education: Not on file    Highest education level: Not on file   Tobacco Use    Smoking status: Former Smoker     Packs/day: 0.50     Years: 19.00     Pack years: 9.50    Smokeless tobacco: Former User     Quit date: 2/28/1984   Substance and Sexual Activity    Alcohol use: No    Drug use: No     Family History   Problem Relation Age of Onset    Cancer Mother     Heart Attack Mother     Cancer Father     Anesth Problems Neg Hx      Current Outpatient Medications   Medication Sig Dispense Refill    amLODIPine (NORVASC) 5 mg tablet Take 1 Tab by mouth daily. Hold norvasc for 2 weeks while taking antibiotics 30 Tab 12    ondansetron (ZOFRAN ODT) 4 mg disintegrating tablet Take 1 Tab by mouth every eight (8) hours as needed for Nausea. 20 Tab 0    famotidine (PEPCID) 20 mg tablet Take 1 Tab by mouth two (2) times a day for 10 days.  20 Tab 0    ezetimibe (ZETIA) 10 mg tablet Take 1 Tab by mouth daily. Hold for 2 weeks while taking antibiotics 30 Tab 0    atorvastatin (LIPITOR) 40 mg tablet Take 1 Tab by mouth nightly. Hold for 2 weeks while taking antibioticx 30 Tab 0    ranolazine ER (RANEXA) 500 mg SR tablet Take 1 Tab by mouth two (2) times a day. Hold ranexa for two weeks while taking antibiotics 30 Tab 0    pantoprazole (PROTONIX) 40 mg tablet Take 1 Tab by mouth Before breakfast and dinner. Take 1 tablet twice a day x 2 weeks and then daily there after 28 Tab 4    olmesartan-hydroCHLOROthiazide (BENICAR HCT) 40-12.5 mg per tablet TK 1 T PO ONCE D FOR HYPERTENSION  5    fluticasone propionate (FLONASE) 50 mcg/actuation nasal spray 1 [place] by Both Nostrils route two (2) times daily as needed for Rhinitis.  carvedilol (COREG CR) 80 mg CR capsule Take 80 mg by mouth daily (with breakfast). 5    potassium chloride (K-DUR, KLOR-CON) 20 mEq tablet Take 20 mEq by mouth daily. 5    amoxicillin (AMOXIL) 500 mg capsule Take 2 Caps by mouth every twelve (12) hours. 28 Cap 0    clarithromycin (BIAXIN) 500 mg tablet Take 1 Tab by mouth two (2) times a day.  28 Tab 0     Allergies   Allergen Reactions    Darvocet A500 [Propoxyphene N-Acetaminophen] Other (comments)     floaty/high feeling       Objective:  Visit Vitals  /78 (BP 1 Location: Left arm, BP Patient Position: Sitting)   Pulse 69   Temp 97.7 °F (36.5 °C) (Oral)   Resp 16   Ht 5' 9\" (1.753 m)   Wt 178 lb 12.8 oz (81.1 kg)   SpO2 95%   BMI 26.40 kg/m²     Physical Exam:   General appearance - alert, well appearing, and in no distress  Mental status - alert, oriented to person, place, and time  EYE-JARED, EOMI, corneas normal, no foreign bodies  ENT-ENT exam normal, no neck nodes or sinus tenderness  Nose - normal and patent, no erythema, discharge or polyps  Mouth - mucous membranes moist, pharynx normal without lesions  Neck - supple, no significant adenopathy   Chest - clear to auscultation, no wheezes, rales or rhonchi, symmetric air entry   Heart - normal rate, regular rhythm, normal S1, S2, no murmurs, rubs, clicks or gallops   Abdomen - soft, nontender, nondistended, no masses or organomegaly  Lymph- no adenopathy palpable  Ext-peripheral pulses normal, no pedal edema, no clubbing or cyanosis  Skin-Warm and dry. no hyperpigmentation, vitiligo, or suspicious lesions  Neuro -alert, oriented, normal speech, no focal findings or movement disorder noted  Neck-normal C-spine, no tenderness, full ROM without pain  Feet-no nail deformities or callus formation with good pulses noted      Results for orders placed or performed during the hospital encounter of 09/15/19   CBC WITH AUTOMATED DIFF   Result Value Ref Range    WBC 5.8 4.1 - 11.1 K/uL    RBC 4.09 (L) 4.10 - 5.70 M/uL    HGB 9.6 (L) 12.1 - 17.0 g/dL    HCT 32.2 (L) 36.6 - 50.3 %    MCV 78.7 (L) 80.0 - 99.0 FL    MCH 23.5 (L) 26.0 - 34.0 PG    MCHC 29.8 (L) 30.0 - 36.5 g/dL    RDW 15.1 (H) 11.5 - 14.5 %    PLATELET 615 081 - 319 K/uL    MPV 10.1 8.9 - 12.9 FL    NRBC 0.0 0  WBC    ABSOLUTE NRBC 0.00 0.00 - 0.01 K/uL    NEUTROPHILS 63 32 - 75 %    LYMPHOCYTES 22 12 - 49 %    MONOCYTES 11 5 - 13 %    EOSINOPHILS 3 0 - 7 %    BASOPHILS 1 0 - 1 %    IMMATURE GRANULOCYTES 0 0.0 - 0.5 %    ABS. NEUTROPHILS 3.7 1.8 - 8.0 K/UL    ABS. LYMPHOCYTES 1.3 0.8 - 3.5 K/UL    ABS. MONOCYTES 0.6 0.0 - 1.0 K/UL    ABS. EOSINOPHILS 0.2 0.0 - 0.4 K/UL    ABS. BASOPHILS 0.1 0.0 - 0.1 K/UL    ABS. IMM.  GRANS. 0.0 0.00 - 0.04 K/UL    DF AUTOMATED     METABOLIC PANEL, COMPREHENSIVE   Result Value Ref Range    Sodium 139 136 - 145 mmol/L    Potassium 3.8 3.5 - 5.1 mmol/L    Chloride 106 97 - 108 mmol/L    CO2 28 21 - 32 mmol/L    Anion gap 5 5 - 15 mmol/L    Glucose 116 (H) 65 - 100 mg/dL    BUN 23 (H) 6 - 20 MG/DL    Creatinine 1.46 (H) 0.70 - 1.30 MG/DL    BUN/Creatinine ratio 16 12 - 20      GFR est AA 58 (L) >60 ml/min/1.73m2    GFR est non-AA 47 (L) >60 ml/min/1.73m2    Calcium 8.4 (L) 8.5 - 10.1 MG/DL    Bilirubin, total 0.9 0.2 - 1.0 MG/DL    ALT (SGPT) 14 12 - 78 U/L    AST (SGOT) 12 (L) 15 - 37 U/L    Alk. phosphatase 70 45 - 117 U/L    Protein, total 7.0 6.4 - 8.2 g/dL    Albumin 3.6 3.5 - 5.0 g/dL    Globulin 3.4 2.0 - 4.0 g/dL    A-G Ratio 1.1 1.1 - 2.2     LIPASE   Result Value Ref Range    Lipase 84 73 - 393 U/L   CK W/ REFLX CKMB   Result Value Ref Range    CK 69 39 - 308 U/L   TROPONIN I   Result Value Ref Range    Troponin-I, Qt. <0.05 <0.05 ng/mL   EKG, 12 LEAD, INITIAL   Result Value Ref Range    Ventricular Rate 59 BPM    Atrial Rate 59 BPM    P-R Interval 194 ms    QRS Duration 84 ms    Q-T Interval 466 ms    QTC Calculation (Bezet) 461 ms    Calculated P Axis 58 degrees    Calculated R Axis -13 degrees    Calculated T Axis 63 degrees    Diagnosis       Sinus bradycardia with occasional premature ventricular complexes  Low voltage QRS  Cannot rule out Anteroseptal infarct (cited on or before 18-FEB-2009)  When compared with ECG of 11-AUG-2019 17:38,  T wave inversion no longer evident in Anterior leads  Confirmed by Wilda Choudhury ([id number]) on 9/15/2019 7:36:24 PM         Assessment/Plan:    ICD-10-CM ICD-9-CM    1. HTN (hypertension), benign I10 401.1    2. New infarction of cerebellum ([facility]) I63.9 434.91    3. Coronary artery disease involving native coronary artery of native heart without angina pectoris I25.10 414.01    4. Hypercholesterolemia E78.00 272.0      Orders Placed This Encounter    amLODIPine (NORVASC) 5 mg tablet     Sig: Take 1 Tab by mouth daily. Hold norvasc for 2 weeks while taking antibiotics     Dispense:  30 Tab     Refill:  12     increase physical activity, follow low fat diet, follow low salt diet, continue present plan,Take 81mg aspirin daily  Patient Instructions   Ewirelessgear Activation    Thank you for requesting access to E[employer]lessgear. Please follow the instructions below to securely access and download your online medical record.  Ewirelessgear allows you to send messages to your doctor, view your test results, renew your prescriptions, schedule appointments, and more. How Do I Sign Up? 1. In your internet browser, go to [email or web address]. [email or web address]  2. Click on the First Time User? Click Here link in the Sign In box. You will be redirect to the New Member Sign Up page. 3. Enter your GoTable Access Code exactly as it appears below. You will not need to use this code after youve completed the sign-up process. If you do not sign up before the expiration date, you must request a new code. Data Campt Access Code: Activation code not generated  Current GoTable Status: Active (This is the date your GoTable access code will )    4. Enter the last four digits of your Social Security Number (xxxx) and Date of Birth (mm/dd/yyyy) as indicated and click Submit. You will be taken to the next sign-up page. 5. Create a GoTable ID. This will be your GoTable login ID and cannot be changed, so think of one that is secure and easy to remember. 6. Create a GoTable password. You can change your password at any time. 7. Enter your Password Reset Question and Answer. This can be used at a later time if you forget your password. 8. Enter your e-mail address. You will receive e-mail notification when new information is available in [address] 9. Click Sign Up. You can now view and download portions of your medical record. 10. Click the Download Summary menu link to download a portable copy of your medical information. Additional Information    If you have questions, please visit the Frequently Asked Questions section of the GoTable website at [email or web address] [email or web address]. [email or web address] Remember, GoTable is NOT to be used for urgent needs. For medical emergencies, dial 911. Follow-up and Dispositions    · Return in about 4 weeks (around 10/14/2019), or if symptoms worsen or fail to improve.            I have reviewed with the patient details of the assessment and plan and all questions were answered. Relevent patient education was performed. The most recent lab findings were reviewed with the patient. An After Visit Summary was printed and given to the patient.

## 2019-09-16 NOTE — PROGRESS NOTES
Huseyin Cao is a 68 y.o. male     Chief Complaint   Patient presents with    ED Follow-up     seen at [address] [place] [address] on 9/15/19 for vomiting and had a fall. Visit Vitals  /78 (BP 1 Location: Left arm, BP Patient Position: Sitting)   Pulse 69   Temp 97.7 °F (36.5 °C) (Oral)   Resp 16   Ht 5' 9\" (1.753 m)   Wt 178 lb 12.8 oz (81.1 kg)   SpO2 95%   BMI 26.40 kg/m²       Health Maintenance Due   Topic Date Due    Shingrix Vaccine Age 49> (1 of 2) 11/28/1996    Pneumococcal 65+ years (2 of 2 - PCV13) 02/07/2013    GLAUCOMA SCREENING Q2Y  08/14/2017    Influenza Age 9 to Adult  08/01/2019    MEDICARE YEARLY EXAM  08/28/2019       1. Have you been to the ER, urgent care clinic since your last visit? Hospitalized since your last visit? Yes seen at [id number] [place] [address] on 9/15/19 for vomiting and had a fall. 2. Have you seen or consulted any other health care providers outside of the [address] [place] [address] since your last visit? Include any pap smears or colon screening.  No

## 2019-09-16 NOTE — PATIENT INSTRUCTIONS
[employer] Activation    Thank you for requesting access to Wildcard. Please follow the instructions below to securely access and download your online medical record. Wildcard allows you to send messages to your doctor, view your test results, renew your prescriptions, schedule appointments, and more. How Do I Sign Up? 1. In your internet browser, go to [email or web address]  2. Click on the First Time User? Click Here link in the Sign In box. You will be redirect to the New Member Sign Up page. 3. Enter your Wildcard Access Code exactly as it appears below. You will not need to use this code after youve completed the sign-up process. If you do not sign up before the expiration date, you must request a new code. Wildcard Access Code: Activation code not generated  Current Wildcard Status: Active (This is the date your Wildcard access code will )    4. Enter the last four digits of your Social Security Number (xxxx) and Date of Birth (mm/dd/yyyy) as indicated and click Submit. You will be taken to the next sign-up page. 5. Create a Wildcard ID. This will be your Wildcard login ID and cannot be changed, so think of one that is secure and easy to remember. 6. Create a Wildcard password. You can change your password at any time. 7. Enter your Password Reset Question and Answer. This can be used at a later time if you forget your password. 8. Enter your e-mail address. You will receive e-mail notification when new information is available in [address] 9. Click Sign Up. You can now view and download portions of your medical record. 10. Click the Download Summary menu link to download a portable copy of your medical information. Additional Information    If you have questions, please visit the Frequently Asked Questions section of the Wildcard website at [email or web address] Remember, Wildcard is NOT to be used for urgent needs. For medical emergencies, dial 911.

## 2019-09-18 NOTE — PROGRESS NOTES
Per patient's EMR, patient was seen at [facility] 9/15 and have follow up appointment with Dr. Sandeep Lopez 9/18/2019 c/o nausea vomiting and fall. Outgoing call placed spoke to patient reintroduced self and reason for the call. Patient reports he is doing better and states he may have moved too quickly getting out of the bed. Encourage patient to sit on the edge of bed a few minutes prior to get up also would be a good time to check blood pressure. Patient verbalized agreement. Goals Post Hospitalization  Knowledge and adherence of prescribed medication 8/13 Patient will hold medications (zetia, lipitor, ranexa, and norvasc) as directed while taking antibiotics. He will resume these medication when antibiotics are completed in 14 days or as directed by physician (8/26). BEB  
 
8/15/2019 Patient continues to hold medications as directed and will follow up with Ryan Matos per discharge instructions.- SGP   
 
8/22/2019 Patient will contact local [facility] pharmacy to obtain remaining medication. 9/9 Patient reports he has completed the antibiotics and resumed all of his home meds except Xeralto and Aspirin. Patient will contact Dr Ana Mckeon before restarting medication 9/18 Xeralto and Aspirin not resumed, patient prescribed Zofran prn nausea and Pepcid bid times 10 days.  Understands red flags post discharge. 8/13 Patient will monitor for s/s bleeding and report any symptoms to provider. GI bleed educational material provided. BEB  
 
8/15 Patient verbalized understanding sign symptoms of GIB.   
 
8/22 Patient denies any signs symptoms of GIB- SGP 
 
9/9 No s/s reported-SGP 
 
9/18 Patient will sit on edge of bed and record blood pressure prior to getting up. -SGP

## 2019-09-30 NOTE — PROGRESS NOTES
Outgoing call placed spoke to patient reintroduced self, patient stated he was doing okay and this was not a good time to speak. Requested to contact this writer at a later time.

## 2019-10-29 NOTE — Clinical Note
I spoke to patient for update, he will be coming off the bundle 11/12. The only concern he had was out Protonix. Encourage patient to contact gi Dr. Jyotsna Shepherd to reorder.  The prescription was originally ordered by the hospitalist.

## 2019-10-29 NOTE — PROGRESS NOTES
Outgoing call placed spoke to patient reintroduced self and reason for call. Patient verbalized understanding. Goals Post Hospitalization  Knowledge and adherence of prescribed medication 10/29 Patient reports he is taking medication as prescribed but will need a refill for Protonix. Patient encourage to reach out to Dr. Jyotsna Shepherd (GI) to have prescription refilled. -SGP 
 
8/13 Patient will hold medications (zetia, lipitor, ranexa, and norvasc) as directed while taking antibiotics. He will resume these medication when antibiotics are completed in 14 days or as directed by physician (8/26). BEB  
 
8/15/2019 Patient continues to hold medications as directed and will follow up with [facility] per discharge instructions.- SGP   
 
8/22/2019 Patient will contact local [facility] pharmacy to obtain remaining medication. 9/9 Patient reports he has completed the antibiotics and resumed all of his home meds except Xeralto and Aspirin. Patient will contact Dr Jyotsna Shepherd before restarting medication 9/18 Xeralto and Aspirin not resumed, patient prescribed Zofran prn nausea and Pepcid bid times 10 days.  Understands red flags post discharge. 10/29 Patient reports no signs symptoms of GIB-SGP 
 
8/13 Patient will monitor for s/s bleeding and report any symptoms to provider. GI bleed educational material provided. BEB  
 
8/15 Patient verbalized understanding sign symptoms of GIB.   
 
8/22 Patient denies any signs symptoms of GIB- SGP 
 
9/9 No s/s reported-SGP 
 
9/18 Patient will sit on edge of bed and record blood pressure prior to getting up. -SGP

## 2019-11-08 ENCOUNTER — PATIENT OUTREACH (OUTPATIENT)
Dept: INTERNAL MEDICINE CLINIC | Age: 74
End: 2019-11-08

## 2020-03-16 NOTE — ED PROVIDER NOTES
EMERGENCY DEPARTMENT HISTORY AND PHYSICAL EXAM      Date: 9/15/2019  Patient Name: Kash Davis  Patient Age and Sex: 68 y.o. male    History of Presenting Illness     Chief Complaint   Patient presents with    Vomiting     Woke up with nausea and vomiting about 0545. He got lightheaded and fell on the floor. Patient denies any abdominal pain or loss of consciousness. Called EMS. History Provided By: Patient and EMS    HPI: Kash Davis, 68 y.o. male with past medical history as documented below presents to the ED with c/o of acute episode of vomiting around 5:45 AM after going to the bathroom. Patient states that he woke up this morning to go use the bathroom and felt very lightheaded. Patient did not have any syncope or loss of consciousness. Patient states that he fell on the back. He has chronic back pain and he denies any worsening of his chronic back pain. He currently reports nausea but no emesis. Per chart review, patient did have an admission back in August for acute blood loss anemia secondary to gastric ulcer with gastritis. Patient also has significant history of CAD with stents as well as CVA; he has a history of heart failure with EF of 35%, CKD, stage III. Patient also has history of stroke back in May 2019 which was cerebellar. Patient denies any dark stools, hematemesis, chest pain or SOB. Pt denies any other alleviating or exacerbating factors. Additionally, pt specifically denies any recent fever, chills, headache, abdominal pain, numbness, weakness, BLE swelling, heart palpitations, urinary sxs, diarrhea, constipation, melena, hematochezia, cough, or congestion. There are no other complaints, changes or physical findings at this time.      PCP: Karis Muhammad MD    Past History   Past Medical History:  Past Medical History:   Diagnosis Date    Anxiety state, unspecified 8/27/2013    Arthritis     CAD (coronary artery disease)     Cataract cortical, senile FS,   Patient requesting lab results  Vitamin D is the only one remaining  Vitamin D low  Other results normal   Please advise  Thanks,  Justyna RAMIREZ RN     8/27/2013    Heart failure ([facility] [address]     MI 1993    Hypertension     Hypertensive retinopathy 8/27/2013    Observation for other specified suspected conditions 8/27/2013    [facility]    Other ill-defined conditions(799.89)     chronic back pain    Other ill-defined conditions(799.89)     AGENT ORANGE EXPOSURE,     Psychiatric disorder     PTSD, NO MEDS    Stroke ([facility] [address]     TIA R HAND, RESOLVED    Unspecified adverse effect of anesthesia     SLOW TO WAKE, AGITATED WHEN HE WAKES    Unspecified reason for consultation 8/27/2013    Health maintenance       Past Surgical History:  Past Surgical History:   Procedure Laterality Date    ABDOMEN SURGERY PROC UNLISTED      x2 hernia repairs    CARDIAC SURG PROCEDURE UNLIST      stent, cardiologist Dr. Shelby Gunter HX ORTHOPAEDIC Right     ROTATOR CUFF X2    UPPER GI ENDOSCOPY,BIOPSY  8/12/2019            Family History:  Family History   Problem Relation Age of Onset    Cancer Mother     Heart Attack Mother     Cancer Father     Anesth Problems Neg Hx        Social History:  Social History     Tobacco Use    Smoking status: Former Smoker     Packs/day: 0.50     Years: 19.00     Pack years: 9.50    Smokeless tobacco: Former User     Quit date: 2/28/1984   Substance Use Topics    Alcohol use: No    Drug use: No       Allergies: Allergies   Allergen Reactions    Darvocet A500 [Propoxyphene N-Acetaminophen] Other (comments)     floaty/high feeling       Current Medications:  No current facility-administered medications on file prior to encounter. Current Outpatient Medications on File Prior to Encounter   Medication Sig Dispense Refill    amoxicillin (AMOXIL) 500 mg capsule Take 2 Caps by mouth every twelve (12) hours. 28 Cap 0    clarithromycin (BIAXIN) 500 mg tablet Take 1 Tab by mouth two (2) times a day. 28 Tab 0    ezetimibe (ZETIA) 10 mg tablet Take 1 Tab by mouth daily.  Hold for 2 weeks while taking antibiotics 30 Tab 0    atorvastatin (LIPITOR) 40 mg tablet Take 1 Tab by mouth nightly. Hold for 2 weeks while taking antibioticx 30 Tab 0    ranolazine ER (RANEXA) 500 mg SR tablet Take 1 Tab by mouth two (2) times a day. Hold ranexa for two weeks while taking antibiotics 30 Tab 0    amLODIPine (NORVASC) 10 mg tablet Take 1 Tab by mouth daily. Hold norvasc for 2 weeks while taking antibiotics 30 Tab 0    pantoprazole (PROTONIX) 40 mg tablet Take 1 Tab by mouth Before breakfast and dinner. Take 1 tablet twice a day x 2 weeks and then daily there after 28 Tab 4    olmesartan-hydroCHLOROthiazide (BENICAR HCT) 40-12.5 mg per tablet TK 1 T PO ONCE D FOR HYPERTENSION  5    fluticasone propionate (FLONASE) 50 mcg/actuation nasal spray 1 [place] by Both Nostrils route two (2) times daily as needed for Rhinitis.  carvedilol (COREG CR) 80 mg CR capsule Take 80 mg by mouth daily (with breakfast). 5    potassium chloride (K-DUR, KLOR-CON) 20 mEq tablet Take 20 mEq by mouth daily. 5       Review of Systems   Review of Systems   Constitutional: Negative. Negative for chills and fever. HENT: Negative. Negative for congestion, facial swelling, rhinorrhea, sore throat, trouble swallowing and voice change. Eyes: Negative. Respiratory: Negative. Negative for apnea, cough, chest tightness, shortness of breath and wheezing. Cardiovascular: Negative. Negative for chest pain, palpitations and leg swelling. Gastrointestinal: Positive for nausea and vomiting. Negative for abdominal distention, abdominal pain, blood in stool, constipation and diarrhea. Endocrine: Negative. Negative for cold intolerance, heat intolerance and polyuria. Genitourinary: Negative. Negative for difficulty urinating, dysuria, flank pain, frequency, hematuria and urgency. Musculoskeletal: Negative. Negative for arthralgias, back pain, myalgias, neck pain and neck stiffness. Skin: Negative. Negative for color change and rash.    Neurological: Positive for dizziness and light-headedness. Negative for syncope, facial asymmetry, speech difficulty, weakness, numbness and headaches. Hematological: Negative. Does not bruise/bleed easily. Psychiatric/Behavioral: Negative. Negative for confusion and self-injury. The patient is not nervous/anxious. Physical Exam   Physical Exam   Constitutional: He is oriented to person, place, and time. Vital signs are normal. He appears well-developed and well-nourished. He is cooperative. Non-toxic appearance. HENT:   Head: Normocephalic and atraumatic. Mouth/Throat: Mucous membranes are normal. No posterior oropharyngeal erythema. Eyes: Pupils are equal, round, and reactive to light. Conjunctivae and EOM are normal.   Neck: Normal range of motion. Cardiovascular: Normal rate, regular rhythm, normal heart sounds and intact distal pulses. Exam reveals no gallop and no friction rub. No murmur heard. Pulmonary/Chest: Effort normal and breath sounds normal. No respiratory distress. He has no wheezes. He has no rales. He exhibits no tenderness. Abdominal: Soft. Bowel sounds are normal. He exhibits no distension and no mass. There is no tenderness. There is no rebound and no guarding. Musculoskeletal: Normal range of motion. He exhibits no edema, tenderness or deformity. Neurological: He is alert and oriented to person, place, and time. He displays normal reflexes. No cranial nerve deficit. He exhibits normal muscle tone. Coordination normal.   Skin: Skin is warm. No rash noted. Psychiatric: He has a normal mood and affect. Nursing note and vitals reviewed.       Diagnostic Study Results     Labs -  Recent Results (from the past 24 hour(s))   EKG, 12 LEAD, INITIAL    Collection Time: 09/15/19  7:01 AM   Result Value Ref Range    Ventricular Rate 59 BPM    Atrial Rate 59 BPM    P-R Interval 194 ms    QRS Duration 84 ms    Q-T Interval 466 ms    QTC Calculation (Bezet) 461 ms    Calculated P Axis 58 degrees Calculated R Axis -13 degrees    Calculated T Axis 63 degrees    Diagnosis       Sinus bradycardia with occasional premature ventricular complexes  Low voltage QRS  Cannot rule out Anteroseptal infarct (cited on or before 18-FEB-2009)  When compared with ECG of 11-AUG-2019 17:38,  T wave inversion no longer evident in Anterior leads  Confirmed by Kandice Weinstein ([id number]) on 9/15/2019 7:36:24 PM     CBC WITH AUTOMATED DIFF    Collection Time: 09/15/19  7:35 AM   Result Value Ref Range    WBC 5.8 4.1 - 11.1 K/uL    RBC 4.09 (L) 4.10 - 5.70 M/uL    HGB 9.6 (L) 12.1 - 17.0 g/dL    HCT 32.2 (L) 36.6 - 50.3 %    MCV 78.7 (L) 80.0 - 99.0 FL    MCH 23.5 (L) 26.0 - 34.0 PG    MCHC 29.8 (L) 30.0 - 36.5 g/dL    RDW 15.1 (H) 11.5 - 14.5 %    PLATELET 158 528 - 565 K/uL    MPV 10.1 8.9 - 12.9 FL    NRBC 0.0 0  WBC    ABSOLUTE NRBC 0.00 0.00 - 0.01 K/uL    NEUTROPHILS 63 32 - 75 %    LYMPHOCYTES 22 12 - 49 %    MONOCYTES 11 5 - 13 %    EOSINOPHILS 3 0 - 7 %    BASOPHILS 1 0 - 1 %    IMMATURE GRANULOCYTES 0 0.0 - 0.5 %    ABS. NEUTROPHILS 3.7 1.8 - 8.0 K/UL    ABS. LYMPHOCYTES 1.3 0.8 - 3.5 K/UL    ABS. MONOCYTES 0.6 0.0 - 1.0 K/UL    ABS. EOSINOPHILS 0.2 0.0 - 0.4 K/UL    ABS. BASOPHILS 0.1 0.0 - 0.1 K/UL    ABS. IMM. GRANS. 0.0 0.00 - 0.04 K/UL    DF AUTOMATED     METABOLIC PANEL, COMPREHENSIVE    Collection Time: 09/15/19  7:35 AM   Result Value Ref Range    Sodium 139 136 - 145 mmol/L    Potassium 3.8 3.5 - 5.1 mmol/L    Chloride 106 97 - 108 mmol/L    CO2 28 21 - 32 mmol/L    Anion gap 5 5 - 15 mmol/L    Glucose 116 (H) 65 - 100 mg/dL    BUN 23 (H) 6 - 20 MG/DL    Creatinine 1.46 (H) 0.70 - 1.30 MG/DL    BUN/Creatinine ratio 16 12 - 20      GFR est AA 58 (L) >60 ml/min/1.73m2    GFR est non-AA 47 (L) >60 ml/min/1.73m2    Calcium 8.4 (L) 8.5 - 10.1 MG/DL    Bilirubin, total 0.9 0.2 - 1.0 MG/DL    ALT (SGPT) 14 12 - 78 U/L    AST (SGOT) 12 (L) 15 - 37 U/L    Alk.  phosphatase 70 45 - 117 U/L    Protein, total 7.0 6.4 - 8.2 g/dL    Albumin 3.6 3.5 - 5.0 g/dL    Globulin 3.4 2.0 - 4.0 g/dL    A-G Ratio 1.1 1.1 - 2.2     LIPASE    Collection Time: 09/15/19  7:35 AM   Result Value Ref Range    Lipase 84 73 - 393 U/L   CK W/ REFLX CKMB    Collection Time: 09/15/19  7:35 AM   Result Value Ref Range    CK 69 39 - 308 U/L   TROPONIN I    Collection Time: 09/15/19  7:35 AM   Result Value Ref Range    Troponin-I, Qt. <0.05 <0.05 ng/mL       Radiologic Studies -   XR CHEST PA LAT   Final Result   IMPRESSION:    Bibasilar atelectasis or interstitial prominence. .  . CT HEAD WO CONT   Final Result   IMPRESSION:    1. No acute intracranial abnormality is confirmed. 2. Stable old ischemic change in the right parietal lobe. 3. The cerebellar vermis in infarction described in May 2019 is not conspicuous   by CT. CT Results  (Last 48 hours)               09/15/19 0713  CT HEAD WO CONT Final result    Impression:  IMPRESSION:    1. No acute intracranial abnormality is confirmed. 2. Stable old ischemic change in the right parietal lobe. 3. The cerebellar vermis in infarction described in May 2019 is not conspicuous   by CT. Narrative:  EXAM: CT HEAD WO CONT       INDICATION: stroke 5/2019 cerebellar, h/o vomiting. Woke up today with nausea   and vomiting at 0545 hours. Lightheadedness and syncope, falling on the floor. COMPARISON: None. CONTRAST: None. TECHNIQUE: Unenhanced CT of the head was performed using 5 mm images. Brain and   bone windows were generated. CT dose reduction was achieved through use of a   standardized protocol tailored for this examination and automatic exposure   control for dose modulation. FINDINGS:   The ventricles and sulci are normal in size, shape and configuration and   midline. Periventricular white matter low-density is mild and stable, with   stable encephalomalacia in the right parietal lobe where there has been old   infarction.  The cerebellar vermis and infarction described in nature 2019 is   difficult to visualize on CT. Leticia Agrawal There is no intracranial hemorrhage, extra-axial   collection, mass, mass effect or midline shift. The basilar cisterns are open. No acute infarct is identified. The bone windows demonstrate no abnormalities. The visualized portions of the paranasal sinuses and mastoid air cells are   clear. CXR Results  (Last 48 hours)               09/15/19 0728  XR CHEST PA LAT Final result    Impression:  IMPRESSION:    Bibasilar atelectasis or interstitial prominence. .  . Narrative:  INDICATION:  n/v. EXAM: 2 VIEW CHEST RADIOGRAPH. COMPARISON: 8/11/2019. FINDINGS: Frontal and lateral views of the chest show mild bibasilar   interstitial prominence and/or atelectasis. . The heart, mediastinum and   pulmonary vasculature are stable with cardiomegaly. The bony thorax is   unremarkable for age. ..                 Medical Decision Making   I am the first provider for this patient. I reviewed the vital signs, available nursing notes, past medical history, past surgical history, family history and social history. Vital Signs-Reviewed the patient's vital signs. Patient Vitals for the past 24 hrs:   Temp Pulse Resp BP SpO2   09/15/19 0845  64  116/70 99 %   09/15/19 0830  (!) 57  115/69 100 %   09/15/19 0815  69  109/63 100 %   09/15/19 0800  (!) 58  114/67    09/15/19 0745  (!) 58  115/71 98 %   09/15/19 0736    118/70 96 %   09/15/19 0700 97.4 °F (36.3 °C) 67 16 121/69 99 %       Pulse Oximetry Analysis - 99% on RA    Cardiac Monitor:   Rate: 58 bpm  Rhythm: Normal Sinus Rhythm      ED EKG interpretation:  Rhythm: sinus bradycardia and PVC's; and regular . Rate (approx.): 59; Axis: normal; P wave: normal; QRS interval: normal ; ST/T wave: normal; Other findings: normal. This EKG was interpreted by Audrey Allison M.D.     Records Reviewed: Nursing Notes, Old Medical Records, Previous electrocardiograms, Previous Radiology Studies and Previous Laboratory Studies    Provider Notes (Medical Decision Making):   Patient presents with acute nausea and vomiting and dizziness; h/o CVA but currently non-focal exam. Pt is afebrile with stable vitals; exam is non peritoneal. Differential includes gastritis/GERD, electrolyte disturbance, NACHO, pancreatitis cholelithiasis, cholecystitis, hepatitis, renal pathology, ACS, gastroenteritis, infection such as UTI/PNA. Will obtain labs and possibly imaging and EKG PRN. Will treat symptomatically and reassess. ED Course:   Initial assessment performed. The patients presenting problems have been discussed, and they are in agreement with the care plan formulated and outlined with them. I have encouraged them to ask questions as they arise throughout their visit. HYPERTENSION COUNSELING   Education was provided to the patient today regarding their hypertension. Patient is made aware of their elevated blood pressure and is instructed to follow up this week with their Primary Care for a recheck. Patient is counseled regarding consequences of chronic, uncontrolled hypertension including kidney disease, heart disease, stroke or even death. Patient states their understanding and agrees to follow up this week. Additionally, during their visit, I discussed sodium restriction, maintaining ideal body weight and regular exercise program as physiologic means to achieve blood pressure control. The patient will strive towards this. I reviewed our electronic medical record system for any past medical records that were available that may contribute to the patient's current condition, the nursing notes and vital signs from today's visit.   Erum Ghotra MD    ED Orders Placed :  Orders Placed This Encounter    XR CHEST PA LAT    CT HEAD WO CONT    CBC WITH AUTOMATED DIFF    METABOLIC PANEL, COMPREHENSIVE    LIPASE    CK W/ REFLX CKMB    TROPONIN I    EKG 12 LEAD INITIAL  ondansetron (ZOFRAN) injection 4 mg    famotidine (PF) (PEPCID) 20 mg in sodium chloride 0.9% 10 mL injection    ondansetron (ZOFRAN ODT) 4 mg disintegrating tablet    famotidine (PEPCID) 20 mg tablet     ED Medications Administered:  Medications   ondansetron (ZOFRAN) injection 4 mg (4 mg IntraVENous Given 9/15/19 4052)   famotidine (PF) (PEPCID) 20 mg in sodium chloride 0.9% 10 mL injection (20 mg IntraVENous Given 9/15/19 3174)     Progress Note:  Pt tolerating PO, able to ambulate around ED without gait disturbances, dizziness or other sx's. Progress Note:  Patient has been reassessed and reports feeling better and symptoms have improved significantly after ED treatment. Patient feels comfortable going home with close follow-up. Jodi Rouse's final labs and imaging have been reviewed with him and available family and/or caregiver. They have been counseled regarding his diagnosis. He verbally conveys understanding and agreement of the signs, symptoms, diagnosis, treatment and prognosis and additionally agrees to follow up as recommended with Dr. Gerardo Turner MD and/or specialist in 24 - 48 hours. He also agrees with the care-plan we created together and conveys that all of his questions have been answered. I have also put together some discharge instructions for him that include: 1) educational information regarding their diagnosis, 2) how to care for their diagnosis at home, as well a 3) list of reasons why they would want to return to the ED prior to their follow-up appointment should the patient's condition change or symptoms worsen. I have answered all questions to the patient's satisfaction. Strict return precautions given. He both understood and agreed with plan as discussed. Vital signs stable for discharge. Disposition: DISCHARGE  The pt is ready for discharge.  The pt's signs, symptoms, diagnosis, and discharge instructions have been discussed and pt has conveyed their understanding. The pt is to follow up as recommended or return to ER should their symptoms worsen. Plan has been discussed and pt is in agreement. PLAN:  1. Discharge Medication List as of 9/15/2019  8:32 AM      START taking these medications    Details   ondansetron (ZOFRAN ODT) 4 mg disintegrating tablet Take 1 Tab by mouth every eight (8) hours as needed for Nausea. , Print, Disp-20 Tab, R-0      famotidine (PEPCID) 20 mg tablet Take 1 Tab by mouth two (2) times a day for 10 days. , Print, Disp-20 Tab, R-0         CONTINUE these medications which have NOT CHANGED    Details   amoxicillin (AMOXIL) 500 mg capsule Take 2 Caps by mouth every twelve (12) hours. , Print, Disp-28 Cap, R-0      clarithromycin (BIAXIN) 500 mg tablet Take 1 Tab by mouth two (2) times a day., Print, Disp-28 Tab, R-0      ezetimibe (ZETIA) 10 mg tablet Take 1 Tab by mouth daily. Hold for 2 weeks while taking antibiotics, No Print, Disp-30 Tab, R-0      atorvastatin (LIPITOR) 40 mg tablet Take 1 Tab by mouth nightly. Hold for 2 weeks while taking antibioticx, No Print, Disp-30 Tab, R-0      ranolazine ER (RANEXA) 500 mg SR tablet Take 1 Tab by mouth two (2) times a day. Hold ranexa for two weeks while taking antibiotics, Normal, Disp-30 Tab, R-0      amLODIPine (NORVASC) 10 mg tablet Take 1 Tab by mouth daily. Hold norvasc for 2 weeks while taking antibiotics, No Print, Disp-30 Tab, R-0      pantoprazole (PROTONIX) 40 mg tablet Take 1 Tab by mouth Before breakfast and dinner. Take 1 tablet twice a day x 2 weeks and then daily there after, Print, Disp-28 Tab, R-4      olmesartan-hydroCHLOROthiazide (BENICAR HCT) 40-12.5 mg per tablet TK 1 T PO ONCE D FOR HYPERTENSION, Historical Med, R-5      fluticasone propionate (FLONASE) 50 mcg/actuation nasal spray 1 [place] by Both Nostrils route two (2) times daily as needed for Rhinitis., Historical Med      carvedilol (COREG CR) 80 mg CR capsule Take 80 mg by mouth daily (with breakfast). , Historical Med, R-5      potassium chloride (K-DUR, KLOR-CON) 20 mEq tablet Take 20 mEq by mouth daily. , Historical Med, R-5           2. Follow-up Information     Follow up With Specialties Details Why Contact Info    Sosa Bynum MD Internal Medicine   Critical access hospital  [phone]      [facility] EMERGENCY DEPT Emergency Medicine  As needed, If symptoms worsen [address]  [address] [facility]  [phone]          Return to ED if worse  Diagnosis     Clinical Impression:   1. Acute vomiting    2. Fall from ground level    3. History of stroke    4. Postural dizziness        Attestation:  I personally performed the services described in this documentation on this date 9/15/2019 for patient, Penny Torres. Annamaria Hanna MD    Please note that this dictation was completed with [employer], the computer voice recognition software. Quite often unanticipated grammatical, syntax, homophones, and other interpretive errors are inadvertently transcribed by the computer software. Please disregard these errors. Please excuse any errors that have escaped final proofreading. This note will not be viewable in [address]
